# Patient Record
Sex: MALE | Race: ASIAN | NOT HISPANIC OR LATINO | Employment: OTHER | URBAN - METROPOLITAN AREA
[De-identification: names, ages, dates, MRNs, and addresses within clinical notes are randomized per-mention and may not be internally consistent; named-entity substitution may affect disease eponyms.]

---

## 2021-04-28 ENCOUNTER — COMMUNICATION - HEALTHEAST (OUTPATIENT)
Dept: ADMINISTRATIVE | Facility: CLINIC | Age: 76
End: 2021-04-28

## 2021-04-29 ENCOUNTER — COMMUNICATION - HEALTHEAST (OUTPATIENT)
Dept: CARDIOLOGY | Facility: CLINIC | Age: 76
End: 2021-04-29

## 2021-05-31 ENCOUNTER — RECORDS - HEALTHEAST (OUTPATIENT)
Dept: ADMINISTRATIVE | Facility: CLINIC | Age: 76
End: 2021-05-31

## 2021-06-09 ENCOUNTER — RECORDS - HEALTHEAST (OUTPATIENT)
Dept: ADMINISTRATIVE | Facility: CLINIC | Age: 76
End: 2021-06-09

## 2021-06-16 PROBLEM — I25.119 CORONARY ARTERY DISEASE INVOLVING NATIVE CORONARY ARTERY OF NATIVE HEART WITH ANGINA PECTORIS (H): Status: ACTIVE | Noted: 2020-01-09

## 2021-06-16 PROBLEM — E78.2 MIXED HYPERLIPIDEMIA: Status: ACTIVE | Noted: 2020-01-09

## 2021-06-16 PROBLEM — E11.9 TYPE 2 DIABETES MELLITUS WITHOUT COMPLICATION, WITHOUT LONG-TERM CURRENT USE OF INSULIN (H): Status: ACTIVE | Noted: 2020-01-09

## 2021-06-21 NOTE — LETTER
Letter by Baldo Stahl MD at      Author: Baldo Stahl MD Service: -- Author Type: --    Filed:  Encounter Date: 4/29/2021 Status: (Other)         Kamille Gleason  [address] [facility] [address]      April 29, 2021      Dear Kamille,    This letter is to remind you that you are due for your follow up appointment with Dr. Baldo Stahl . To help ensure you are in the best health possible, a regular follow-up with your cardiologist is essential.     Please call our Patient Scheduling Line at [phone] to schedule your appointment at your earliest convenience.  If you have recently scheduled an appointment, please disregard this letter.    We look forward to seeing you again. As always, we are available at the number  above for any questions or concerns you may have.      Sincerely,     The Physicians and Staff of [facility]

## 2021-06-21 NOTE — LETTER
Letter by Baldo Stahl MD at      Author: Baldo Stahl MD Service: -- Author Type: --    Filed:  Encounter Date: 4/28/2021 Status: (Other)         Kamille Gleason  [address] [facility] [address]      April 28, 2021      Dear Kamille,    This letter is to remind you that you are due for your follow up appointment with Dr. Baldo Stahl  . To help ensure you are in the best health possible, a regular follow-up with your cardiologist is essential.     Please call our Patient Scheduling Line at [phone] to schedule your appointment at your earliest convenience.  If you have recently scheduled an appointment, please disregard this letter.    We look forward to seeing you again. As always, we are available at the number  above for any questions or concerns you may have.      Sincerely,     The Physicians and Staff of [facility]

## 2021-07-12 ENCOUNTER — HOSPITAL ENCOUNTER (EMERGENCY)
Dept: RADIOLOGY | Facility: HOSPITAL | Age: 76
End: 2021-07-12
Attending: STUDENT IN AN ORGANIZED HEALTH CARE EDUCATION/TRAINING PROGRAM
Payer: COMMERCIAL

## 2021-07-12 ENCOUNTER — HOSPITAL ENCOUNTER (EMERGENCY)
Facility: HOSPITAL | Age: 76
Discharge: HOME | End: 2021-07-12
Attending: STUDENT IN AN ORGANIZED HEALTH CARE EDUCATION/TRAINING PROGRAM | Admitting: STUDENT IN AN ORGANIZED HEALTH CARE EDUCATION/TRAINING PROGRAM
Payer: COMMERCIAL

## 2021-07-12 ENCOUNTER — HOSPITAL ENCOUNTER (EMERGENCY)
Dept: MRI IMAGING | Facility: HOSPITAL | Age: 76
End: 2021-07-12
Attending: STUDENT IN AN ORGANIZED HEALTH CARE EDUCATION/TRAINING PROGRAM
Payer: COMMERCIAL

## 2021-07-12 VITALS
SYSTOLIC BLOOD PRESSURE: 138 MMHG | WEIGHT: 178 LBS | OXYGEN SATURATION: 99 % | HEART RATE: 59 BPM | HEIGHT: 67 IN | RESPIRATION RATE: 26 BRPM | DIASTOLIC BLOOD PRESSURE: 70 MMHG | BODY MASS INDEX: 27.94 KG/M2 | TEMPERATURE: 98 F

## 2021-07-12 DIAGNOSIS — R20.2 PARESTHESIAS: ICD-10-CM

## 2021-07-12 DIAGNOSIS — R61 DIAPHORESIS: ICD-10-CM

## 2021-07-12 LAB
ALBUMIN SERPL-MCNC: 3.8 G/DL (ref 3.5–5)
ALP SERPL-CCNC: 101 U/L (ref 45–120)
ALT SERPL W P-5'-P-CCNC: 22 U/L (ref 0–45)
ANION GAP SERPL CALCULATED.3IONS-SCNC: 7 MMOL/L (ref 5–18)
AST SERPL W P-5'-P-CCNC: 21 U/L (ref 0–40)
BASOPHILS # BLD AUTO: 0 10E3/UL (ref 0–0.2)
BASOPHILS NFR BLD AUTO: 0 %
BILIRUB SERPL-MCNC: 0.4 MG/DL (ref 0–1)
BUN SERPL-MCNC: 20 MG/DL (ref 8–28)
CALCIUM SERPL-MCNC: 8.9 MG/DL (ref 8.5–10.5)
CHLORIDE BLD-SCNC: 107 MMOL/L (ref 98–107)
CO2 SERPL-SCNC: 25 MMOL/L (ref 22–31)
CREAT SERPL-MCNC: 0.86 MG/DL (ref 0.7–1.3)
EOSINOPHIL # BLD AUTO: 0.2 10E3/UL (ref 0–0.7)
EOSINOPHIL NFR BLD AUTO: 3 %
ERYTHROCYTE [DISTWIDTH] IN BLOOD BY AUTOMATED COUNT: 12.7 % (ref 10–15)
GFR SERPL CREATININE-BSD FRML MDRD: 85 ML/MIN/1.73M2
GLUCOSE BLD-MCNC: 113 MG/DL (ref 70–125)
HCT VFR BLD AUTO: 39.6 % (ref 40–53)
HGB BLD-MCNC: 12.7 G/DL (ref 13.3–17.7)
IMM GRANULOCYTES # BLD: 0 10E3/UL
IMM GRANULOCYTES NFR BLD: 0 %
INR PPP: 1.03 (ref 0.85–1.15)
LYMPHOCYTES # BLD AUTO: 1.7 10E3/UL (ref 0.8–5.3)
LYMPHOCYTES NFR BLD AUTO: 26 %
MCH RBC QN AUTO: 29.8 PG (ref 26.5–33)
MCHC RBC AUTO-ENTMCNC: 32.1 G/DL (ref 31.5–36.5)
MCV RBC AUTO: 93 FL (ref 78–100)
MONOCYTES # BLD AUTO: 0.5 10E3/UL (ref 0–1.3)
MONOCYTES NFR BLD AUTO: 7 %
NEUTROPHILS # BLD AUTO: 4.3 10E3/UL (ref 1.6–8.3)
NEUTROPHILS NFR BLD AUTO: 64 %
NRBC # BLD AUTO: 0 10E3/UL
NRBC BLD AUTO-RTO: 0 /100
PLATELET # BLD AUTO: 179 10E3/UL (ref 150–450)
POTASSIUM BLD-SCNC: 4.7 MMOL/L (ref 3.5–5)
PROT SERPL-MCNC: 6.7 G/DL (ref 6–8)
RBC # BLD AUTO: 4.26 10E6/UL (ref 4.4–5.9)
SODIUM SERPL-SCNC: 139 MMOL/L (ref 136–145)
TROPONIN I SERPL-MCNC: <0.01 NG/ML (ref 0–0.29)
TROPONIN I SERPL-MCNC: <0.01 NG/ML (ref 0–0.29)
WBC # BLD AUTO: 6.7 10E3/UL (ref 4–11)

## 2021-07-12 PROCEDURE — 99285 EMERGENCY DEPT VISIT HI MDM: CPT | Mod: 25

## 2021-07-12 PROCEDURE — 71046 X-RAY EXAM CHEST 2 VIEWS: CPT

## 2021-07-12 PROCEDURE — 36592 COLLECT BLOOD FROM PICC: CPT | Performed by: FAMILY MEDICINE

## 2021-07-12 PROCEDURE — 36592 COLLECT BLOOD FROM PICC: CPT | Performed by: STUDENT IN AN ORGANIZED HEALTH CARE EDUCATION/TRAINING PROGRAM

## 2021-07-12 PROCEDURE — 36415 COLL VENOUS BLD VENIPUNCTURE: CPT | Performed by: STUDENT IN AN ORGANIZED HEALTH CARE EDUCATION/TRAINING PROGRAM

## 2021-07-12 PROCEDURE — 85610 PROTHROMBIN TIME: CPT | Performed by: STUDENT IN AN ORGANIZED HEALTH CARE EDUCATION/TRAINING PROGRAM

## 2021-07-12 PROCEDURE — 70549 MR ANGIOGRAPH NECK W/O&W/DYE: CPT

## 2021-07-12 PROCEDURE — 85025 COMPLETE CBC W/AUTO DIFF WBC: CPT | Performed by: STUDENT IN AN ORGANIZED HEALTH CARE EDUCATION/TRAINING PROGRAM

## 2021-07-12 PROCEDURE — 93005 ELECTROCARDIOGRAM TRACING: CPT

## 2021-07-12 PROCEDURE — 84484 ASSAY OF TROPONIN QUANT: CPT | Performed by: FAMILY MEDICINE

## 2021-07-12 PROCEDURE — 70553 MRI BRAIN STEM W/O & W/DYE: CPT

## 2021-07-12 PROCEDURE — 36415 COLL VENOUS BLD VENIPUNCTURE: CPT | Mod: 91 | Performed by: FAMILY MEDICINE

## 2021-07-12 PROCEDURE — 93005 ELECTROCARDIOGRAM TRACING: CPT | Performed by: FAMILY MEDICINE

## 2021-07-12 PROCEDURE — A9585 GADOBUTROL INJECTION: HCPCS | Performed by: STUDENT IN AN ORGANIZED HEALTH CARE EDUCATION/TRAINING PROGRAM

## 2021-07-12 PROCEDURE — 255N000002 HC RX 255 OP 636: Performed by: STUDENT IN AN ORGANIZED HEALTH CARE EDUCATION/TRAINING PROGRAM

## 2021-07-12 PROCEDURE — 82040 ASSAY OF SERUM ALBUMIN: CPT | Performed by: STUDENT IN AN ORGANIZED HEALTH CARE EDUCATION/TRAINING PROGRAM

## 2021-07-12 PROCEDURE — 84484 ASSAY OF TROPONIN QUANT: CPT | Performed by: STUDENT IN AN ORGANIZED HEALTH CARE EDUCATION/TRAINING PROGRAM

## 2021-07-12 PROCEDURE — 93005 ELECTROCARDIOGRAM TRACING: CPT | Performed by: STUDENT IN AN ORGANIZED HEALTH CARE EDUCATION/TRAINING PROGRAM

## 2021-07-12 RX ORDER — GADOBUTROL 604.72 MG/ML
8 INJECTION INTRAVENOUS ONCE
Status: COMPLETED | OUTPATIENT
Start: 2021-07-12 | End: 2021-07-12

## 2021-07-12 RX ORDER — ASPIRIN 325 MG
325 TABLET ORAL DAILY
Refills: 0
Start: 2021-07-12 | End: 2024-09-04

## 2021-07-12 RX ADMIN — GADOBUTROL 8 ML: 604.72 INJECTION INTRAVENOUS at 17:09

## 2021-07-12 ASSESSMENT — ENCOUNTER SYMPTOMS
SHORTNESS OF BREATH: 0
NUMBNESS: 1
NECK PAIN: 1
SPEECH DIFFICULTY: 0
WEAKNESS: 0
NECK STIFFNESS: 1
DIAPHORESIS: 1

## 2021-07-12 ASSESSMENT — MIFFLIN-ST. JEOR: SCORE: 1506.03

## 2021-07-12 NOTE — ED NOTES
Bed: JNED-01  Expected date: 7/12/21  Expected time: 3:14 PM  Means of arrival: Ambulance  Comments:  Kelsy- left side tingling

## 2021-07-12 NOTE — ED TRIAGE NOTES
Arrives via ems after episode of lt sided tingling and diaphoresis. Was concerned this may be his heart as he had same symptoms prior to heart surgery. Took 650mg of ASA and 1 NTG SL. Most of tingling resolved PTA. Still c/o tingling lt foot. Denies any CP. Wife noticed he had an episode of abnormal speech yesterday that resolved on it's own. Stroke scale negative on arrival.

## 2021-07-12 NOTE — ED PROVIDER NOTES
EMERGENCY DEPARTMENT ENCOUNTER      NAME: Kamille Gleason  AGE: 75 year old male  YOB: 1945  MRN: [id number]  EVALUATION DATE & TIME: 7/12/2021  3:27 PM    PCP: No primary care provider on file.    ED PROVIDER: Azael Edwards M.D.      Chief Complaint   Patient presents with     Numbness     Lt foot         FINAL IMPRESSION:  No diagnosis found.      ED COURSE & MEDICAL DECISION MAKING:    Pertinent Labs & Imaging studies reviewed. (See chart for details)  75 year old male presents to the Emergency Department for evaluation of very vague symptomatology including numbness which she locates in his left ankle shoulder elbow and a spot on his face.  History was a bit difficult and symptoms were vague.  The patient himself is concerned that these could be similar to his previous MI symptoms although that was almost 15 years ago.  Patient did not have any significant headache and no other neuro symptoms associated.  Because of his heart history I felt that at the very least a troponin and probably delta troponin were warranted.  MRI has been pursued to rule out any sort of stroke or intracranial process.  Patient did have some neck pain which based on history and physical sound to be musculoskeletal however certainly dissection or spinal cord or disc issue is certainly in the differential this should be ruled out by MRI.    Patient did have one episode here in the emergency department where his entire lower face became numb.  EKG remains reassuring and I think this is less likely anything intracranial or cardiac in nature however we will continue with work-up.    Patient was signed out to Dr. Velasquez pending MRI and second troponin.    3:29 PM I met with the patient, obtained history, performed an initial exam, and discussed options and plan for diagnostics and treatment here in the ED. Proper PPE was worn during the entire patient encounter.   3:51 PM I rechecked and updated the patient.  He did say  that he had some numbness around his entire face which seem to have just started.  Neuro exam remained completely normal.    National Institutes of Health Stroke Scale  Exam Interval: Baseline   Score    Level of consciousness: (0)   Alert, keenly responsive    LOC questions: (0)   Answers both questions correctly    LOC commands: (0)   Performs both tasks correctly    Best gaze: (0)   Normal    Visual: (0)   No visual loss    Facial palsy: (0)   Normal symmetrical movements    Motor arm (left): (0)   No drift    Motor arm (right): (0)   No drift    Motor leg (left): (0)   No drift    Motor leg (right): (0)   No drift    Limb ataxia: (0)   Absent    Sensory: (0)   Normal- no sensory loss    Best language: (0)   Normal- no aphasia    Dysarthria: (0)   Normal    Extinction and inattention: (0)   No abnormality        Total Score:  0           At the conclusion of the encounter I discussed the results of all of the tests and the disposition. The questions were answered. The patient or family acknowledged understanding and was agreeable with the care plan.        minutes of critical care time     MEDICATIONS GIVEN IN THE EMERGENCY:  Medications - No data to display    NEW PRESCRIPTIONS STARTED AT TODAY'S ER VISIT\  New Prescriptions    No medications on file           =================================================================    HPI    Patient information was obtained from: Patient     Use of : N/A         Kamille Gleason is a 75 year old male with a pertinent history of s/p CABG x3, diabetes mellitus II, hyperlipidemia,  who presents to this ED via EMS for evaluation of numbness.    Today at 1330 (~2 hours PTA), patient began to endorse numbness and tingling to his left ankle, knee, shoulder, and face. He also became diaphoretic so he took 650 mg of aspirin, and 1 nitroglycerin which seemed to resolve most of his symptoms. Patient's symptoms have mostly resolved but he still endorses paresthesia to  his left leg radiating up to his knee. He also reports having several days of neck pain and stiffness that seemed to worsen just prior to this episode of numbness today. His wife also noticed his speech seemed somewhat slurred yesterday, which didn't last long and she feels like he hasn't been acting like himself for the past few days. He also endorsed some chest pain and pressure earlier this week but denies having any chest pain today. Patient states his symptoms felt somewhat similar to his previous heart surgery.     Denies weakness, speech difficulty, chest pain, shortness of breath, or any additional symptoms at this time.       REVIEW OF SYSTEMS   Review of Systems   Constitutional: Positive for diaphoresis.   Respiratory: Negative for shortness of breath.    Cardiovascular: Negative for chest pain.   Musculoskeletal: Positive for neck pain and neck stiffness.   Neurological: Positive for numbness (left side). Negative for speech difficulty and weakness.   All other systems reviewed and are negative.       PAST MEDICAL HISTORY:  Past Medical History:   Diagnosis Date     CAD (coronary artery disease)      Hyperlipidemia        PAST SURGICAL HISTORY:  Past Surgical History:   Procedure Laterality Date     BACK SURGERY       BYPASS GRAFT ARTERY CORONARY  2007    3-vessel     KNEE SURGERY Left            CURRENT MEDICATIONS:    No current facility-administered medications for this encounter.     Current Outpatient Medications   Medication     aspirin 325 MG tablet     cholecalciferol, vitamin D3, 2,000 unit Tab     nitroglycerin (NITROSTAT) 0.4 MG SL tablet     omeprazole (PRILOSEC) 20 MG capsule     psyllium (METAMUCIL) 3.4 gram packet     simvastatin (ZOCOR) 20 MG tablet     traMADol (ULTRAM) 50 mg tablet       ALLERGIES:  Allergies   Allergen Reactions     Atorvastatin Headache       FAMILY HISTORY:  Family History   Problem Relation Age of Onset     Coronary Artery Disease Brother        SOCIAL HISTORY:  "  Social History     Socioeconomic History     Marital status:      Spouse name: Not on file     Number of children: Not on file     Years of education: Not on file     Highest education level: Not on file   Occupational History     Not on file   Tobacco Use     Smoking status: Former Smoker     Tobacco comment: pt quit 5.5 years ago   Substance and Sexual Activity     Alcohol use: Yes     Comment: Alcoholic Drinks/day: 3 times per week     Drug use: No     Sexual activity: Not on file   Other Topics Concern     Not on file   Social History Narrative     Not on file     Social Determinants of Health     Financial Resource Strain:      Difficulty of Paying Living Expenses:    Food Insecurity:      Worried About Running Out of Food in the Last Year:      Ran Out of Food in the Last Year:    Transportation Needs:      Lack of Transportation (Medical):      Lack of Transportation (Non-Medical):    Physical Activity:      Days of Exercise per Week:      Minutes of Exercise per Session:    Stress:      Feeling of Stress :    Social Connections:      Frequency of Communication with Friends and Family:      Frequency of Social Gatherings with Friends and Family:      Attends Christian Services:      Active Member of Clubs or Organizations:      Attends Club or Organization Meetings:      Marital Status:    Intimate Partner Violence:      Fear of Current or Ex-Partner:      Emotionally Abused:      Physically Abused:      Sexually Abused:        VITALS:  BP 95/57   Pulse 62   Temp 98  F (36.7  C) (Oral)   Resp 15   Ht 1.702 m (5' 7\")   Wt 80.7 kg (178 lb)   SpO2 96%   BMI 27.88 kg/m      PHYSICAL EXAM    Constitutional: Well developed, Well nourished, NAD, GCS 15  HENT: Normocephalic, Atraumatic, Bilateral external ears normal, Oropharynx normal, mucous membranes moist, Nose normal. Neck-  Normal range of motion, No tenderness, Supple, No stridor.  Eyes: PERRL, EOMI, Conjunctiva normal, No discharge. "   Respiratory: Normal breath sounds, No respiratory distress, No wheezing, Speaks full sentences easily. No cough.  Cardiovascular: Normal heart rate, Regular rhythm, No murmurs, No rubs, No gallops. Chest wall nontender.  GI:Soft, No tenderness, No masses, No flank tenderness. No rebound or guarding.    Musculoskeletal: 2+ DP pulses. No edema.No cyanosis, No clubbing. Good range of motion in all major joints. No tenderness to palpation or major deformities noted.   Integument: Warm, Dry, No erythema, No rash. No petechiae.   Neurologic: Alert & oriented x 3,  CN 3-12 intact Normal motor function, Normal sensory function, No focal deficits noted. Normal gait. Normal finger to nose bilaterally. No subjective sensation loss to his extremities or face.   Psychiatric: Affect normal, Judgment normal, Mood normal. Cooperative.        LAB:  All pertinent labs reviewed and interpreted.  Labs Ordered and Resulted from Time of ED Arrival Up to the Time of Departure from the ED   CBC WITH PLATELETS AND DIFFERENTIAL - Abnormal; Notable for the following components:       Result Value    RBC Count 4.26 (*)     Hemoglobin 12.7 (*)     Hematocrit 39.6 (*)     All other components within normal limits   COMPREHENSIVE METABOLIC PANEL - Normal   TROPONIN I - Normal   INR - Normal   CBC WITH PLATELETS & DIFFERENTIAL    Narrative:     The following orders were created for panel order CBC with Platelets & Differential.  Procedure                               Abnormality         Status                     ---------                               -----------         ------                     CBC with platelets and d...[[id number]]  Abnormal            Final result                 Please view results for these tests on the individual orders.         RADIOLOGY:  Reviewed all pertinent imaging. Please see official radiology report.  MR Brain COW Carotid wwo Contrast    (Results Pending)   Chest XR,  PA & LAT    (Results Pending)         EKG:     Performed at: 1533    Impression: Sinus rhythm. Nonspecific T wave abnormality anteriorly.     Rate: 65 bpm  Rhythm: Normal sinus rhythm  Axis: 51 ms  ND Interval: 198 ms  QRS Interval: 94 ms  QTc Interval: 424/440 ms  ST Changes: Nonspecific T wave abnormality anteriorly.   Comparison: When compared with EKG os 02-NOV-2020, no significant change was found    I have independently reviewed and interpreted the EKG(s) documented above.    EKG:    Performed at: 1554    Impression: Sinus rhythm. Nonspecific T wave abnormality anteriorly.     Rate: 63 bpm  Rhythm: Normal sinus rhythm   Axis: 53  ND Interval: 186 ms  QRS Interval: 94 ms  QTc Interval: 426/435 ms  ST Changes: Nonspecific T wave abnormality anteriorly.   Comparison: When compared with prior EKG, no significant change was found.     I have independently reviewed and interpreted the EKG(s) documented    PROCEDURES:   None      I, Miladys Rojas, am serving as a scribe to document services personally performed by Dr. Azael Edwards based on my observation and the provider's statements to me. I, Azael Edwards MD attest that Miladys Rojas is acting in a scribe capacity, has observed my performance of the services and has documented them in accordance with my direction.    Azael Edwards M.D.  Emergency Medicine  [facility] EMERGENCY DEPARTMENT  [address] [id number] [phone]  Dept: [phone]     Azael Edwards MD  07/12/21 9171

## 2021-07-12 NOTE — ED PROVIDER NOTES
"ED SIGNOUT  Date/Time:7/12/2021 5:33 PM    Patient signed out to me by my colleague, Azael Edwards MD.  Please see their note for complete history and physical. Plan to follow up on pending MRI and second troponin.     The creation of this record is based on the scribe s observations of the work being performed by Otto Velasquez MD, and the provider s statements to them. It was created on their behalf by Giovany Galdamez a trained medical scribe. This document has been checked and approved by the attending provider.      ED Course/MDM:  5:30 PM Signout accepted from Azael Edwards MD.  Prior records were reviewed.  Diagnostics from this visit are reviewed.  Patient presents with migratory paresthesias, including left arm paresthesia earlier accompanied by diaphoresis.  Concerned because he feels like this is similar to symptoms prior to his bypass.  EKG reassuring, initial troponin negative.  MRI is pending along with repeat troponin at 7 PM and plan to discharge if negative with outpatient follow-up.    ED Course as of Jul 12 1838   Mon Jul 12, 2021   1749 5:50 PM MRI negative for acute intracranial pathology.  Repeat troponin at 7:00 PM.  If negative, patient is stable for outpatient follow-up with PCP and cardiology.        Patient with migratory paresthesias, history of CABG.  No neurologic findings on exam, MRI/MRI reassuring, EKG reassuring and troponin negative x2.  Patient should increase aspirin to 325 mg daily and follow-up with neurology    Vitals:  /65   Pulse 61   Temp 98  F (36.7  C) (Oral)   Resp 22   Ht 1.702 m (5' 7\")   Wt 80.7 kg (178 lb)   SpO2 98%   BMI 27.88 kg/m        EKG   EKG personally reviewed and interpreted by me performed at 7:30 PM demonstrates normal sinus rhythm with first-degree AV block rate 62, no acute ST elevations or depression, normal intervals, normal axis.  Compared to prior earlier today, previously seen PACs are absent,.    Pertinent labs results reviewed "   Results for orders placed or performed during the hospital encounter of 07/12/21   MR Brain COW Carotid wwo Contrast    Impression    IMPRESSION:  HEAD MRI:   1.  No acute intracranial abnormality.    2.  Mild volume loss.    3.  Paranasal sinus mucosal disease.    HEAD MRA:   1.  Limited by motion. No definite high grade stenosis. No occlusion within the proximal branches of the [place].    2.  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    NECK MRA:  1.  No significant carotid stenosis.    2.  Origins of the vertebral arteries are tortuous and poorly evaluated due to motion. There is likely mild narrowing of the left vertebral artery origin, though the vessels are otherwise widely patent throughout their cervical courses.    To consult with one of our [facility] Neurointerventional experts or schedule a formal appointment, please contact our clinic, call center, or on-call physician.    [facility] Neurointerventional Clinic: [phone]  [facility] Call Center: [phone]  On-call MD: [phone] (pager)   Chest XR,  PA & LAT    Impression    IMPRESSION: No pleural fluid or pneumothorax. No airspace disease or edema. Normal size of the heart. Sternotomy suture wires are present.    Comprehensive metabolic panel   Result Value Ref Range    Sodium 139 136 - 145 mmol/L    Potassium 4.7 3.5 - 5.0 mmol/L    Chloride 107 98 - 107 mmol/L    Carbon Dioxide (CO2) 25 22 - 31 mmol/L    Anion Gap 7 5 - 18 mmol/L    Urea Nitrogen 20 8 - 28 mg/dL    Creatinine 0.86 0.70 - 1.30 mg/dL    Calcium 8.9 8.5 - 10.5 mg/dL    Glucose 113 70 - 125 mg/dL    Alkaline Phosphatase 101 45 - 120 U/L    AST 21 0 - 40 U/L    ALT 22 0 - 45 U/L    Protein Total 6.7 6.0 - 8.0 g/dL    Albumin 3.8 3.5 - 5.0 g/dL    Bilirubin Total 0.4 0.0 - 1.0 mg/dL    GFR Estimate 85 >60 mL/min/1.73m2   Troponin I (now)   Result Value Ref Range    Troponin I <0.01 0.00 - 0.29 ng/mL   INR   Result Value Ref Range    INR 1.03 0.85 - 1.15    CBC with platelets and differential   Result Value Ref Range    WBC Count 6.7 4.0 - 11.0 10e3/uL    RBC Count 4.26 (L) 4.40 - 5.90 10e6/uL    Hemoglobin 12.7 (L) 13.3 - 17.7 g/dL    Hematocrit 39.6 (L) 40.0 - 53.0 %    MCV 93 78 - 100 fL    MCH 29.8 26.5 - 33.0 pg    MCHC 32.1 31.5 - 36.5 g/dL    RDW 12.7 10.0 - 15.0 %    Platelet Count 179 150 - 450 10e3/uL    % Neutrophils 64 %    % Lymphocytes 26 %    % Monocytes 7 %    % Eosinophils 3 %    % Basophils 0 %    % Immature Granulocytes 0 %    NRBCs per 100 WBC 0 <1 /100    Absolute Neutrophils 4.3 1.6 - 8.3 10e3/uL    Absolute Lymphocytes 1.7 0.8 - 5.3 10e3/uL    Absolute Monocytes 0.5 0.0 - 1.3 10e3/uL    Absolute Eosinophils 0.2 0.0 - 0.7 10e3/uL    Absolute Basophils 0.0 0.0 - 0.2 10e3/uL    Absolute Immature Granulocytes 0.0 <=0.0 10e3/uL    Absolute NRBCs 0.0 10e3/uL   Troponin I (second draw)   Result Value Ref Range    Troponin I <0.01 0.00 - 0.29 ng/mL       Pertinent imaging reviewed   Please see official radiology report.  MR Brain COW Carotid wwo Contrast   Final Result   IMPRESSION:   HEAD MRI:    1.  No acute intracranial abnormality.      2.  Mild volume loss.      3.  Paranasal sinus mucosal disease.      HEAD MRA:    1.  Limited by motion. No definite high grade stenosis. No occlusion within the proximal branches of the [place].      2.  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      NECK MRA:   1.  No significant carotid stenosis.      2.  Origins of the vertebral arteries are tortuous and poorly evaluated due to motion. There is likely mild narrowing of the left vertebral artery origin, though the vessels are otherwise widely patent throughout their cervical courses.      To consult with one of our [facility] Neurointerventional experts or schedule a formal appointment, please contact our clinic, call center, or on-call physician.      [facility] Neurointerventional Clinic: [phone]   [facility] Call Center: [phone]   On-call MD: [phone] (pager)      Chest XR,  PA & LAT   Final Result   IMPRESSION: No pleural fluid or pneumothorax. No airspace disease or edema. Normal size of the heart. Sternotomy suture wires are present.          Interventions  Medications - No data to display     1. Paresthesias    2. Diaphoresis      Otto Velasquez MD,  [facility] Emergency Department     Otto Velasquez MD  07/12/21 2042       Otto Velasquez MD  07/12/21 2043

## 2021-07-13 LAB
ATRIAL RATE - MUSE: 62 BPM
ATRIAL RATE - MUSE: 65 BPM
DIASTOLIC BLOOD PRESSURE - MUSE: 61 MMHG
DIASTOLIC BLOOD PRESSURE - MUSE: 72 MMHG
INTERPRETATION ECG - MUSE: NORMAL
INTERPRETATION ECG - MUSE: NORMAL
P AXIS - MUSE: 15 DEGREES
P AXIS - MUSE: 9 DEGREES
PR INTERVAL - MUSE: 198 MS
PR INTERVAL - MUSE: 212 MS
QRS DURATION - MUSE: 94 MS
QRS DURATION - MUSE: 98 MS
QT - MUSE: 424 MS
QT - MUSE: 440 MS
QTC - MUSE: 440 MS
QTC - MUSE: 446 MS
R AXIS - MUSE: 51 DEGREES
R AXIS - MUSE: 55 DEGREES
SYSTOLIC BLOOD PRESSURE - MUSE: 115 MMHG
SYSTOLIC BLOOD PRESSURE - MUSE: 121 MMHG
T AXIS - MUSE: 80 DEGREES
T AXIS - MUSE: 86 DEGREES
VENTRICULAR RATE- MUSE: 62 BPM
VENTRICULAR RATE- MUSE: 65 BPM

## 2021-07-13 NOTE — ED NOTES
Rounded on patient. Reviewed CP. Repeat troponin and EKG done. Denies CP or SOB. Reports some tingling in his bottom lip that has improved since he got here.

## 2021-07-20 ENCOUNTER — OFFICE VISIT (OUTPATIENT)
Dept: NEUROLOGY | Facility: CLINIC | Age: 76
End: 2021-07-20
Payer: COMMERCIAL

## 2021-07-20 ENCOUNTER — LAB (OUTPATIENT)
Dept: LAB | Facility: HOSPITAL | Age: 76
End: 2021-07-20
Payer: COMMERCIAL

## 2021-07-20 VITALS
WEIGHT: 177 LBS | HEIGHT: 68 IN | BODY MASS INDEX: 26.83 KG/M2 | SYSTOLIC BLOOD PRESSURE: 124 MMHG | HEART RATE: 65 BPM | DIASTOLIC BLOOD PRESSURE: 91 MMHG

## 2021-07-20 DIAGNOSIS — R20.2 PARESTHESIAS: ICD-10-CM

## 2021-07-20 DIAGNOSIS — M54.2 NECK PAIN: Primary | ICD-10-CM

## 2021-07-20 LAB
TSH SERPL DL<=0.005 MIU/L-ACNC: 1.35 UIU/ML (ref 0.3–5)
VIT B12 SERPL-MCNC: 454 PG/ML (ref 213–816)

## 2021-07-20 PROCEDURE — 82607 VITAMIN B-12: CPT

## 2021-07-20 PROCEDURE — 36415 COLL VENOUS BLD VENIPUNCTURE: CPT

## 2021-07-20 PROCEDURE — 84443 ASSAY THYROID STIM HORMONE: CPT

## 2021-07-20 PROCEDURE — 99204 OFFICE O/P NEW MOD 45 MIN: CPT | Performed by: PSYCHIATRY & NEUROLOGY

## 2021-07-20 ASSESSMENT — MIFFLIN-ST. JEOR: SCORE: 1509.43

## 2021-07-20 NOTE — NURSING NOTE
Chief Complaint   Patient presents with     Numbness     Episode of face numbness to left foot.     Donna Perales RN on 7/20/2021 at 8:50 AM

## 2021-07-20 NOTE — LETTER
7/20/2021         RE: Kamille Gleason  [address] [facility] [address]        Dear Colleague,    Thank you for referring your patient, Kamille Gleason, to the [facility] [place]. Please see a copy of my visit note below.    [facility]  [place]    Kamille Gleason MRN# [id number]   Age: 75 year old YOB: 1945               Assessment and Plan:   Assessment:   Paresthesias, neck pain    He had brief paresthesias in the face, some pain and tingling to the left ankle, etiology not clear    We will check MRI of the C-spine, if this shows facet arthropathy this could be the cause of his neck pain.  If so we could send him for facet injection, if not physical therapy may help.  The MRI will also tell us if there is any cord compression or a spinal cord lesion that might contribute to his symptoms.        Plan:   Orders Placed This Encounter   Procedures     MR Cervical Spine w/o Contrast     Vitamin B12     TSH                  Chief Complaint/HPI:     This patient is a 75-year-old man seen in neurologic consultation at our [facility].  He was seen in the [facility] emergency room last week, he woke up from a nap and felt numbness on both sides of his face.  He thought this might be a stroke, his wife prevailed on him to call the ambulance.  At [facility] MRI of the brain showed no stroke or other acute issues.  His heart was checked out.  MRA showed no significant stenosis.  Basic labs were fine.  He also mentions a tingling pain at the left ankle, this is not worse with weightbearing.  He says that ice and hot packs make it better, I asked about that and this is in relation to some neck pain that he has also been experiencing for a couple months now.  He has to turn his shoulders in order to look to the side (such as with driving).  Sometimes his feet will feel cold at night.            Past Medical History:    has a past medical history of CAD  "(coronary artery disease) and Hyperlipidemia.          Past Surgical History:    has a past surgical history that includes Bypass graft artery coronary (2007); back surgery; and knee surgery (Left).          Social History:     Social History     Tobacco Use     Smoking status: Former Smoker     Smokeless tobacco: Never Used     Tobacco comment: pt quit 10 years ago   Substance Use Topics     Alcohol use: Yes     Comment: 2 times per week most             Family History:     Family History   Problem Relation Age of Onset     Coronary Artery Disease Brother                 Allergies:     Allergies   Allergen Reactions     Atorvastatin Headache             Medications:     Current Outpatient Medications:      aspirin (ASA) 325 MG tablet, Take 1 tablet (325 mg) by mouth daily, Disp: , Rfl: 0     cholecalciferol, vitamin D3, 2,000 unit Tab, [CHOLECALCIFEROL, VITAMIN D3, 2,000 UNIT TAB] Take 2,000 Units by mouth daily., Disp: , Rfl:      nitroglycerin (NITROSTAT) 0.4 MG SL tablet, [NITROGLYCERIN (NITROSTAT) 0.4 MG SL TABLET] Place 0.4 mg under the tongue every 5 (five) minutes as needed., Disp: , Rfl:      psyllium (METAMUCIL) 3.4 gram packet, [PSYLLIUM (METAMUCIL) 3.4 GRAM PACKET] Take 1 packet by mouth daily as needed. , Disp: , Rfl:      simvastatin (ZOCOR) 20 MG tablet, [SIMVASTATIN (ZOCOR) 20 MG TABLET] Take 20 mg by mouth 3 (three) times a week. MWF at bedtime, Disp: , Rfl:      traMADol (ULTRAM) 50 mg tablet, [TRAMADOL (ULTRAM) 50 MG TABLET] Take 50 mg by mouth every 6 (six) hours as needed., Disp: , Rfl:      omeprazole (PRILOSEC) 20 MG capsule, [OMEPRAZOLE (PRILOSEC) 20 MG CAPSULE] Take 20 mg by mouth daily as needed. (Patient not taking: Reported on 7/20/2021), Disp: , Rfl:               Physical Exam:      Vitals: BP (!) 124/91 (BP Location: Right arm)   Pulse 65   Ht 1.715 m (5' 7.5\")   Wt 80.3 kg (177 lb)   BMI 27.31 kg/m    BMI= Body mass index is 27.31 kg/m .     Patient is alert and oriented x 3 in no " acute distress. Neck was supple, no carotid bruits, thyromegaly, lymphadenopathy or JVD noted.   Neurological Exam:   Mental status: Patient is alert and oriented x 3. Speech is clear and fluent      Cranial nerves:    CN II: Visual fields are full to confrontation. Fundoscopic exam is normal. PERRLA.   CN III, IV, VI: EOMI.    CN V: Facial sensation intact to pinprick in all 3 divisions bilaterally.    CN VII: Face is symmetric with normal eye closure and smile.   CN VII: Hearing is normal to rubbing fingers   CN IX, X: Palate elevates symmetrically. Phonation is normal. Gag present.   CN XI: Head turning and shoulder shrug are intact   CN XII: Tongue is midline with normal movements and no atrophy or fasciculations.   Motor: Muscle bulk and tone are normal. No pronator drift. Strength is 5/5 bilaterally. No fasciculations noted.   Reflexes: Reflexes are symmetric, diminished in the arms, difficult to elicit at the ankles. Plantar responses are flexor.   Sensory: Light touch, pinprick, and vibration sense are intact bilaterally.    Coordination: Rapid alternating movements and fine finger movements are intact. There is no dysmetria on finger-to-nose   Gait: Posture is normal. Gait is steady with normal steps, base, arm swing, and turning. Heel and toe walking are okay          Jamal Cantu MD             Again, thank you for allowing me to participate in the care of your patient.        Sincerely,        Jamal Cantu MD

## 2021-07-20 NOTE — LETTER
July 31, 2021      Kamille Gleason  [address] [facility] [address]        Dear ,    We are writing to inform you of your test results.    The MRI of your neck shows some wear and tear and foraminal narrowing, but no problems that should cause neck stiffness.  I recommend physical therapy for your neck pain.  I put a referral into the computer, you can call [phone] for an appointment.    If you have any questions or concerns, please call the clinic at the number listed above.       Sincerely,    Jamal Cantu MD

## 2021-07-20 NOTE — PROGRESS NOTES
[facility]  [place]    Kamille Gleason MRN# [id number]   Age: 75 year old YOB: 1945               Assessment and Plan:   Assessment:   Paresthesias, neck pain    He had brief paresthesias in the face, some pain and tingling to the left ankle, etiology not clear    We will check MRI of the C-spine, if this shows facet arthropathy this could be the cause of his neck pain.  If so we could send him for facet injection, if not physical therapy may help.  The MRI will also tell us if there is any cord compression or a spinal cord lesion that might contribute to his symptoms.        Plan:   Orders Placed This Encounter   Procedures     MR Cervical Spine w/o Contrast     Vitamin B12     TSH                  Chief Complaint/HPI:     This patient is a 75-year-old man seen in neurologic consultation at our [place] clinic.  He was seen in the [facility] emergency room last week, he woke up from a nap and felt numbness on both sides of his face.  He thought this might be a stroke, his wife prevailed on him to call the ambulance.  At [facility] MRI of the brain showed no stroke or other acute issues.  His heart was checked out.  MRA showed no significant stenosis.  Basic labs were fine.  He also mentions a tingling pain at the left ankle, this is not worse with weightbearing.  He says that ice and hot packs make it better, I asked about that and this is in relation to some neck pain that he has also been experiencing for a couple months now.  He has to turn his shoulders in order to look to the side (such as with driving).  Sometimes his feet will feel cold at night.            Past Medical History:    has a past medical history of CAD (coronary artery disease) and Hyperlipidemia.          Past Surgical History:    has a past surgical history that includes Bypass graft artery coronary (2007); back surgery; and knee surgery (Left).          Social History:     Social History     Tobacco Use      "Smoking status: Former Smoker     Smokeless tobacco: Never Used     Tobacco comment: pt quit 10 years ago   Substance Use Topics     Alcohol use: Yes     Comment: 2 times per week most             Family History:     Family History   Problem Relation Age of Onset     Coronary Artery Disease Brother                 Allergies:     Allergies   Allergen Reactions     Atorvastatin Headache             Medications:     Current Outpatient Medications:      aspirin (ASA) 325 MG tablet, Take 1 tablet (325 mg) by mouth daily, Disp: , Rfl: 0     cholecalciferol, vitamin D3, 2,000 unit Tab, [CHOLECALCIFEROL, VITAMIN D3, 2,000 UNIT TAB] Take 2,000 Units by mouth daily., Disp: , Rfl:      nitroglycerin (NITROSTAT) 0.4 MG SL tablet, [NITROGLYCERIN (NITROSTAT) 0.4 MG SL TABLET] Place 0.4 mg under the tongue every 5 (five) minutes as needed., Disp: , Rfl:      psyllium (METAMUCIL) 3.4 gram packet, [PSYLLIUM (METAMUCIL) 3.4 GRAM PACKET] Take 1 packet by mouth daily as needed. , Disp: , Rfl:      simvastatin (ZOCOR) 20 MG tablet, [SIMVASTATIN (ZOCOR) 20 MG TABLET] Take 20 mg by mouth 3 (three) times a week. MWF at bedtime, Disp: , Rfl:      traMADol (ULTRAM) 50 mg tablet, [TRAMADOL (ULTRAM) 50 MG TABLET] Take 50 mg by mouth every 6 (six) hours as needed., Disp: , Rfl:      omeprazole (PRILOSEC) 20 MG capsule, [OMEPRAZOLE (PRILOSEC) 20 MG CAPSULE] Take 20 mg by mouth daily as needed. (Patient not taking: Reported on 7/20/2021), Disp: , Rfl:               Physical Exam:      Vitals: BP (!) 124/91 (BP Location: Right arm)   Pulse 65   Ht 1.715 m (5' 7.5\")   Wt 80.3 kg (177 lb)   BMI 27.31 kg/m    BMI= Body mass index is 27.31 kg/m .     Patient is alert and oriented x 3 in no acute distress. Neck was supple, no carotid bruits, thyromegaly, lymphadenopathy or JVD noted.   Neurological Exam:   Mental status: Patient is alert and oriented x 3. Speech is clear and fluent      Cranial nerves:    CN II: Visual fields are full to " confrontation. Fundoscopic exam is normal. PERRLA.   CN III, IV, VI: EOMI.    CN V: Facial sensation intact to pinprick in all 3 divisions bilaterally.    CN VII: Face is symmetric with normal eye closure and smile.   CN VII: Hearing is normal to rubbing fingers   CN IX, X: Palate elevates symmetrically. Phonation is normal. Gag present.   CN XI: Head turning and shoulder shrug are intact   CN XII: Tongue is midline with normal movements and no atrophy or fasciculations.   Motor: Muscle bulk and tone are normal. No pronator drift. Strength is 5/5 bilaterally. No fasciculations noted.   Reflexes: Reflexes are symmetric, diminished in the arms, difficult to elicit at the ankles. Plantar responses are flexor.   Sensory: Light touch, pinprick, and vibration sense are intact bilaterally.    Coordination: Rapid alternating movements and fine finger movements are intact. There is no dysmetria on finger-to-nose   Gait: Posture is normal. Gait is steady with normal steps, base, arm swing, and turning. Heel and toe walking are okay          Jamal Cantu MD

## 2021-07-23 ENCOUNTER — HOSPITAL ENCOUNTER (OUTPATIENT)
Dept: MRI IMAGING | Facility: HOSPITAL | Age: 76
Discharge: HOME | End: 2021-07-23
Attending: PSYCHIATRY & NEUROLOGY | Admitting: PSYCHIATRY & NEUROLOGY
Payer: COMMERCIAL

## 2021-07-23 DIAGNOSIS — R20.2 PARESTHESIAS: ICD-10-CM

## 2021-07-23 PROCEDURE — 72141 MRI NECK SPINE W/O DYE: CPT

## 2021-08-14 ENCOUNTER — HEALTH MAINTENANCE LETTER (OUTPATIENT)
Age: 76
End: 2021-08-14

## 2021-10-09 ENCOUNTER — HEALTH MAINTENANCE LETTER (OUTPATIENT)
Age: 76
End: 2021-10-09

## 2021-12-04 ENCOUNTER — HEALTH MAINTENANCE LETTER (OUTPATIENT)
Age: 76
End: 2021-12-04

## 2022-03-20 ENCOUNTER — HEALTH MAINTENANCE LETTER (OUTPATIENT)
Age: 77
End: 2022-03-20

## 2022-07-10 ENCOUNTER — HEALTH MAINTENANCE LETTER (OUTPATIENT)
Age: 77
End: 2022-07-10

## 2022-09-11 ENCOUNTER — HEALTH MAINTENANCE LETTER (OUTPATIENT)
Age: 77
End: 2022-09-11

## 2023-01-23 ENCOUNTER — HEALTH MAINTENANCE LETTER (OUTPATIENT)
Age: 78
End: 2023-01-23

## 2023-04-09 ENCOUNTER — HOSPITAL ENCOUNTER (EMERGENCY)
Facility: HOSPITAL | Age: 78
Discharge: HOME | End: 2023-04-09
Attending: EMERGENCY MEDICINE | Admitting: EMERGENCY MEDICINE
Payer: COMMERCIAL

## 2023-04-09 ENCOUNTER — APPOINTMENT (OUTPATIENT)
Dept: MRI IMAGING | Facility: HOSPITAL | Age: 78
End: 2023-04-09
Attending: EMERGENCY MEDICINE
Payer: COMMERCIAL

## 2023-04-09 ENCOUNTER — APPOINTMENT (OUTPATIENT)
Dept: RADIOLOGY | Facility: HOSPITAL | Age: 78
End: 2023-04-09
Attending: EMERGENCY MEDICINE
Payer: COMMERCIAL

## 2023-04-09 VITALS
BODY MASS INDEX: 28.43 KG/M2 | RESPIRATION RATE: 16 BRPM | DIASTOLIC BLOOD PRESSURE: 79 MMHG | OXYGEN SATURATION: 97 % | HEIGHT: 67 IN | TEMPERATURE: 98.1 F | HEART RATE: 74 BPM | WEIGHT: 181.1 LBS | SYSTOLIC BLOOD PRESSURE: 127 MMHG

## 2023-04-09 DIAGNOSIS — R20.2 PARESTHESIAS: ICD-10-CM

## 2023-04-09 LAB
ALBUMIN UR-MCNC: NEGATIVE MG/DL
ANION GAP SERPL CALCULATED.3IONS-SCNC: 10 MMOL/L (ref 7–15)
APPEARANCE UR: CLEAR
BASOPHILS # BLD AUTO: 0 10E3/UL (ref 0–0.2)
BASOPHILS NFR BLD AUTO: 0 %
BILIRUB UR QL STRIP: NEGATIVE
BUN SERPL-MCNC: 21.8 MG/DL (ref 8–23)
CALCIUM SERPL-MCNC: 9.2 MG/DL (ref 8.8–10.2)
CHLORIDE SERPL-SCNC: 101 MMOL/L (ref 98–107)
COLOR UR AUTO: COLORLESS
CREAT SERPL-MCNC: 0.93 MG/DL (ref 0.67–1.17)
DEPRECATED HCO3 PLAS-SCNC: 26 MMOL/L (ref 22–29)
EOSINOPHIL # BLD AUTO: 0.2 10E3/UL (ref 0–0.7)
EOSINOPHIL NFR BLD AUTO: 4 %
ERYTHROCYTE [DISTWIDTH] IN BLOOD BY AUTOMATED COUNT: 12.6 % (ref 10–15)
GFR SERPL CREATININE-BSD FRML MDRD: 85 ML/MIN/1.73M2
GLUCOSE SERPL-MCNC: 120 MG/DL (ref 70–99)
GLUCOSE UR STRIP-MCNC: NEGATIVE MG/DL
HCT VFR BLD AUTO: 44.4 % (ref 40–53)
HGB BLD-MCNC: 14.4 G/DL (ref 13.3–17.7)
HGB UR QL STRIP: NEGATIVE
HOLD SPECIMEN: NORMAL
HOLD SPECIMEN: NORMAL
IMM GRANULOCYTES # BLD: 0 10E3/UL
IMM GRANULOCYTES NFR BLD: 0 %
KETONES UR STRIP-MCNC: NEGATIVE MG/DL
LEUKOCYTE ESTERASE UR QL STRIP: NEGATIVE
LYMPHOCYTES # BLD AUTO: 1.8 10E3/UL (ref 0.8–5.3)
LYMPHOCYTES NFR BLD AUTO: 30 %
MAGNESIUM SERPL-MCNC: 2 MG/DL (ref 1.7–2.3)
MCH RBC QN AUTO: 29.9 PG (ref 26.5–33)
MCHC RBC AUTO-ENTMCNC: 32.4 G/DL (ref 31.5–36.5)
MCV RBC AUTO: 92 FL (ref 78–100)
MONOCYTES # BLD AUTO: 0.5 10E3/UL (ref 0–1.3)
MONOCYTES NFR BLD AUTO: 8 %
MUCOUS THREADS #/AREA URNS LPF: PRESENT /LPF
NEUTROPHILS # BLD AUTO: 3.5 10E3/UL (ref 1.6–8.3)
NEUTROPHILS NFR BLD AUTO: 58 %
NITRATE UR QL: NEGATIVE
NRBC # BLD AUTO: 0 10E3/UL
NRBC BLD AUTO-RTO: 0 /100
PH UR STRIP: 5.5 [PH] (ref 5–7)
PLATELET # BLD AUTO: 203 10E3/UL (ref 150–450)
POTASSIUM SERPL-SCNC: 4.3 MMOL/L (ref 3.4–5.3)
RBC # BLD AUTO: 4.82 10E6/UL (ref 4.4–5.9)
RBC URINE: 1 /HPF
SODIUM SERPL-SCNC: 137 MMOL/L (ref 136–145)
SP GR UR STRIP: 1.02 (ref 1–1.03)
SQUAMOUS EPITHELIAL: <1 /HPF
UROBILINOGEN UR STRIP-MCNC: <2 MG/DL
WBC # BLD AUTO: 6 10E3/UL (ref 4–11)
WBC URINE: <1 /HPF

## 2023-04-09 PROCEDURE — 36415 COLL VENOUS BLD VENIPUNCTURE: CPT | Performed by: EMERGENCY MEDICINE

## 2023-04-09 PROCEDURE — 70544 MR ANGIOGRAPHY HEAD W/O DYE: CPT

## 2023-04-09 PROCEDURE — 80048 BASIC METABOLIC PNL TOTAL CA: CPT | Performed by: EMERGENCY MEDICINE

## 2023-04-09 PROCEDURE — A9585 GADOBUTROL INJECTION: HCPCS | Performed by: EMERGENCY MEDICINE

## 2023-04-09 PROCEDURE — 71046 X-RAY EXAM CHEST 2 VIEWS: CPT

## 2023-04-09 PROCEDURE — 70549 MR ANGIOGRAPH NECK W/O&W/DYE: CPT

## 2023-04-09 PROCEDURE — 81001 URINALYSIS AUTO W/SCOPE: CPT | Performed by: EMERGENCY MEDICINE

## 2023-04-09 PROCEDURE — 85025 COMPLETE CBC W/AUTO DIFF WBC: CPT | Performed by: EMERGENCY MEDICINE

## 2023-04-09 PROCEDURE — 255N000002 HC RX 255 OP 636: Performed by: EMERGENCY MEDICINE

## 2023-04-09 PROCEDURE — 70553 MRI BRAIN STEM W/O & W/DYE: CPT

## 2023-04-09 PROCEDURE — 70551 MRI BRAIN STEM W/O DYE: CPT

## 2023-04-09 PROCEDURE — 83735 ASSAY OF MAGNESIUM: CPT | Performed by: EMERGENCY MEDICINE

## 2023-04-09 PROCEDURE — 99285 EMERGENCY DEPT VISIT HI MDM: CPT | Mod: 25

## 2023-04-09 PROCEDURE — 93005 ELECTROCARDIOGRAM TRACING: CPT | Performed by: EMERGENCY MEDICINE

## 2023-04-09 RX ORDER — GADOBUTROL 604.72 MG/ML
8 INJECTION INTRAVENOUS ONCE
Status: COMPLETED | OUTPATIENT
Start: 2023-04-09 | End: 2023-04-09

## 2023-04-09 RX ADMIN — GADOBUTROL 8 ML: 604.72 INJECTION INTRAVENOUS at 13:02

## 2023-04-09 ASSESSMENT — ENCOUNTER SYMPTOMS
NUMBNESS: 1
CHILLS: 0
DIAPHORESIS: 0
FATIGUE: 1
FEVER: 0
HEADACHES: 0

## 2023-04-09 ASSESSMENT — ACTIVITIES OF DAILY LIVING (ADL): ADLS_ACUITY_SCORE: 35

## 2023-04-09 NOTE — ED PROVIDER NOTES
EMERGENCY DEPARTMENT ENCOUNTER      NAME: Kamille Gleason  AGE: 77 year old male  YOB: 1945  MRN: [id number]  EVALUATION DATE & TIME: No admission date for patient encounter.    PCP: Ford Rodriguez    ED PROVIDER: Laura Silva MD      Chief Complaint   Patient presents with     Numbness     Tingling         FINAL IMPRESSION:  1. Paresthesias          ED COURSE & MEDICAL DECISION MAKING:    Pertinent Labs & Imaging studies reviewed. (See chart for details)  77 year old male presents to the Emergency Department for evaluation of generalized fatigue, paresthesias in the face and bilateral legs.  Patient was initiated on gabapentin for peripheral neuropathy, he stopped taking this on Thursday thinking this was contributing to his symptoms.  However, given persistence of his symptoms he presents to the emergency department.  MRI of the brain, MRA of the head and neck is unremarkable.  Laboratory studies including hemoglobin, electrolytes, urinalysis within normal limits.  No clear etiology to explain the patient's symptoms.  Patient has no focal neurologic deficits on exam.  Recommend follow-up with PCP given no acute findings in the emergency department.    11:41 AM I met with patient for initial interview and encounter. We also discussed plan for treatment and diagnostic interventions.   2:45 PM I discussed plan for discharge with the patient and the patient's wife.  They are agreeable with this plan.    At the conclusion of the encounter I discussed the results of all of the tests and the disposition. The questions were answered. The patient or family acknowledged understanding and was agreeable with the care plan.         Medical Decision Making    History:    Supplemental history from: Documented in chart, if applicable and Family Member/Significant Other    External Record(s) reviewed: Documented in chart, if applicable.    Work Up:    Chart documentation includes differential considered  "and any EKGs or imaging independently interpreted by provider, where specified.    In additional to work up documented, I considered the following work up: Documented in chart, if applicable.    External consultation:    Discussion of management with another provider: Documented in chart, if applicable    Complicating factors:    Care impacted by chronic illness: Diabetes, Heart Disease, Hyperlipidemia and Other: BPH    Care affected by social determinants of health: N/A    Disposition considerations: Discharge. No recommendations on prescription strength medication(s). See documentation for any additional details.    MEDICATIONS GIVEN IN THE EMERGENCY:  Medications   gadobutrol (GADAVIST) injection 8 mL (8 mLs Intravenous $Given 4/9/23 1302)       NEW PRESCRIPTIONS STARTED AT TODAY'S ER VISIT  Discharge Medication List as of 4/9/2023  2:54 PM           =================================================================    HPI    Patient information was obtained from: Patient, Spouse    Use of : N/A    Kamille Gleason is a 77 year old male with a pertinent history of CAD, s/p CABG x3, DM II, BPH, HLD who presents to this ED by private car for evaluation of numbness/tingling.     Patient reports ongoing symptoms of numbness and tingling to his bilateral cheeks as well as bilateral thighs down to his ankles. He states that he was started on gabapentin 1-2 weeks ago for his neuropathy on the soles on his feet; however, he stopped taking it 3 nights ago after symptoms worsened around this time. Patient reports that he has felt more \"drowsy and fatigued\" along with the numbness and tingling. He states that the fatigue has fluctuated in severity the last few days. He is able to walk without issue, and denies and unsteadiness.     He otherwise denies any fever, chills, diaphoresis, headache, shortness of breath, chest pain, or vision changes.     REVIEW OF SYSTEMS   Review of Systems   Constitutional: Positive for " "fatigue (fluctuates in severity, \"drowsy\"). Negative for chills, diaphoresis and fever.   Eyes: Negative for visual disturbance.   Neurological: Positive for numbness (bilateral cheeks, bilateral thighs down to ankles). Negative for headaches.        PAST MEDICAL HISTORY:  Past Medical History:   Diagnosis Date     CAD (coronary artery disease)      Hyperlipidemia        PAST SURGICAL HISTORY:  Past Surgical History:   Procedure Laterality Date     BACK SURGERY       BYPASS GRAFT ARTERY CORONARY  2007    3-vessel     KNEE SURGERY Left        CURRENT MEDICATIONS:    aspirin (ASA) 325 MG tablet  cholecalciferol, vitamin D3, 2,000 unit Tab  nitroglycerin (NITROSTAT) 0.4 MG SL tablet  omeprazole (PRILOSEC) 20 MG capsule  psyllium (METAMUCIL) 3.4 gram packet  simvastatin (ZOCOR) 20 MG tablet  traMADol (ULTRAM) 50 mg tablet      ALLERGIES:  Allergies   Allergen Reactions     Atorvastatin Headache       FAMILY HISTORY:  Family History   Problem Relation Age of Onset     Coronary Artery Disease Brother        SOCIAL HISTORY:   Social History     Socioeconomic History     Marital status:    Tobacco Use     Smoking status: Former     Smokeless tobacco: Never     Tobacco comments:     pt quit 10 years ago   Substance and Sexual Activity     Alcohol use: Yes     Comment: 2 times per week most     Drug use: No       VITALS:  /79   Pulse 74   Temp 98.1  F (36.7  C) (Oral)   Resp 16   Ht 1.702 m (5' 7\")   Wt 82.1 kg (181 lb 1.6 oz)   SpO2 97%   BMI 28.36 kg/m      PHYSICAL EXAM    Gen:  Alert, awake, NAD  HENT:  Head atraumatic, PERRL, EOMI, no meningismus  Respiratory:  CTA, normal respiratory rate  Cardiovascular:  Regular rate and rhythm, no murmur  Abdomen:  Soft, nontender, normoactive bowel sounds  Musculoskeletal:  FROM in all extremities with no tenderness  Integument:  No rash, warm, dry  Neuro:  GCS 15, A & O x 3, CN II - XII intact, no dysdiadochokinesia, negative Romberg, no pronator drift, no " nystagmus, visual fields full to confrontation, normal gait, +5/5 strength in all extremities     LAB:  All pertinent labs reviewed and interpreted.  Results for orders placed or performed during the hospital encounter of 04/09/23   Chest XR,  PA & LAT    Impression    IMPRESSION: Sternotomy. Heart normal in size. Lungs are clear.   MRA Neck (Carotids) wo & w Contrast    Impression    IMPRESSION:  HEAD MRI:   1.  No significant change since 07/12/2021. No acute intracranial pathology.  2.  Stable minimal chronic small vessel ischemic disease and mild to moderate generalized brain parenchymal volume loss.    HEAD MRA:   1.  No significant change since 07/12/2021. Normal MRA [place].    NECK MRA:  1.  No significant change since 07/12/2021. Patent major cervical arteries. No large vessel occlusion or evidence of dissection.  2.  Mild atherosclerotic plaque at the left carotid bifurcation. No significant carotid stenosis.  3.  Widely patent vertebral arteries and right carotid artery.       MR Brain w/o & w Contrast    Impression    IMPRESSION:  HEAD MRI:   1.  No significant change since 07/12/2021. No acute intracranial pathology.  2.  Stable minimal chronic small vessel ischemic disease and mild to moderate generalized brain parenchymal volume loss.    HEAD MRA:   1.  No significant change since 07/12/2021. Normal MRA [place].    NECK MRA:  1.  No significant change since 07/12/2021. Patent major cervical arteries. No large vessel occlusion or evidence of dissection.  2.  Mild atherosclerotic plaque at the left carotid bifurcation. No significant carotid stenosis.  3.  Widely patent vertebral arteries and right carotid artery.       MRA Brain ([place]) wo Contrast    Impression    IMPRESSION:  HEAD MRI:   1.  No significant change since 07/12/2021. No acute intracranial pathology.  2.  Stable minimal chronic small vessel ischemic disease and mild to moderate generalized brain parenchymal  volume loss.    HEAD MRA:   1.  No significant change since 07/12/2021. Normal MRA [place].    NECK MRA:  1.  No significant change since 07/12/2021. Patent major cervical arteries. No large vessel occlusion or evidence of dissection.  2.  Mild atherosclerotic plaque at the left carotid bifurcation. No significant carotid stenosis.  3.  Widely patent vertebral arteries and right carotid artery.       Basic metabolic panel   Result Value Ref Range    Sodium 137 136 - 145 mmol/L    Potassium 4.3 3.4 - 5.3 mmol/L    Chloride 101 98 - 107 mmol/L    Carbon Dioxide (CO2) 26 22 - 29 mmol/L    Anion Gap 10 7 - 15 mmol/L    Urea Nitrogen 21.8 8.0 - 23.0 mg/dL    Creatinine 0.93 0.67 - 1.17 mg/dL    Calcium 9.2 8.8 - 10.2 mg/dL    Glucose 120 (H) 70 - 99 mg/dL    GFR Estimate 85 >60 mL/min/1.73m2   Result Value Ref Range    Magnesium 2.0 1.7 - 2.3 mg/dL   UA with Microscopic reflex to Culture    Specimen: Urine, Clean Catch   Result Value Ref Range    Color Urine Colorless Colorless, Straw, Light Yellow, Yellow    Appearance Urine Clear Clear    Glucose Urine Negative Negative mg/dL    Bilirubin Urine Negative Negative    Ketones Urine Negative Negative mg/dL    Specific Gravity Urine 1.016 1.001 - 1.030    Blood Urine Negative Negative    pH Urine 5.5 5.0 - 7.0    Protein Albumin Urine Negative Negative mg/dL    Urobilinogen Urine <2.0 <2.0 mg/dL    Nitrite Urine Negative Negative    Leukocyte Esterase Urine Negative Negative    Mucus Urine Present (A) None Seen /LPF    RBC Urine 1 <=2 /HPF    WBC Urine <1 <=5 /HPF    Squamous Epithelials Urine <1 <=1 /HPF   CBC with platelets and differential   Result Value Ref Range    WBC Count 6.0 4.0 - 11.0 10e3/uL    RBC Count 4.82 4.40 - 5.90 10e6/uL    Hemoglobin 14.4 13.3 - 17.7 g/dL    Hematocrit 44.4 40.0 - 53.0 %    MCV 92 78 - 100 fL    MCH 29.9 26.5 - 33.0 pg    MCHC 32.4 31.5 - 36.5 g/dL    RDW 12.6 10.0 - 15.0 %    Platelet Count 203 150 - 450 10e3/uL    %  Neutrophils 58 %    % Lymphocytes 30 %    % Monocytes 8 %    % Eosinophils 4 %    % Basophils 0 %    % Immature Granulocytes 0 %    NRBCs per 100 WBC 0 <1 /100    Absolute Neutrophils 3.5 1.6 - 8.3 10e3/uL    Absolute Lymphocytes 1.8 0.8 - 5.3 10e3/uL    Absolute Monocytes 0.5 0.0 - 1.3 10e3/uL    Absolute Eosinophils 0.2 0.0 - 0.7 10e3/uL    Absolute Basophils 0.0 0.0 - 0.2 10e3/uL    Absolute Immature Granulocytes 0.0 <=0.4 10e3/uL    Absolute NRBCs 0.0 10e3/uL   Extra Blue Top Tube   Result Value Ref Range    Hold Specimen JIC    Extra Red Top Tube   Result Value Ref Range    Hold Specimen JIC        RADIOLOGY:  Reviewed all pertinent imaging. Please see official radiology report.  Chest XR,  PA & LAT   Final Result   IMPRESSION: Sternotomy. Heart normal in size. Lungs are clear.      MRA Neck (Carotids) wo & w Contrast   Final Result   IMPRESSION:   HEAD MRI:    1.  No significant change since 07/12/2021. No acute intracranial pathology.   2.  Stable minimal chronic small vessel ischemic disease and mild to moderate generalized brain parenchymal volume loss.      HEAD MRA:    1.  No significant change since 07/12/2021. Normal MRA [place].      NECK MRA:   1.  No significant change since 07/12/2021. Patent major cervical arteries. No large vessel occlusion or evidence of dissection.   2.  Mild atherosclerotic plaque at the left carotid bifurcation. No significant carotid stenosis.   3.  Widely patent vertebral arteries and right carotid artery.            MRA Brain ([place]) wo Contrast   Final Result   IMPRESSION:   HEAD MRI:    1.  No significant change since 07/12/2021. No acute intracranial pathology.   2.  Stable minimal chronic small vessel ischemic disease and mild to moderate generalized brain parenchymal volume loss.      HEAD MRA:    1.  No significant change since 07/12/2021. Normal MRA [place].      NECK MRA:   1.  No significant change since 07/12/2021. Patent major  cervical arteries. No large vessel occlusion or evidence of dissection.   2.  Mild atherosclerotic plaque at the left carotid bifurcation. No significant carotid stenosis.   3.  Widely patent vertebral arteries and right carotid artery.            MR Brain w/o & w Contrast   Final Result   IMPRESSION:   HEAD MRI:    1.  No significant change since 07/12/2021. No acute intracranial pathology.   2.  Stable minimal chronic small vessel ischemic disease and mild to moderate generalized brain parenchymal volume loss.      HEAD MRA:    1.  No significant change since 07/12/2021. Normal MRA [place].      NECK MRA:   1.  No significant change since 07/12/2021. Patent major cervical arteries. No large vessel occlusion or evidence of dissection.   2.  Mild atherosclerotic plaque at the left carotid bifurcation. No significant carotid stenosis.   3.  Widely patent vertebral arteries and right carotid artery.                EKG:    Performed at: 12:03    Impression: Sinus rhythm with premature atrial complexes. Nonspecific T wave abnormality. Abnormal ECG.    Rate: 68 bpm  Rhythm: Sinus rhythm with PACs    OR Interval: 198 ms  QRS Interval: 104 ms  QTc Interval: 448 ms  ST Changes: None  Comparison: When compared to ECG from 7/12/21, there were no significant changes found.     I have independently reviewed and interpreted the EKG(s) documented above.      I, Akshat Fuentes, am serving as a scribe to document services personally performed by Laura Silva, based on my observation and the provider's statements to me. I, Laura Silva MD, attest that Akshat Fuentse is acting in a scribe capacity, has observed my performance of the services and has documented them in accordance with my direction.    Laura Silva MD  Emergency Medicine  [facility] EMERGENCY DEPARTMENT  [address] [place] [id number]  [phone]     Laura Silva MD  04/09/23 8176

## 2023-04-09 NOTE — ED TRIAGE NOTES
"Patient arrives to triage from home with chief complaint of numbness and tingling in bilateral face and knees down to his feet.  Patient reports symptoms started about a week ago, but worsening about three days ago.  Patient also reports he started Gabapentin on 03/28/23.  Since then he started to feel \"off\" like \"not his usual self.\"  Alert and oriented x4.       "

## 2023-04-11 LAB
ATRIAL RATE - MUSE: 68 BPM
DIASTOLIC BLOOD PRESSURE - MUSE: NORMAL MMHG
INTERPRETATION ECG - MUSE: NORMAL
P AXIS - MUSE: -9 DEGREES
PR INTERVAL - MUSE: 198 MS
QRS DURATION - MUSE: 104 MS
QT - MUSE: 422 MS
QTC - MUSE: 448 MS
R AXIS - MUSE: 64 DEGREES
SYSTOLIC BLOOD PRESSURE - MUSE: NORMAL MMHG
T AXIS - MUSE: 79 DEGREES
VENTRICULAR RATE- MUSE: 68 BPM

## 2023-05-06 ENCOUNTER — HEALTH MAINTENANCE LETTER (OUTPATIENT)
Age: 78
End: 2023-05-06

## 2023-06-01 ENCOUNTER — HOSPITAL ENCOUNTER (EMERGENCY)
Facility: HOSPITAL | Age: 78
Discharge: HOME | End: 2023-06-01
Attending: EMERGENCY MEDICINE | Admitting: EMERGENCY MEDICINE
Payer: COMMERCIAL

## 2023-06-01 ENCOUNTER — APPOINTMENT (OUTPATIENT)
Dept: RADIOLOGY | Facility: HOSPITAL | Age: 78
End: 2023-06-01
Attending: EMERGENCY MEDICINE
Payer: COMMERCIAL

## 2023-06-01 VITALS
OXYGEN SATURATION: 98 % | HEART RATE: 69 BPM | TEMPERATURE: 98.2 F | SYSTOLIC BLOOD PRESSURE: 128 MMHG | DIASTOLIC BLOOD PRESSURE: 66 MMHG | WEIGHT: 173 LBS | RESPIRATION RATE: 18 BRPM | BODY MASS INDEX: 27.1 KG/M2

## 2023-06-01 DIAGNOSIS — R07.89 CHEST WALL PAIN: ICD-10-CM
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
## 2023-06-01 PROCEDURE — 71046 X-RAY EXAM CHEST 2 VIEWS: CPT

## 2023-06-01 PROCEDURE — 99283 EMERGENCY DEPT VISIT LOW MDM: CPT | Mod: 25

## 2023-06-01 ASSESSMENT — ACTIVITIES OF DAILY LIVING (ADL): ADLS_ACUITY_SCORE: 35
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
## 2023-06-01 NOTE — ED NOTES
Patient  reviewed discharge.  Discussed printed discharge information.  Follow-up appts reviewed.   What s/s warrant return to the ER.    Importance of social distancing, good hand hygiene, and proper primary care follow-up to maintain health.

## 2023-10-07 ENCOUNTER — HEALTH MAINTENANCE LETTER (OUTPATIENT)
Age: 78
End: 2023-10-07

## 2024-02-24 ENCOUNTER — HEALTH MAINTENANCE LETTER (OUTPATIENT)
Age: 79
End: 2024-02-24

## 2024-07-13 ENCOUNTER — HEALTH MAINTENANCE LETTER (OUTPATIENT)
Age: 79
End: 2024-07-13

## 2024-09-04 ENCOUNTER — APPOINTMENT (OUTPATIENT)
Dept: RADIOLOGY | Facility: HOSPITAL | Age: 79
End: 2024-09-04
Attending: EMERGENCY MEDICINE
Payer: COMMERCIAL

## 2024-09-04 ENCOUNTER — HOSPITAL ENCOUNTER (OUTPATIENT)
Facility: HOSPITAL | Age: 79
Setting detail: OBSERVATION
Discharge: HOME | End: 2024-09-05
Attending: EMERGENCY MEDICINE | Admitting: INTERNAL MEDICINE
Payer: COMMERCIAL

## 2024-09-04 DIAGNOSIS — R61 DIAPHORESIS: ICD-10-CM

## 2024-09-04 DIAGNOSIS — R07.9 CHEST PAIN, UNSPECIFIED TYPE: ICD-10-CM

## 2024-09-04 DIAGNOSIS — I25.119 CORONARY ARTERY DISEASE INVOLVING NATIVE CORONARY ARTERY OF NATIVE HEART WITH ANGINA PECTORIS (H): Primary | ICD-10-CM

## 2024-09-04 DIAGNOSIS — R94.31 ACUTE ELECTROCARDIOGRAM CHANGES: ICD-10-CM

## 2024-09-04 LAB
ANION GAP SERPL CALCULATED.3IONS-SCNC: 8 MMOL/L (ref 7–15)
ATRIAL RATE - MUSE: 69 BPM
BASOPHILS # BLD AUTO: 0 10E3/UL (ref 0–0.2)
BASOPHILS NFR BLD AUTO: 1 %
BUN SERPL-MCNC: 20.4 MG/DL (ref 8–23)
CALCIUM SERPL-MCNC: 8.5 MG/DL (ref 8.8–10.4)
CHLORIDE SERPL-SCNC: 106 MMOL/L (ref 98–107)
CREAT SERPL-MCNC: 0.94 MG/DL (ref 0.67–1.17)
DIASTOLIC BLOOD PRESSURE - MUSE: 59 MMHG
EGFRCR SERPLBLD CKD-EPI 2021: 83 ML/MIN/1.73M2
EOSINOPHIL # BLD AUTO: 0.3 10E3/UL (ref 0–0.7)
EOSINOPHIL NFR BLD AUTO: 4 %
ERYTHROCYTE [DISTWIDTH] IN BLOOD BY AUTOMATED COUNT: 13 % (ref 10–15)
GLUCOSE SERPL-MCNC: 137 MG/DL (ref 70–99)
HCO3 SERPL-SCNC: 24 MMOL/L (ref 22–29)
HCT VFR BLD AUTO: 37.8 % (ref 40–53)
HGB BLD-MCNC: 12.3 G/DL (ref 13.3–17.7)
HOLD SPECIMEN: NORMAL
IMM GRANULOCYTES # BLD: 0 10E3/UL
IMM GRANULOCYTES NFR BLD: 0 %
INR PPP: 1.11 (ref 0.85–1.15)
INTERPRETATION ECG - MUSE: NORMAL
LYMPHOCYTES # BLD AUTO: 1.6 10E3/UL (ref 0.8–5.3)
LYMPHOCYTES NFR BLD AUTO: 26 %
MCH RBC QN AUTO: 29.6 PG (ref 26.5–33)
MCHC RBC AUTO-ENTMCNC: 32.5 G/DL (ref 31.5–36.5)
MCV RBC AUTO: 91 FL (ref 78–100)
MONOCYTES # BLD AUTO: 0.4 10E3/UL (ref 0–1.3)
MONOCYTES NFR BLD AUTO: 7 %
NEUTROPHILS # BLD AUTO: 3.8 10E3/UL (ref 1.6–8.3)
NEUTROPHILS NFR BLD AUTO: 62 %
NRBC # BLD AUTO: 0 10E3/UL
NRBC BLD AUTO-RTO: 1 /100
P AXIS - MUSE: 7 DEGREES
PLATELET # BLD AUTO: 173 10E3/UL (ref 150–450)
POTASSIUM SERPL-SCNC: 4.5 MMOL/L (ref 3.4–5.3)
PR INTERVAL - MUSE: 206 MS
QRS DURATION - MUSE: 96 MS
QT - MUSE: 422 MS
QTC - MUSE: 452 MS
R AXIS - MUSE: 60 DEGREES
RBC # BLD AUTO: 4.16 10E6/UL (ref 4.4–5.9)
SODIUM SERPL-SCNC: 138 MMOL/L (ref 135–145)
SYSTOLIC BLOOD PRESSURE - MUSE: 109 MMHG
T AXIS - MUSE: 75 DEGREES
TROPONIN T SERPL HS-MCNC: 10 NG/L
TROPONIN T SERPL HS-MCNC: 9 NG/L
VENTRICULAR RATE- MUSE: 69 BPM
WBC # BLD AUTO: 6.1 10E3/UL (ref 4–11)

## 2024-09-04 PROCEDURE — 84484 ASSAY OF TROPONIN QUANT: CPT | Performed by: EMERGENCY MEDICINE

## 2024-09-04 PROCEDURE — 99207 PR APP CREDIT; MD BILLING SHARED VISIT: CPT

## 2024-09-04 PROCEDURE — 71045 X-RAY EXAM CHEST 1 VIEW: CPT

## 2024-09-04 PROCEDURE — 80048 BASIC METABOLIC PNL TOTAL CA: CPT | Performed by: EMERGENCY MEDICINE

## 2024-09-04 PROCEDURE — 85025 COMPLETE CBC W/AUTO DIFF WBC: CPT | Performed by: EMERGENCY MEDICINE

## 2024-09-04 PROCEDURE — 250N000011 HC RX IP 250 OP 636: Performed by: EMERGENCY MEDICINE

## 2024-09-04 PROCEDURE — 99222 1ST HOSP IP/OBS MODERATE 55: CPT | Mod: FS | Performed by: INTERNAL MEDICINE

## 2024-09-04 PROCEDURE — 93005 ELECTROCARDIOGRAM TRACING: CPT | Performed by: STUDENT IN AN ORGANIZED HEALTH CARE EDUCATION/TRAINING PROGRAM

## 2024-09-04 PROCEDURE — 250N000013 HC RX MED GY IP 250 OP 250 PS 637

## 2024-09-04 PROCEDURE — 85610 PROTHROMBIN TIME: CPT | Performed by: EMERGENCY MEDICINE

## 2024-09-04 PROCEDURE — G0378 HOSPITAL OBSERVATION PER HR: HCPCS

## 2024-09-04 PROCEDURE — 250N000013 HC RX MED GY IP 250 OP 250 PS 637: Performed by: EMERGENCY MEDICINE

## 2024-09-04 PROCEDURE — 96374 THER/PROPH/DIAG INJ IV PUSH: CPT

## 2024-09-04 PROCEDURE — 99285 EMERGENCY DEPT VISIT HI MDM: CPT | Mod: 25

## 2024-09-04 PROCEDURE — 36415 COLL VENOUS BLD VENIPUNCTURE: CPT | Performed by: EMERGENCY MEDICINE

## 2024-09-04 PROCEDURE — 93005 ELECTROCARDIOGRAM TRACING: CPT | Performed by: EMERGENCY MEDICINE

## 2024-09-04 RX ORDER — ASPIRIN 81 MG/1
81 TABLET ORAL DAILY
Status: DISCONTINUED | OUTPATIENT
Start: 2024-09-05 | End: 2024-09-04

## 2024-09-04 RX ORDER — MORPHINE SULFATE 2 MG/ML
2 INJECTION, SOLUTION INTRAMUSCULAR; INTRAVENOUS ONCE
Status: COMPLETED | OUTPATIENT
Start: 2024-09-04 | End: 2024-09-04

## 2024-09-04 RX ORDER — ASPIRIN 325 MG
325 TABLET, DELAYED RELEASE (ENTERIC COATED) ORAL EVERY OTHER DAY
Status: DISCONTINUED | OUTPATIENT
Start: 2024-09-04 | End: 2024-09-04

## 2024-09-04 RX ORDER — ASPIRIN 325 MG
325 TABLET, DELAYED RELEASE (ENTERIC COATED) ORAL EVERY OTHER DAY
Status: ON HOLD | COMMUNITY
End: 2024-09-05

## 2024-09-04 RX ORDER — NITROGLYCERIN 0.4 MG/1
0.4 TABLET SUBLINGUAL EVERY 5 MIN PRN
Status: DISCONTINUED | OUTPATIENT
Start: 2024-09-04 | End: 2024-09-04

## 2024-09-04 RX ORDER — ACETAMINOPHEN 325 MG/1
650 TABLET ORAL EVERY 4 HOURS PRN
Status: DISCONTINUED | OUTPATIENT
Start: 2024-09-04 | End: 2024-09-05 | Stop reason: HOSPADM

## 2024-09-04 RX ORDER — ASPIRIN 81 MG/1
162 TABLET, CHEWABLE ORAL ONCE
Status: COMPLETED | OUTPATIENT
Start: 2024-09-04 | End: 2024-09-04

## 2024-09-04 RX ORDER — ASPIRIN 325 MG
325 TABLET, DELAYED RELEASE (ENTERIC COATED) ORAL EVERY OTHER DAY
Status: DISCONTINUED | OUTPATIENT
Start: 2024-09-05 | End: 2024-09-04

## 2024-09-04 RX ORDER — MAGNESIUM HYDROXIDE/ALUMINUM HYDROXICE/SIMETHICONE 120; 1200; 1200 MG/30ML; MG/30ML; MG/30ML
30 SUSPENSION ORAL EVERY 4 HOURS PRN
Status: DISCONTINUED | OUTPATIENT
Start: 2024-09-04 | End: 2024-09-05 | Stop reason: HOSPADM

## 2024-09-04 RX ORDER — PANTOPRAZOLE SODIUM 40 MG/1
40 TABLET, DELAYED RELEASE ORAL DAILY PRN
Status: DISCONTINUED | OUTPATIENT
Start: 2024-09-04 | End: 2024-09-05 | Stop reason: HOSPADM

## 2024-09-04 RX ORDER — SIMVASTATIN 10 MG
20 TABLET ORAL AT BEDTIME
Status: DISCONTINUED | OUTPATIENT
Start: 2024-09-04 | End: 2024-09-05 | Stop reason: HOSPADM

## 2024-09-04 RX ORDER — TRAMADOL HYDROCHLORIDE 50 MG/1
50 TABLET ORAL 2 TIMES DAILY PRN
Status: DISCONTINUED | OUTPATIENT
Start: 2024-09-04 | End: 2024-09-05 | Stop reason: HOSPADM

## 2024-09-04 RX ORDER — ASPIRIN 81 MG/1
81 TABLET ORAL DAILY
Status: DISCONTINUED | OUTPATIENT
Start: 2024-09-05 | End: 2024-09-05 | Stop reason: HOSPADM

## 2024-09-04 RX ORDER — ASPIRIN 81 MG/1
162 TABLET, CHEWABLE ORAL ONCE
Status: DISCONTINUED | OUTPATIENT
Start: 2024-09-04 | End: 2024-09-04

## 2024-09-04 RX ORDER — ACETAMINOPHEN 650 MG/1
650 SUPPOSITORY RECTAL EVERY 4 HOURS PRN
Status: DISCONTINUED | OUTPATIENT
Start: 2024-09-04 | End: 2024-09-05 | Stop reason: HOSPADM

## 2024-09-04 RX ORDER — NITROGLYCERIN 0.4 MG/1
0.4 TABLET SUBLINGUAL EVERY 5 MIN PRN
Status: DISCONTINUED | OUTPATIENT
Start: 2024-09-04 | End: 2024-09-05 | Stop reason: HOSPADM

## 2024-09-04 RX ADMIN — SIMVASTATIN 20 MG: 10 TABLET, FILM COATED ORAL at 21:38

## 2024-09-04 RX ADMIN — MORPHINE SULFATE 2 MG: 2 INJECTION, SOLUTION INTRAMUSCULAR; INTRAVENOUS at 15:39

## 2024-09-04 RX ADMIN — ASPIRIN 81 MG CHEWABLE TABLET 162 MG: 81 TABLET CHEWABLE at 16:02

## 2024-09-04 ASSESSMENT — ACTIVITIES OF DAILY LIVING (ADL)
ADLS_ACUITY_SCORE: 26
ADLS_ACUITY_SCORE: 24
ADLS_ACUITY_SCORE: 35
ADLS_ACUITY_SCORE: 26
ADLS_ACUITY_SCORE: 26
ADLS_ACUITY_SCORE: 35

## 2024-09-04 ASSESSMENT — COLUMBIA-SUICIDE SEVERITY RATING SCALE - C-SSRS
2. HAVE YOU ACTUALLY HAD ANY THOUGHTS OF KILLING YOURSELF IN THE PAST MONTH?: NO
1. IN THE PAST MONTH, HAVE YOU WISHED YOU WERE DEAD OR WISHED YOU COULD GO TO SLEEP AND NOT WAKE UP?: NO
6. HAVE YOU EVER DONE ANYTHING, STARTED TO DO ANYTHING, OR PREPARED TO DO ANYTHING TO END YOUR LIFE?: NO

## 2024-09-04 NOTE — ED PROVIDER NOTES
"  Emergency Department Encounter     Evaluation Date & Time:   9/4/2024  3:10 PM    CHIEF COMPLAINT:  Chest Pain      Triage Note:Pt to room 2 via [facility] from home d/t left sided CP and some diaphoresis. Pain does not radiate. Took \"400mg aspirin\" PTA EMS. Given 2 SL nitroglycerin upon EMS arrival with relief. VSS. NSR.      Triage Assessment (Adult)       Row Name 09/04/24 1515          Triage Assessment    Airway WDL WDL        Respiratory WDL    Respiratory WDL WDL        Skin Circulation/Temperature WDL    Skin Circulation/Temperature WDL WDL        Cardiac WDL    Cardiac WDL WDL     Cardiac Rhythm NSR        Peripheral/Neurovascular WDL    Peripheral Neurovascular WDL WDL        Cognitive/Neuro/Behavioral WDL    Cognitive/Neuro/Behavioral WDL WDL                         FINAL IMPRESSION:    ICD-10-CM    1. Diaphoresis  R61       2. Chest pain, unspecified type  R07.9       3. Acute electrocardiogram changes  R94.31           Impression and Plan     ED COURSE & MEDICAL DECISION MAKING:    3:26 PM I introduced myself to the patient, obtained patient history, performed a physical exam, and discussed plan for ED workup including potential diagnostic laboratory/imaging studies and interventions.   6:23 PM Spoke to hospitalist, Dr. Wharton.     ED Course as of 09/04/24 1829   Wed Sep 04, 2024   0576 Patient has a history of coronary artery disease status post bypass almost 20 years ago now and presents for new onset of diaphoresis followed by left-sided chest discomfort and aching/numbness in his bilateral jaw/face area.  He did not believe that he was hyperventilating at the time to cause the face numbness and there was no cramping of his hands to associate with it.  On EMS arrival they did note him to be diaphoretic.  He notes that this was abnormal as he was just standing in his kitchen doing some cooking.  He he did not have any other discomfort that would cause the diaphoresis like stomach upset or new " onset pains in his back for example.  So, this is concerning for cardiac.  No real radiating pain to his back though he recently has had some lower back issues with heavy lifting over the last few days.  Cannot reproduce his pain so does not seem to be muscle spasm either and he does not describe any acid reflux.  He had slightly hypoxic after nitroglycerin with EMS.  His initial EKG reviewed by me does show some T wave inversions in the anterior precordial leads that have progressed to involve more of the anterior heart when compared to previous EKG from April 2023.  So, with new EKG changes and concerning history I am discussing with the patient in observation admission to the hospital for chest pain and he is agreeable to that.    With his recent back discomfort, low threshold for doing CTA of the chest and to rule out dissection.   1806 Patient's 2 hour trop is negative.  If the EKG changes were from a cardiac event today I would suspect he would have a leak.  However, the history is quite concerning here with an ACS type of presentation.  So, as discussed with the patient will admit for more urgent cardiac workup and some sort of stress testing or angiogram that I suspect he will need, but that will be up to admit md.   1828 No further cp or diaphoresis episodes while here so no further ntg or heparin for now with no troponin leak.  Patient admitted to  cardiac tele.       At the conclusion of the encounter I discussed the results of all the tests and the disposition. The questions were answered. The patient or family acknowledged understanding and was agreeable with the care plan.          0 minutes of critical care time        MEDICATIONS GIVEN IN THE EMERGENCY DEPARTMENT:  Medications   aspirin (ASA) chewable tablet 162 mg (162 mg Oral $Given 9/4/24 1602)   morphine (PF) injection 2 mg (2 mg Intravenous $Given 9/4/24 1539)       NEW PRESCRIPTIONS STARTED AT TODAY'S ED VISIT:  New Prescriptions    No  medications on file       HPI     Kamille Gleason is a 78 year old male with a pertinent history of triple bypass and chronic back pain who presents to this ED via EMS for evaluation of chest pain.    Per EMS, patient was given two nitroglycerin en route, as well as 400 mg of aspirin. This resolved his symptoms to a 1/10 chest pain. His chest pain was left sided with no radiating factors. EMS noted diaphoresis, and patient reported diarrhea. 12 lead EKG was normal, VSS, oxygen levels slightly dipped after nitroglycerin but recovered.     Per patient, he was cooking today (did not feel he was exerting himself much at all) when he had an onset of quite abnormal diaphoresis all over that was followed by 5/10 left sided chest pain. He also endorsed facial numbness and aching along his jawline with this onset, which concerned him enough to call EMS. The pain has resolved, but the facial numbness is still there. He does endorse a history of  a triple bypass in 2007.  Since then he really does not get frequent chest pain.  He states he has maybe been to the ER 4 times since then for an evaluation.  Does not take nitroglycerin on a regular basis.  He does not feel that he was hyperventilating at the time to cause numbness on his face and does not have any muscle contractions in his arms or hands when this was occurring.  More described as an aching numbness first along his left side of his jaw and then along the right.    EMS arrived to find him diaphoretic, they gave him 2 nitroglycerin doses which improved his symptoms.  Now about a 1 out of 10.  He took what he believes is aspirin at home but he says it was a white tablet that was 200 mg and does have ibuprofen that are this similar color.    He had three episodes of soft stool within two hours today, which is more frequent than normal for him. His bowel movements were normal colored. He also endorses intermittent back pain, but it is unclear if this is new onset as he  "reports this as a chronic condition. He had surgery for a herniated disc in 2006. He does endorse recent exertion from doing yard work two days ago. He is a former smoker, and quit 13 years ago. Denies shortness of breath, cough, or cold symptoms. Denies hand cramping.    He follows with [facility] for primary care, and [facility] for cardiology.    REVIEW OF SYSTEMS:  See HPI,  remainder of systems are all otherwise negative.      I, Toma Dill, am serving as a scribe to document services personally performed by Jesica Canchola MD, based on my observations and the provider's statements to me.  I, Jesica Canchola MD,  attest that Toma Dill is acting in a scribe capacity, has observed my performance of the services and has documented them in accordance with my direction.     Medical History     Past Medical History:   Diagnosis Date    CAD (coronary artery disease)     Hyperlipidemia        Past Surgical History:   Procedure Laterality Date    BACK SURGERY      BYPASS GRAFT ARTERY CORONARY  2007    3-vessel    KNEE SURGERY Left        Family History   Problem Relation Age of Onset    Coronary Artery Disease Brother        Social History     Tobacco Use    Smoking status: Former    Smokeless tobacco: Never    Tobacco comments:     pt quit 10 years ago   Substance Use Topics    Alcohol use: Yes     Comment: 2 times per week most    Drug use: No       aspirin (ASA) 325 MG EC tablet  cholecalciferol, vitamin D3, 2,000 unit Tab  nitroglycerin (NITROSTAT) 0.4 MG SL tablet  omeprazole (PRILOSEC) 20 MG capsule  psyllium (METAMUCIL) 3.4 gram packet  simvastatin (ZOCOR) 20 MG tablet  traMADol (ULTRAM) 50 mg tablet        Physical Exam     First Vitals:  Patient Vitals for the past 24 hrs:   BP Temp Temp src Pulse Resp SpO2 Height Weight   09/04/24 1530 116/60 -- -- 64 28 96 % -- --   09/04/24 1514 109/59 98.5  F (36.9  C) Oral 65 18 95 % 1.702 m (5' 7\") 78.9 kg (174 lb) "       PHYSICAL EXAM:   Constitutional:   Conversive, sitting semi upright in bed.   HENT:  Normocephalic, posterior pharynx wnl, mucous membranes moist and dark pink.   Eyes:  PERRL, EOMI, Conjunctiva normal, No discharge, no scleral icterus.  Respiratory:  Breathing easily, lungs clear to auscultation  Cardiovascular:  Normal rate and rhythm, nl s1s2 0 murmurs, rubs, or gallops.  Peripheral pulses dp, pt, and radial are wnl.  No peripheral edema   GI:  Bowel sounds normal, Soft, No tenderness, No flank tenderness, nondistended.  :No CVA tenderness.   Musculoskeletal:  Moves all extremities.  No erythematous or swollen major joints,   Integument:  Normal color, not diaphoretic.  Neurologic:  Alert & oriented x 3, Normal motor function, Normal sensory function, No focal deficits noted. Normal speech.  Psychiatric:  Affect normal, Judgment normal, Mood normal.     Results     LAB AND RADIOLOGY:  All pertinent labs reviewed and interpreted  Results for orders placed or performed during the hospital encounter of 09/04/24   XR Chest Port 1 View     Status: None    Narrative    EXAM: XR CHEST PORT 1 VIEW  LOCATION: [facility]  DATE: 9/4/2024    INDICATION: Chest pain.   COMPARISON: Chest radiograph 6/1/2023.       Impression    IMPRESSION:     Lung volumes are low, with mild bibasilar atelectasis. Upper lungs are clear. No pleural effusions or pneumothorax. Normal pulmonary vascularity.    Cardiac size is within normal limits, accounting for the shallow inspiration. Median sternotomy wires and scattered mediastinal clips are unchanged.   [place] Draw     Status: None    Narrative    The following orders were created for panel order [place] Draw.  Procedure                               Abnormality         Status                     ---------                               -----------         ------                     Extra Blue Top Tube[[id number]]                              Final result                Extra Red Top Tube[[id number]]                               Final result               Extra Green Top (Lithium...[[id number]]                      Final result               Extra Purple Top Tube[[id number]]                            Final result                 Please view results for these tests on the individual orders.   Extra Blue Top Tube     Status: None   Result Value Ref Range    Hold Specimen JIC    Extra Red Top Tube     Status: None   Result Value Ref Range    Hold Specimen JIC    Extra Green Top (Lithium Heparin) Tube     Status: None   Result Value Ref Range    Hold Specimen JIC    Extra Purple Top Tube     Status: None   Result Value Ref Range    Hold Specimen JIC    INR     Status: Normal   Result Value Ref Range    INR 1.11 0.85 - 1.15   Basic metabolic panel     Status: Abnormal   Result Value Ref Range    Sodium 138 135 - 145 mmol/L    Potassium 4.5 3.4 - 5.3 mmol/L    Chloride 106 98 - 107 mmol/L    Carbon Dioxide (CO2) 24 22 - 29 mmol/L    Anion Gap 8 7 - 15 mmol/L    Urea Nitrogen 20.4 8.0 - 23.0 mg/dL    Creatinine 0.94 0.67 - 1.17 mg/dL    GFR Estimate 83 >60 mL/min/1.73m2    Calcium 8.5 (L) 8.8 - 10.4 mg/dL    Glucose 137 (H) 70 - 99 mg/dL   Troponin T, High Sensitivity     Status: Normal   Result Value Ref Range    Troponin T, High Sensitivity 10 <=22 ng/L   Troponin T, High Sensitivity     Status: Normal   Result Value Ref Range    Troponin T, High Sensitivity 9 <=22 ng/L   CBC with platelets and differential     Status: Abnormal   Result Value Ref Range    WBC Count 6.1 4.0 - 11.0 10e3/uL    RBC Count 4.16 (L) 4.40 - 5.90 10e6/uL    Hemoglobin 12.3 (L) 13.3 - 17.7 g/dL    Hematocrit 37.8 (L) 40.0 - 53.0 %    MCV 91 78 - 100 fL    MCH 29.6 26.5 - 33.0 pg    MCHC 32.5 31.5 - 36.5 g/dL    RDW 13.0 10.0 - 15.0 %    Platelet Count 173 150 - 450 10e3/uL    % Neutrophils 62 %    % Lymphocytes 26 %    % Monocytes 7 %    % Eosinophils 4 %    % Basophils 1 %    % Immature Granulocytes 0 %     NRBCs per 100 WBC 1 (H) <1 /100    Absolute Neutrophils 3.8 1.6 - 8.3 10e3/uL    Absolute Lymphocytes 1.6 0.8 - 5.3 10e3/uL    Absolute Monocytes 0.4 0.0 - 1.3 10e3/uL    Absolute Eosinophils 0.3 0.0 - 0.7 10e3/uL    Absolute Basophils 0.0 0.0 - 0.2 10e3/uL    Absolute Immature Granulocytes 0.0 <=0.4 10e3/uL    Absolute NRBCs 0.0 10e3/uL   CBC with platelets differential     Status: Abnormal    Narrative    The following orders were created for panel order CBC with platelets differential.  Procedure                               Abnormality         Status                     ---------                               -----------         ------                     CBC with platelets and d...[[id number]]  Abnormal            Final result                 Please view results for these tests on the individual orders.         ECG:    Performed at: 15:12 9/4/24    Impression: Sinus rhythm with premature atrial complexes. T wave abnormality, consider anterior ischemia.    Rate: 69 bpm  Rhythm: Sinus rhythm with premature atrial complexes.  Axis: 7 60 75  NV Interval: 206 ms  QRS Interval: 96 ms  QTc Interval: 452 ms  ST Changes: No  Comparison: When compared with ECG of 4/9/23 12:03, T wave inversion more evident in anterior leads.  Was present in v1-2 now also present in v3 and biphasic in v4.    I have independently reviewed and interpreted the EKS(s) documented above    PROCEDURES:  Procedures:      [facility] Documentation     Medical Decision Making  Obtained supplemental history:Supplemental history obtained?: EMS  Reviewed external records: External records reviewed?: Documented in chart  Care impacted by chronic illness:Chronic Pain and Heart Disease  Care significantly affected by social determinants of health:N/A  Did you consider but not order tests?: Work up considered but not performed and documented in chart, if applicable  Did you interpret images independently?: Independent interpretation of ECG and  images noted in documentation, when applicable.  Consultation discussion with other provider:Did you involve another provider (consultant, , pharmacy, etc.)?: I discussed the care with another health care provider, see documentation for details.  Admit.             The creation of this record is based on the scribe s observations of the work being performed by Jesica Canchola MD, and the provider s statements to them. This document has been checked and approved by MD Jesica Barr MD  Emergency Medicine  [facility] EMERGENCY DEPARTMENT         Jesica Canchola MD  09/04/24 4162       Jesica Canchola MD  09/04/24 8868

## 2024-09-04 NOTE — ED TRIAGE NOTES
"Pt to room 2 via [facility] from home d/t left sided CP and some diaphoresis after moving heavy items. Pain does not radiate. Took \"400mg aspirin\" PTA EMS. Given 2 SL nitroglycerin upon EMS arrival with relief. VSS. NSR.      Triage Assessment (Adult)       Row Name 09/04/24 1515          Triage Assessment    Airway WDL WDL        Respiratory WDL    Respiratory WDL WDL        Skin Circulation/Temperature WDL    Skin Circulation/Temperature WDL WDL        Cardiac WDL    Cardiac WDL WDL     Cardiac Rhythm NSR        Peripheral/Neurovascular WDL    Peripheral Neurovascular WDL WDL        Cognitive/Neuro/Behavioral WDL    Cognitive/Neuro/Behavioral WDL WDL                     "

## 2024-09-04 NOTE — ED NOTES
Bed: JN-02  Expected date:   Expected time:   Means of arrival: Ambulance  Comments:  [facility]: Chest pain, 78,

## 2024-09-04 NOTE — MEDICATION SCRIBE - ADMISSION MEDICATION HISTORY
Medication Scribe Admission Medication History    Admission medication history is complete. The information provided in this note is only as accurate as the sources available at the time of the update.    Information Source(s): Patient and CareEverywhere/SureScripts via in-person    Pertinent Information:     Changes made to PTA medication list:  Added: None  Deleted: None  Changed: None    Allergies reviewed with patient and updates made in EHR: yes    Medication History Completed By: CAL BOSTON 9/4/2024 5:13 PM    PTA Med List   Medication Sig Last Dose    aspirin (ASA) 325 MG EC tablet Take 325 mg by mouth every other day. 9/3/2024 at am    cholecalciferol, vitamin D3, 2,000 unit Tab [CHOLECALCIFEROL, VITAMIN D3, 2,000 UNIT TAB] Take 2,000 Units by mouth daily. 9/4/2024 at am    nitroglycerin (NITROSTAT) 0.4 MG SL tablet [NITROGLYCERIN (NITROSTAT) 0.4 MG SL TABLET] Place 0.4 mg under the tongue every 5 (five) minutes as needed. Unknown at prn    omeprazole (PRILOSEC) 20 MG capsule Take 20 mg by mouth daily as needed. Unknown at prn    psyllium (METAMUCIL) 3.4 gram packet [PSYLLIUM (METAMUCIL) 3.4 GRAM PACKET] Take 1 packet by mouth daily as needed.  Unknown at prn    simvastatin (ZOCOR) 20 MG tablet Take 20 mg by mouth at bedtime. 9/3/2024 at pm    traMADol (ULTRAM) 50 mg tablet Take 50 mg by mouth 2 times daily as needed. 9/4/2024 at am

## 2024-09-05 ENCOUNTER — APPOINTMENT (OUTPATIENT)
Dept: CARDIOLOGY | Facility: HOSPITAL | Age: 79
End: 2024-09-05
Payer: COMMERCIAL

## 2024-09-05 ENCOUNTER — APPOINTMENT (OUTPATIENT)
Dept: NUCLEAR MEDICINE | Facility: HOSPITAL | Age: 79
End: 2024-09-05
Payer: COMMERCIAL

## 2024-09-05 VITALS
TEMPERATURE: 98 F | HEIGHT: 67 IN | BODY MASS INDEX: 27.61 KG/M2 | HEART RATE: 53 BPM | DIASTOLIC BLOOD PRESSURE: 58 MMHG | OXYGEN SATURATION: 98 % | WEIGHT: 175.93 LBS | RESPIRATION RATE: 17 BRPM | SYSTOLIC BLOOD PRESSURE: 119 MMHG

## 2024-09-05 LAB
CV STRESS CURRENT BP HE: NORMAL
CV STRESS CURRENT HR HE: 61
CV STRESS CURRENT HR HE: 63
CV STRESS CURRENT HR HE: 65
CV STRESS CURRENT HR HE: 73
CV STRESS CURRENT HR HE: 74
CV STRESS CURRENT HR HE: 78
CV STRESS CURRENT HR HE: 81
CV STRESS CURRENT HR HE: 82
CV STRESS CURRENT HR HE: 83
CV STRESS CURRENT HR HE: 85
CV STRESS DEVIATION TIME HE: NORMAL
CV STRESS ECHO PERCENT HR HE: NORMAL
CV STRESS EXERCISE STAGE HE: NORMAL
CV STRESS FINAL RESTING BP HE: NORMAL
CV STRESS FINAL RESTING HR HE: 73
CV STRESS MAX HR HE: 92
CV STRESS MAX TREADMILL GRADE HE: 0
CV STRESS MAX TREADMILL SPEED HE: 0
CV STRESS PEAK DIA BP HE: NORMAL
CV STRESS PEAK SYS BP HE: NORMAL
CV STRESS PHASE HE: NORMAL
CV STRESS PROTOCOL HE: NORMAL
CV STRESS RESTING PT POSITION HE: NORMAL
CV STRESS ST DEVIATION AMOUNT HE: NORMAL
CV STRESS ST DEVIATION ELEVATION HE: NORMAL
CV STRESS ST EVELATION AMOUNT HE: NORMAL
CV STRESS TEST TYPE HE: NORMAL
CV STRESS TOTAL STAGE TIME MIN 1 HE: NORMAL
GLUCOSE BLDC GLUCOMTR-MCNC: 123 MG/DL (ref 70–99)
RATE PRESSURE PRODUCT: NORMAL
STRESS ECHO BASELINE DIASTOLIC HE: 68
STRESS ECHO BASELINE HR: 60
STRESS ECHO BASELINE SYSTOLIC BP: 139
STRESS ECHO CALCULATED PERCENT HR: 64 %
STRESS ECHO LAST STRESS DIASTOLIC BP: 72
STRESS ECHO LAST STRESS HR: 83
STRESS ECHO LAST STRESS SYSTOLIC BP: 129
STRESS ECHO TARGET HR: 143

## 2024-09-05 PROCEDURE — 93017 CV STRESS TEST TRACING ONLY: CPT

## 2024-09-05 PROCEDURE — A9500 TC99M SESTAMIBI: HCPCS

## 2024-09-05 PROCEDURE — 343N000001 HC RX 343

## 2024-09-05 PROCEDURE — 93018 CV STRESS TEST I&R ONLY: CPT | Performed by: INTERNAL MEDICINE

## 2024-09-05 PROCEDURE — 99239 HOSP IP/OBS DSCHRG MGMT >30: CPT | Mod: FS | Performed by: STUDENT IN AN ORGANIZED HEALTH CARE EDUCATION/TRAINING PROGRAM

## 2024-09-05 PROCEDURE — 82962 GLUCOSE BLOOD TEST: CPT

## 2024-09-05 PROCEDURE — 93016 CV STRESS TEST SUPVJ ONLY: CPT | Performed by: INTERNAL MEDICINE

## 2024-09-05 PROCEDURE — 250N000013 HC RX MED GY IP 250 OP 250 PS 637

## 2024-09-05 PROCEDURE — G0378 HOSPITAL OBSERVATION PER HR: HCPCS

## 2024-09-05 PROCEDURE — 78452 HT MUSCLE IMAGE SPECT MULT: CPT | Mod: 26 | Performed by: INTERNAL MEDICINE

## 2024-09-05 PROCEDURE — 250N000013 HC RX MED GY IP 250 OP 250 PS 637: Performed by: INTERNAL MEDICINE

## 2024-09-05 PROCEDURE — 78452 HT MUSCLE IMAGE SPECT MULT: CPT

## 2024-09-05 PROCEDURE — 250N000011 HC RX IP 250 OP 636

## 2024-09-05 PROCEDURE — 99207 PR APP CREDIT; MD BILLING SHARED VISIT: CPT

## 2024-09-05 RX ORDER — NALOXONE HYDROCHLORIDE 0.4 MG/ML
0.4 INJECTION, SOLUTION INTRAMUSCULAR; INTRAVENOUS; SUBCUTANEOUS
Status: DISCONTINUED | OUTPATIENT
Start: 2024-09-05 | End: 2024-09-05 | Stop reason: HOSPADM

## 2024-09-05 RX ORDER — AMINOPHYLLINE 25 MG/ML
50-100 INJECTION, SOLUTION INTRAVENOUS
Status: DISCONTINUED | OUTPATIENT
Start: 2024-09-05 | End: 2024-09-05 | Stop reason: HOSPADM

## 2024-09-05 RX ORDER — NALOXONE HYDROCHLORIDE 0.4 MG/ML
0.2 INJECTION, SOLUTION INTRAMUSCULAR; INTRAVENOUS; SUBCUTANEOUS
Status: DISCONTINUED | OUTPATIENT
Start: 2024-09-05 | End: 2024-09-05 | Stop reason: HOSPADM

## 2024-09-05 RX ORDER — REGADENOSON 0.08 MG/ML
0.4 INJECTION, SOLUTION INTRAVENOUS ONCE
Status: COMPLETED | OUTPATIENT
Start: 2024-09-05 | End: 2024-09-05

## 2024-09-05 RX ORDER — ASPIRIN 81 MG/1
81 TABLET ORAL DAILY
Qty: 30 TABLET | Refills: 0 | Status: SHIPPED | OUTPATIENT
Start: 2024-09-06

## 2024-09-05 RX ADMIN — Medication 8.8 MILLICURIE: at 12:41

## 2024-09-05 RX ADMIN — REGADENOSON 0.4 MG: 0.08 INJECTION, SOLUTION INTRAVENOUS at 13:14

## 2024-09-05 RX ADMIN — ASPIRIN 81 MG: 81 TABLET, COATED ORAL at 08:06

## 2024-09-05 RX ADMIN — TRAMADOL HYDROCHLORIDE 50 MG: 50 TABLET, COATED ORAL at 06:13

## 2024-09-05 RX ADMIN — Medication 30 MILLICURIE: at 13:00

## 2024-09-05 ASSESSMENT — ACTIVITIES OF DAILY LIVING (ADL)
ADLS_ACUITY_SCORE: 26
ADLS_ACUITY_SCORE: 28
ADLS_ACUITY_SCORE: 28
ADLS_ACUITY_SCORE: 26
ADLS_ACUITY_SCORE: 28
ADLS_ACUITY_SCORE: 26
ADLS_ACUITY_SCORE: 28

## 2024-09-05 NOTE — PLAN OF CARE
Goal Outcome Evaluation:      Plan of Care Reviewed With: patient    Overall Patient Progress: improvingOverall Patient Progress: improving    Pt A&O. VSS in RA. Denies pain. Tele- NSR. Pt ambulates ind to bathroom.    Ela Deshpande RN

## 2024-09-05 NOTE — PLAN OF CARE
Goal Outcome Evaluation:      Plan of Care Reviewed With: patient    Overall Patient Progress: improvingOverall Patient Progress: improving      Pt A&O. Vitals stable in except Bradycardic. Ind to bathroom. NPO midnight, for stress test this AM. Denies chest pain;/SOB. C/o headache pain decrease with PRN tramadol.     Ela Deshpande RN

## 2024-09-05 NOTE — PROGRESS NOTES
Patient discharged to home at 1501 via Private Car.  Accompanied by spouse and son and staff.  Discharge instructions were reviewed with patient, spouse, and son, opportunity offered to ask questions.    Prescriptions N/A.  Access discontinued: Yes  Care plan and education discontinued: Yes  Home meds retrieved from pharmacy: No  Belongings were sent home with patient/family:  Cell phone/electronics: cell phone and Clothing: Shirt(s):   and Pants:   , sandals

## 2024-09-05 NOTE — H&P
[facility]    History and Physical - Hospitalist Service       Date of Admission:  9/4/2024    Assessment & Plan      Kamille Gleason is a 78 year old male with a pertinent history of triple bypass and chronic back pain who presents for evaluation of chest pain. Patient states he was cooking today when he had an onset of quite abnormal diaphoresis all over that was followed by left sided chest pain. He also endorsed facial numbness.  Chest pain, diaphoresis, and facial numbness now resolved .Patient states he no longer has symptoms after nitroglycerin was given to him by EMS.  Patient admits that he has had similar episodes of chest pain over the last few years which she has had stress test done and they have been negative.  Patient and family agree with plan to admit to observation and have stress test in a.m.     # Chest pain  # Diaphoresis  EKG reviewed normal sinus rhythm, no ectopy no arrhythmias  Cardiac telemetry monitor normal sinus rhythm  Trop negative x 2  Exercise stress test ordered for a.m.  Patient n.p.o. after midnight  CXR result:Lung volumes are low, with mild bibasilar atelectasis. Upper lungs are clear. No pleural effusions or pneumothorax. Normal pulmonary vascularity.Cardiac size is within normal limits, accounting for the shallow inspiration. Median sternotomy wires and scattered mediastinal clips are unchanged.  Cardiology consult tomorrow if stress test positive  Resume home aspirin  Resume home nitro as needed for chest pain    # Chronic back pain  Resume tramadol as needed    #GERD  Prilosec    # Anemia, chronic  Hemoglobin 12.3 today, patient's approximate baseline 12.7-13.9  Trend CBC in a.m.    # Hyperlipidemia  Resume home simvastatin          Observation Goals: List all goals to be met before discharge home: , - Serial troponins and stress test complete., - Seen and cleared by consultant if applicable, - Adequate pain control on oral analgesia, - Vital signs  "normal or at patient baseline, - Safe disposition plan has been identified, - Nurse to notify provider when observation goals have been met and patient is ready for discharge.  Diet: Combination Diet Low Saturated Fat Na <2400mg Diet, No Caffeine Diet  NPO per Anesthesia Guidelines for Procedure/Surgery Except for: Meds    DVT Prophylaxis: Pneumatic Compression Devices  Salazar Catheter: Not present  Lines: None     Cardiac Monitoring: ACTIVE order. Indication: Tachyarrhythmias, acute (48 hours)  Code Status: Full Code      Clinically Significant Risk Factors Present on Admission                # Drug Induced Platelet Defect: home medication list includes an antiplatelet medication           # Overweight: Estimated body mass index is 27.25 kg/m  as calculated from the following:    Height as of this encounter: 1.702 m (5' 7\").    Weight as of this encounter: 78.9 kg (174 lb).        # History of CABG: noted on surgical history             Disposition Plan     Medically Ready for Discharge: Anticipated Tomorrow         The patient's care was discussed with the Attending Physician, Dr. Wharton .    Leticia Olivera NP  Hospitalist Service  [facility]  Securely message with [employer] (more info)  Text page via [facility] Paging/Directory     ______________________________________________________________________    Chief Complaint   Chest Pain  Diaphoresis    History is obtained from the patient, family, chart    History of Present Illness     Kamille Gleason is a 78 year old male with a pertinent history of triple bypass and chronic back pain who presents for evaluation of chest pain. Patient states he was cooking today when he had an onset of quite abnormal diaphoresis all over that was followed by left sided chest pain. He also endorsed facial numbness.  Chest pain, diaphoresis, and facial numbness now resolved .Patient states he no longer has symptoms after nitroglycerin was given to him by EMS.  Patient " admits that he has had similar episodes of chest pain over the last few years which she has had stress test done and they have been negative.  Patient states he was cooking today  when he had an onset of quite abnormal diaphoresis all over that was followed by 5/10 left sided chest pain. He also endorsed facial numbness and aching along his jawline with this onset, which concerned him enough to call EMS. The pain has resolved, but the facial numbness is still there. He does endorse a history of  a triple bypass in 2007.    EMS arrived to find him diaphoretic, they gave him 2 nitroglycerin doses which improved his symptoms.  Now about a 1 out of 10.  He took what he believes is aspirin at home but he says it was a white tablet that was 200 mg and does have ibuprofen that are this similar color.He had three episodes of soft stool within two hours today, which is more frequent than normal for him. His bowel movements were normal colored. He also endorses intermittent back pain, but it is unclear if this is new onset as he reports this as a chronic condition. He had surgery for a herniated disc in 2006. He does endorse recent exertion from doing yard work two days ago. He is a former smoker, and quit 13 years ago. Denies shortness of breath, cough, or cold symptoms.  Patient denies nausea vomiting diarrhea.  Patient denies dizziness or palpitations.   Patient states he has no symptoms at this time.      Past Medical History    Past Medical History:   Diagnosis Date    CAD (coronary artery disease)     Hyperlipidemia        Past Surgical History   Past Surgical History:   Procedure Laterality Date    BACK SURGERY      BYPASS GRAFT ARTERY CORONARY  2007    3-vessel    KNEE SURGERY Left        Prior to Admission Medications   Prior to Admission Medications   Prescriptions Last Dose Informant Patient Reported? Taking?   aspirin (ASA) 325 MG EC tablet 9/3/2024 at am  Yes Yes   Sig: Take 325 mg by mouth every other day.    cholecalciferol, vitamin D3, 2,000 unit Tab 9/4/2024 at am  Yes Yes   Sig: [CHOLECALCIFEROL, VITAMIN D3, 2,000 UNIT TAB] Take 2,000 Units by mouth daily.   nitroglycerin (NITROSTAT) 0.4 MG SL tablet Unknown at prn  Yes Yes   Sig: [NITROGLYCERIN (NITROSTAT) 0.4 MG SL TABLET] Place 0.4 mg under the tongue every 5 (five) minutes as needed.   omeprazole (PRILOSEC) 20 MG capsule Unknown at prn  Yes Yes   Sig: Take 20 mg by mouth daily as needed.   psyllium (METAMUCIL) 3.4 gram packet Unknown at prn  Yes Yes   Sig: [PSYLLIUM (METAMUCIL) 3.4 GRAM PACKET] Take 1 packet by mouth daily as needed.    simvastatin (ZOCOR) 20 MG tablet 9/3/2024 at pm  Yes Yes   Sig: Take 20 mg by mouth at bedtime.   traMADol (ULTRAM) 50 mg tablet 9/4/2024 at am  Yes Yes   Sig: Take 50 mg by mouth 2 times daily as needed.      Facility-Administered Medications: None        Review of Systems    The 10 point Review of Systems is negative other than noted in the HPI or here.     Social History   I have reviewed this patient's social history and updated it with pertinent information if needed.  Social History     Tobacco Use    Smoking status: Former    Smokeless tobacco: Never    Tobacco comments:     pt quit 10 years ago   Substance Use Topics    Alcohol use: Yes     Comment: 2 times per week most    Drug use: No         Family History   I have reviewed this patient's family history and updated it with pertinent information if needed.  Family History   Problem Relation Age of Onset    Coronary Artery Disease Brother          Allergies   Allergies   Allergen Reactions    Gabapentin Other (See Comments)     Numbness, paresthesias, fatigue    Atorvastatin Headache        Physical Exam   Vital Signs: Temp: 98.5  F (36.9  C) Temp src: Oral BP: 116/60 Pulse: 64   Resp: 28 SpO2: 96 % O2 Device: None (Room air)    Weight: 174 lbs 0 oz    Constitutional: awake, alert, cooperative, no apparent distress, and appears stated age  Hematologic / Lymphatic: no  cervical lymphadenopathy and no supraclavicular lymphadenopathy  Respiratory: No increased work of breathing, good air exchange, clear to auscultation bilaterally, no crackles or wheezing  Cardiovascular: Normal apical impulse, regular rate and rhythm, normal S1 and S2, no S3 or S4, and no murmur noted  GI: No scars, normal bowel sounds, soft, non-distended, non-tender, no masses palpated, no hepatosplenomegally  Skin: no bruising or bleeding, normal skin color, texture, turgor, no redness, warmth, or swelling, no rashes, and no lesions  Musculoskeletal: There is no redness, warmth, or swelling of the joints.   Neurologic: Awake, alert, oriented to name, place and time.    Neuropsychiatric: General: normal, calm, and normal eye contact    Medical Decision Making       75 MINUTES SPENT BY ME on the date of service doing chart review, history, exam, documentation & further activities per the note.      Data     I have personally reviewed the following data over the past 24 hrs:    6.1  \   12.3 (L)   / 173     138 106 20.4 /  137 (H)   4.5 24 0.94 \     Trop: 9 BNP: N/A     INR:  1.11 PTT:  N/A   D-dimer:  N/A Fibrinogen:  N/A       Imaging results reviewed over the past 24 hrs:   Recent Results (from the past 24 hour(s))   XR Chest Port 1 View    Narrative    EXAM: XR CHEST PORT 1 VIEW  LOCATION: [facility]  DATE: 9/4/2024    INDICATION: Chest pain.   COMPARISON: Chest radiograph 6/1/2023.       Impression    IMPRESSION:     Lung volumes are low, with mild bibasilar atelectasis. Upper lungs are clear. No pleural effusions or pneumothorax. Normal pulmonary vascularity.    Cardiac size is within normal limits, accounting for the shallow inspiration. Median sternotomy wires and scattered mediastinal clips are unchanged.

## 2024-09-05 NOTE — PROGRESS NOTES
Patient admitted to room 13 at approximately 1930 via wheel chair from emergency room.  Reason for Admission: Chest pain & diaphoresis  Report received from: Written report Javier Reed RN   Patient was accompanied by Spouse and Daughter.  Discharge transportation provided by:  Patient ambulated/transferred:  independently. self.  Patient is alert and orientated x 4.  Outpatient Observation education provided to: (patient, family, friend)  MDRO Education done if applicable (MRSA, VRE, etc)  Safety risks were identified during admission:  none.   Yellow risk/fall band applied:  No  Home meds sent home: No  Home meds sent to pharmacy:No   Detailed Belongings: Cell phone, CP .     Pt came from ED without PIV cath. As per pt he felt pain on the site that's why it was removed before he came in Obs unit.     Ela Deshpande RN

## 2024-09-05 NOTE — ED NOTES
"[facility] ED Handoff Report    ED Chief Complaint: CP, SOB    ED Diagnosis:  (R61) Diaphoresis    (R07.9) Chest pain, unspecified type    (R94.31) Acute electrocardiogram changes    PMH:    Past Medical History:   Diagnosis Date    CAD (coronary artery disease)     Hyperlipidemia         Code Status:  No Order     Falls Risk: No Band: Not applicable    Current Living Situation/Residence: lives with a significant other     Elimination Status: Continent: Yes     Activity Level: Independent    Patients Preferred Language:  English     Needed: No    Vital Signs:  /60   Pulse 64   Temp 98.5  F (36.9  C) (Oral)   Resp 28   Ht 1.702 m (5' 7\")   Wt 78.9 kg (174 lb)   SpO2 96%   BMI 27.25 kg/m       Pain Score: 0/10    Is the Patient Confused:  No    Last Food or Drink: 09/04/24    Focused Assessment:  Pt not currently in pain. Denies SOB. No complaints    Tests Performed: Done: Labs and Imaging    Treatments Provided:  ASA, morphine    Family Dynamics/Concerns: No    RN: Javier Reed RN 9/4/2024 7:03 PM        "

## 2024-09-05 NOTE — PROGRESS NOTES
PRIMARY DIAGNOSIS: CHEST PAIN  OUTPATIENT/OBSERVATION GOALS TO BE MET BEFORE DISCHARGE:  1. Ruled out acute coronary syndrome (negative or stable Troponin):  Yes  2. Pain Status: Pain free.  3. Appropriate provocative testing performed: N/A  - Stress Test Procedure: Exercise stress test  - Interpretation of cardiac rhythm per telemetry tech: NSR    4. Cleared by Consultants (if applicable):No  5. Return to near baseline physical activity: Yes  Discharge Planner Nurse   Safe discharge environment identified: No  Barriers to discharge: Yes       Entered by: Ela Deshpande RN 09/04/2024 11:40 PM       Pt A&O. Vitals stable except Boone. Denies pain. NPO Midnight for exercise stress test.      Please review provider order for any additional goals.   Nurse to notify provider when observation goals have been met and patient is ready for discharge.

## 2024-09-05 NOTE — PLAN OF CARE
"PRIMARY DIAGNOSIS: \"GENERIC\" NURSING  OUTPATIENT/OBSERVATION GOALS TO BE MET BEFORE DISCHARGE:  ADLs back to baseline: Yes    Activity and level of assistance: Ambulating independently.    Pain status: Pain free.    Return to near baseline physical activity: Yes     Discharge Planner Nurse   Safe discharge environment identified: Yes  Barriers to discharge: Yes       Entered by: Zeina Ochoa RN 09/05/2024 10:21 AM  Pt A&O x 4, denies pain after Tramadol for headache, Denies SOB, N/V, Tele: Sinus Boone, NPO for nuclear stress test today.    Please review provider order for any additional goals.   Nurse to notify provider when observation goals have been met and patient is ready for discharge.Goal Outcome Evaluation:                        "

## 2024-09-05 NOTE — PROGRESS NOTES
"PRIMARY DIAGNOSIS: \"GENERIC\" NURSING  OUTPATIENT/OBSERVATION GOALS TO BE MET BEFORE DISCHARGE:  ADLs back to baseline: Yes    Activity and level of assistance: Ambulating independently.    Pain status: Pain free.    Return to near baseline physical activity: Yes     Discharge Planner Nurse   Safe discharge environment identified: Yes  Barriers to discharge: No       Entered by: Zeina Ochoa RN 09/05/2024 2:15 PM   Pt had normal nuclear stress test today, plan to discharge today. Tele: SR, Denies pain, tolerating regular diet.   Please review provider order for any additional goals.   Nurse to notify provider when observation goals have been met and patient is ready for discharge.  "

## 2024-09-05 NOTE — DISCHARGE SUMMARY
"[facility]  Hospitalist Discharge Summary      Date of Admission:  9/4/2024  Date of Discharge:  9/5/2024  Discharging Provider: Faith Hensley NP  Discharge Service: Hospitalist Service    Discharge Diagnoses   Chest pain/diaphoresis    Clinically Significant Risk Factors     # Overweight: Estimated body mass index is 27.55 kg/m  as calculated from the following:    Height as of this encounter: 1.702 m (5' 7\").    Weight as of this encounter: 79.8 kg (175 lb 14.8 oz).       Follow-ups Needed After Discharge       Unresulted Labs Ordered in the Past 30 Days of this Admission       No orders found for last 31 day(s).            Discharge Disposition   Discharged to home  Condition at discharge: Stable    Hospital Course      Kamille Gleason is a 78 year old male with a pertinent history of triple bypass and chronic back pain who presents for evaluation of chest pain. Patient states he was cooking when he had an onset of quite abnormal diaphoresis all over that was followed by left sided chest pain. He also endorsed facial numbness.  Chest pain, diaphoresis, and facial numbness resolved after nitroglycerin was given.by EMS.   Patient admits that he has had similar episodes of chest pain over the last few years which she has had stress test done and they have been negative.     Chest pain  Diaphoresis  -EKG-Normal sinus rhythm.  No signs of ischemia  -Continue with cardiac telemetry  -Troponin-10-->9  -Lexiscan stress test-normal  -CXR result:Lung volumes are low, with mild bibasilar atelectasis. Upper lungs are clear. No pleural effusions or pneumothorax. Normal pulmonary vascularity.Cardiac size is within normal limits, accounting for the shallow inspiration. Median sternotomy wires and scattered mediastinal clips are unchanged.  -Cardiology consult tomorrow if stress test positive  -Aspirin 81 mg daily  -Resume home nitro as needed for chest pain    Chronic back pain  -Resume tramadol as " needed    GERD  -Prilosec    Anemia, chronic  -Hemoglobin 12.3 on admit, patient's approximate baseline 12.7-13.9    Hyperlipidemia  Resume home simvastatin         Consultations This Hospital Stay   None    Code Status   Full Code    Time Spent on this Encounter   I, Faith Hensley NP, personally saw the patient today and spent greater than 30 minutes discharging this patient.       Faith Hensley NP  [facility] EXTENDED RECOVERY AND SHORT STAY  [address] [place] [id number]  Phone: [phone]  Fax: [phone]  ______________________________________________________________________    Physical Exam   Vital Signs: Temp: 98  F (36.7  C) Temp src: Oral BP: 119/58 Pulse: 53   Resp: 17 SpO2: 98 % O2 Device: None (Room air)    Weight: 175 lbs 14.83 oz  Constitutional: awake, alert, cooperative, no apparent distress, and appears stated age  Respiratory: No increased work of breathing, good air exchange, clear to auscultation bilaterally, no crackles or wheezing  Cardiovascular: Normal apical impulse, regular rate and rhythm, normal S1 and S2, no S3 or S4, and no murmur noted  GI: No scars, normal bowel sounds, soft, non-distended, non-tender, no masses palpated, no hepatosplenomegally       Primary Care Physician   Ford Rodriguez    Discharge Orders      Reason for your hospital stay    Chest pain  Diaphoresis     Follow-up and recommended labs and tests     Follow up with primary care provider, Ford Rodriguez, within 7 days for hospital follow- up.  No follow up labs or test are needed.     Activity    Your activity upon discharge: activity as tolerated     Diet    Follow this diet upon discharge: Regular       Significant Results and Procedures   Results for orders placed or performed during the hospital encounter of 09/04/24   XR Chest Port 1 View    Narrative    EXAM: XR CHEST PORT 1 VIEW  LOCATION: [facility]  DATE:  9/4/2024    INDICATION: Chest pain.   COMPARISON: Chest radiograph 6/1/2023.       Impression    IMPRESSION:     Lung volumes are low, with mild bibasilar atelectasis. Upper lungs are clear. No pleural effusions or pneumothorax. Normal pulmonary vascularity.    Cardiac size is within normal limits, accounting for the shallow inspiration. Median sternotomy wires and scattered mediastinal clips are unchanged.   NM Lexiscan stress test     Value    Pharmacologic Protocol Lexiscan    Test Type Pharmacological    Baseline HR 60    Baseline Systolic     Baseline Diastolic BP 68    Last Stress HR 83    Last Stress Systolic     Last Stress Diastolic BP 72    Target     PERCENT HR 85%    ST Deviation Elevation aVL 0.3mm    Deviation Time III -0.6mm    ST Elevation Amount V2 0.6mm    ST Deviation Amount he III -0.8mm    Final Resting /63    Final Resting HR 73    Max Treadmill Speed 0.0    Max Treadmill Grade 0.0    Peak Systolic /80    Peak Diastolic /80    Max HR  92    Stress Phase Resting    Stress Resting Pt Position MANUAL EVENT    Current HR 61    Current /68    Stress Phase Stress    Stage Minute EXE 00:00    Exercise Stage STAGE 2    Current HR 65    Current /68    Stress Phase Stress    Stage Minute EXE 01:00    Exercise Stage STAGE 3    Current HR 63    Current /68    Stress Phase Stress    Stage Minute EXE 01:48    Exercise Stage STAGE 3    Current HR 83    Current /57    Stress Phase Stress    Stage Minute EXE 02:00    Exercise Stage STAGE 4    Current HR 85    Current /57    Stress Phase Stress    Stage Minute EXE 03:00    Exercise Stage STAGE 5    Current HR 85    Current /57    Stress Phase Stress    Stage Minute EXE 03:02    Exercise Stage STAGE 5    Current HR 85    Current /72    Stress Phase Stress    Stage Minute EXE 04:00    Exercise Stage STAGE 6    Current HR 83    Current /72    Stress Phase Stress    Stage Minute EXE  04:00    Exercise Stage STAGE 6    Current HR 83    Current /72    Stress Phase Recovery    Stage Minute REC 00:54    Exercise Stage Recovery    Current HR 81    Current /80    Stress Phase Recovery    Stage Minute REC 00:59    Exercise Stage Recovery    Current HR 82    Current /80    Stress Phase Recovery    Stage Minute REC 01:59    Exercise Stage Recovery    Current HR 78    Current /80    Stress Phase Recovery    Stage Minute REC 02:59    Exercise Stage Recovery    Current HR 74    Current /80    Stress Phase Recovery    Stage Minute REC 03:06    Exercise Stage Recovery    Current HR 74    Current /63    Stress Phase Recovery    Stage Minute REC 03:59    Exercise Stage Recovery    Current HR 74    Current /63    Stress Phase Recovery    Stage Minute REC 04:01    Exercise Stage Recovery    Current HR 73    Current /63    Max Predicted HR  64    Rate Pressure Product 11,868.0    Narrative      Lexiscan stress ECG negative for ischemia.    The nuclear stress test is negative for inducible myocardial ischemia   or infarction.    The left ventricular ejection fraction at stress is greater than 70%.    The patient is at a low risk of future cardiac ischemic events.    A prior study was conducted on 1/7/2020.  No interval change noted.    The findings of this examination were communicated to the nursing staff   at [facility].         Discharge Medications   Current Discharge Medication List        CONTINUE these medications which have CHANGED    Details   aspirin 81 MG EC tablet Take 1 tablet (81 mg) by mouth daily.  Qty: 30 tablet, Refills: 0    Associated Diagnoses: Coronary artery disease involving native coronary artery of native heart with angina pectoris (H24)           CONTINUE these medications which have NOT CHANGED    Details   cholecalciferol, vitamin D3, 2,000 unit Tab [CHOLECALCIFEROL, VITAMIN D3, 2,000 UNIT TAB] Take 2,000 Units by mouth daily.       nitroglycerin (NITROSTAT) 0.4 MG SL tablet [NITROGLYCERIN (NITROSTAT) 0.4 MG SL TABLET] Place 0.4 mg under the tongue every 5 (five) minutes as needed.      omeprazole (PRILOSEC) 20 MG capsule Take 20 mg by mouth daily as needed.      psyllium (METAMUCIL) 3.4 gram packet [PSYLLIUM (METAMUCIL) 3.4 GRAM PACKET] Take 1 packet by mouth daily as needed.       simvastatin (ZOCOR) 20 MG tablet Take 20 mg by mouth at bedtime.      traMADol (ULTRAM) 50 mg tablet Take 50 mg by mouth 2 times daily as needed.           Allergies   Allergies   Allergen Reactions    Gabapentin Other (See Comments)     Numbness, paresthesias, fatigue    Atorvastatin Headache

## 2024-09-05 NOTE — PROGRESS NOTES
PRIMARY DIAGNOSIS: CHEST PAIN  OUTPATIENT/OBSERVATION GOALS TO BE MET BEFORE DISCHARGE:  1. Ruled out acute coronary syndrome (negative or stable Troponin):  Yes  2. Pain Status: Pain free.  3. Appropriate provocative testing performed: N/A  - Stress Test Procedure: Exercise stress test   - Interpretation of cardiac rhythm per telemetry tech: Sinus bradycardia HR 50's    4. Cleared by Consultants (if applicable):No  5. Return to near baseline physical activity: Yes  Discharge Planner Nurse   Safe discharge environment identified: No  Barriers to discharge: Yes       Entered by: Ela Deshpande RN 09/05/2024 3:23 AM    VSS in RA except Bradycardic. Denies chest pain or SOB.  at 0200. NPO midnight for stress test in AM.      Please review provider order for any additional goals.   Nurse to notify provider when observation goals have been met and patient is ready for discharge.

## 2024-09-05 NOTE — PROGRESS NOTES
PRIMARY DIAGNOSIS: CHEST PAIN  OUTPATIENT/OBSERVATION GOALS TO BE MET BEFORE DISCHARGE:  1. Ruled out acute coronary syndrome (negative or stable Troponin):  Yes  2. Pain Status: Improved-controlled with oral pain medications.  3. Appropriate provocative testing performed: N/A  - Stress Test Procedure: Exercise stress test   - Interpretation of cardiac rhythm per telemetry tech: Sinus [place] HR 50's    4. Cleared by Consultants (if applicable):No  5. Return to near baseline physical activity: Yes  Discharge Planner Nurse   Safe discharge environment identified: No  Barriers to discharge: Yes       Entered by: Ela Deshpande RN 09/05/2024 6:40 AM     Please review provider order for any additional goals.   Nurse to notify provider when observation goals have been met and patient is ready for discharge.

## 2024-09-05 NOTE — PROGRESS NOTES
Nuclear Pharmacological Stress Test    Sitting Protocol. Lexiscan 0.4mg IV administered over 15sec.   Baseline VS: HR= 61 (SR with freq PAC's)  BP= 139/68  Peak VS: HR= 85   BP= 110/57  No symptoms of CP produce and no typical vasodilator side effects from Lexiscan noted. No further dietary restrictions according to stress test. Results pending cardiology interpretation.     Results pending cardiology interpretation.      Feli Smiley RN

## 2024-11-30 ENCOUNTER — HEALTH MAINTENANCE LETTER (OUTPATIENT)
Age: 79
End: 2024-11-30

## 2024-12-27 ENCOUNTER — HOSPITAL ENCOUNTER (EMERGENCY)
Facility: HOSPITAL | Age: 79
Discharge: HOME | End: 2024-12-27
Attending: EMERGENCY MEDICINE | Admitting: EMERGENCY MEDICINE

## 2024-12-27 VITALS
BODY MASS INDEX: 26.78 KG/M2 | OXYGEN SATURATION: 97 % | TEMPERATURE: 98.3 F | HEART RATE: 90 BPM | HEIGHT: 68 IN | RESPIRATION RATE: 18 BRPM | DIASTOLIC BLOOD PRESSURE: 75 MMHG | SYSTOLIC BLOOD PRESSURE: 138 MMHG | WEIGHT: 176.7 LBS

## 2024-12-27 DIAGNOSIS — G89.29 ACUTE EXACERBATION OF CHRONIC LOW BACK PAIN: ICD-10-CM

## 2024-12-27 DIAGNOSIS — M54.50 ACUTE EXACERBATION OF CHRONIC LOW BACK PAIN: ICD-10-CM

## 2024-12-27 PROCEDURE — 99283 EMERGENCY DEPT VISIT LOW MDM: CPT

## 2024-12-27 PROCEDURE — 250N000013 HC RX MED GY IP 250 OP 250 PS 637: Performed by: EMERGENCY MEDICINE

## 2024-12-27 RX ORDER — PREDNISONE 20 MG/1
TABLET ORAL
Qty: 10 TABLET | Refills: 0 | Status: SHIPPED | OUTPATIENT
Start: 2024-12-27 | End: 2025-01-16

## 2024-12-27 RX ORDER — PREGABALIN 50 MG/1
50 CAPSULE ORAL ONCE
Status: COMPLETED | OUTPATIENT
Start: 2024-12-27 | End: 2024-12-27

## 2024-12-27 RX ADMIN — PREGABALIN 50 MG: 50 CAPSULE ORAL at 20:13

## 2024-12-27 ASSESSMENT — COLUMBIA-SUICIDE SEVERITY RATING SCALE - C-SSRS
1. IN THE PAST MONTH, HAVE YOU WISHED YOU WERE DEAD OR WISHED YOU COULD GO TO SLEEP AND NOT WAKE UP?: NO
2. HAVE YOU ACTUALLY HAD ANY THOUGHTS OF KILLING YOURSELF IN THE PAST MONTH?: NO
6. HAVE YOU EVER DONE ANYTHING, STARTED TO DO ANYTHING, OR PREPARED TO DO ANYTHING TO END YOUR LIFE?: NO

## 2024-12-27 ASSESSMENT — ACTIVITIES OF DAILY LIVING (ADL): ADLS_ACUITY_SCORE: 50

## 2024-12-27 NOTE — ED TRIAGE NOTES
Patient has been having back pain for the last 10 days. Patient has been waiting for his lyrica to be filled but there seems to be issues with insurance. Patient states lower back pain is now constant and does not let up. Patient states that now he has urinary frequency as well. Patient states he has had kidney stones before but this feels different. Patient denies injuring his back.     Triage Assessment (Adult)       Row Name 12/27/24 8063          Triage Assessment    Airway WDL WDL        Respiratory WDL    Respiratory WDL WDL        Skin Circulation/Temperature WDL    Skin Circulation/Temperature WDL WDL        Cardiac WDL    Cardiac WDL WDL        Peripheral/Neurovascular WDL    Peripheral Neurovascular WDL WDL        Cognitive/Neuro/Behavioral WDL    Cognitive/Neuro/Behavioral WDL WDL

## 2024-12-28 NOTE — ED PROVIDER NOTES
Emergency Department Encounter     Evaluation Date & Time:   12/27/2024  7:42 PM    CHIEF COMPLAINT:  Back Pain      Triage Note:Patient has been having back pain for the last 10 days. Patient has been waiting for his lyrica to be filled but there seems to be issues with insurance. Patient states lower back pain is now constant and does not let up. Patient states that now he has urinary frequency as well. Patient states he has had kidney stones before but this feels different. Patient denies injuring his back.     Triage Assessment (Adult)       Row Name 12/27/24 1753          Triage Assessment    Airway WDL WDL        Respiratory WDL    Respiratory WDL WDL        Skin Circulation/Temperature WDL    Skin Circulation/Temperature WDL WDL        Cardiac WDL    Cardiac WDL WDL        Peripheral/Neurovascular WDL    Peripheral Neurovascular WDL WDL        Cognitive/Neuro/Behavioral WDL    Cognitive/Neuro/Behavioral WDL WDL                         FINAL IMPRESSION:    ICD-10-CM    1. Acute exacerbation of chronic low back pain  M54.50     G89.29           Impression and Plan     ED COURSE & MEDICAL DECISION MAKING:    I reviewed his most recent chart from his office visit on December 18 of this year.  They note that he is as he mentions chronically on tramadol twice a day and uses ibuprofen on average 800 mg daily.  He has no symptoms of peptic ulcer disease currently.  His pain is in his same area as has been described on previous clinic notes due to a previously herniated disc.  However, they did try prednisone presumably to see if there is a newly pinched nerve that this might help.  He did improve while he was on it but now that he has been off of it for a few days his symptoms are returning and becoming more intense again.  We discussed that this is meant to be diagnostic and give him some relief but that he does need to continue with the plan discussed with his primary care physician to set up a spine clinic  appointment so that they can look and see if there is a newly pinched nerve that could be injected with steroids right at the nerve to give him longer acting relief and that he should not be continuously on prednisone.  However, there was a delay in his pinky of his Lyrica due to what sounds like need for prior authorization from his primary care clinic and so I think we could do 1 more course of steroid for him since he tolerated the previous one well without symptoms of peptic ulcer disease.  Then, I will give him a first dose of Lyrica tonight just so he knows how it affects him in case he has any adverse reaction he will know if this is something to continue to pursue or not but have encouraged him that it is actually quite a good medication for nerve pain and chronic pain.  He is excited to try it.  His wife will be driving him home.    He has not had any new incontinence or symptoms of cauda equina.  No new imaging is needed today.  No other symptoms to suggest an alternate cause of his pain like GI or .    At the conclusion of the encounter I discussed the results of all the tests and the disposition. The questions were answered. The patient or family acknowledged understanding and was agreeable with the care plan.          0 minutes of critical care time        MEDICATIONS GIVEN IN THE EMERGENCY DEPARTMENT:  Medications   pregabalin (LYRICA) capsule 50 mg (has no administration in time range)       NEW PRESCRIPTIONS STARTED AT TODAY'S ED VISIT:  New Prescriptions    PREDNISONE (DELTASONE) 20 MG TABLET    Take two tablets (= 40mg) each day for 5 (five) days       HPI     HPI     Kamille Gleason is a 79 year old male with a pertinent history of chronic low back pain related to lumbar degenerative disc disease and previous surgery for herniated disc in 2006 with a fusion.  Who presents to this ED with his wife for evaluation of worsened low back pain.  He has had a slowly progressive worsening of his chronic  low back pain.  He states he takes tramadol 50 mg twice a day for this but the although he has done that for a long time it has stopped working as well.  So, he did see his primary care physician this month and they recommended he see spine specialist and in the interim they did try a course of prednisone for him.  He had a 5-day supply and did notice some improvement while he was on it.  The second plan was to start with Lyrica for the nerve pain, but because of some insurance issues he needed specific paperwork from his clinic.  He did call them today and they said they would get right back to him and finish that paperwork right away today but they did not call him back.  He is under the impression that should not be a long time for this to get done.  Otherwise no new incontinence, weakness, abdominal discomfort, changes in urination or stooling, nausea vomiting, or fevers.  No new trauma.  The pain is in the same location as it typically is in his mid lower back without radiation    REVIEW OF SYSTEMS:  Review of Systems  remainder of systems are all otherwise negative.        Medical History     Past Medical History:   Diagnosis Date    CAD (coronary artery disease)     Hyperlipidemia    Type 2 diabetes with neuropathy, chronic pain syndrome, coronary artery disease    Past Surgical History:   Procedure Laterality Date    BACK SURGERY      BYPASS GRAFT ARTERY CORONARY  2007    3-vessel    KNEE SURGERY Left        Family History   Problem Relation Age of Onset    Coronary Artery Disease Brother        Social History     Tobacco Use    Smoking status: Former    Smokeless tobacco: Never    Tobacco comments:     pt quit 10 years ago   Substance Use Topics    Alcohol use: Yes     Comment: 2 times per week most    Drug use: No       predniSONE (DELTASONE) 20 MG tablet  aspirin 81 MG EC tablet  cholecalciferol, vitamin D3, 2,000 unit Tab  nitroglycerin (NITROSTAT) 0.4 MG SL tablet  omeprazole (PRILOSEC) 20 MG  "capsule  psyllium (METAMUCIL) 3.4 gram packet  simvastatin (ZOCOR) 20 MG tablet  traMADol (ULTRAM) 50 mg tablet        Physical Exam     First Vitals:  Patient Vitals for the past 24 hrs:   BP Temp Temp src Pulse Resp SpO2 Height Weight   12/27/24 1751 (!) 143/82 98.3  F (36.8  C) Oral 85 18 96 % 1.715 m (5' 7.5\") 80.2 kg (176 lb 11.2 oz)       PHYSICAL EXAM:   Constitutional:   in nad sitting up in bed and was seen ambulatory through department with normal gait, was also able to get to sitting on the side of thebed without difficulty. Very pleasant male in nad  HENT:  Normocephalic, posterior pharynx wnl, mucous membranes moist and dark pink   Eyes:  PERRL, EOMI, Conjunctiva normal, No discharge, no scleral icterus.  Respiratory:  Breathing easily, cta  Cardiovascular: rrr   Peripheral pulses dp, pt, and radial are wnl.  no peripheral edema   Musculoskeletal:  Moves all extremities.  No erythematous or swollen major joints, he indicates his mid lower back is the area of pain.  This is a postsurgical area that is old and well-healed.  No new redness or swelling in the area.  See the constitutional for mobility evaluation.  Integument: Normal color including in the area indicated on his lower back without a rash.  Neurologic:  Alert & oriented x 3, Normal motor function,  No focal deficits noted. Normal speech.  DTRs are 1+ bilateral lower extremities, and he is as mentioned up and ambulatory without difficulty no weakness appreciated at his hip flexors either.  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  Psychiatric:  Affect normal, Judgment normal, Mood normal.     Results     LAB AND RADIOLOGY:  All pertinent labs reviewed and interpreted  No results found for any visits on 12/27/24.        [facility] Documentation     Medical Decision Making  Obtained supplemental history:Supplemental history obtained?: No  Reviewed external " records: External records reviewed?: Documented in chart and Outpatient Record: noted inc [facility]  Care impacted by chronic illness:Chronic Pain  Did you consider but not order tests?: Work up considered but not performed and documented in chart, if applicable    Consultation discussion with other provider:Did you involve another provider (consultant, MH, pharmacy, etc.)?: No  Discharge. I prescribed additional prescription strength medication(s) as charted. See documentation for any additional details.    MIPS: Not Applicable      Jesica Canchola MD  Emergency Medicine  [facility] EMERGENCY DEPARTMENT         Jesica Canchola MD  12/27/24 2007

## 2025-01-16 ENCOUNTER — APPOINTMENT (OUTPATIENT)
Dept: MRI IMAGING | Facility: HOSPITAL | Age: 80
DRG: 542 | End: 2025-01-16
Attending: EMERGENCY MEDICINE
Payer: COMMERCIAL

## 2025-01-16 ENCOUNTER — HOSPITAL ENCOUNTER (INPATIENT)
Facility: HOSPITAL | Age: 80
DRG: 542 | End: 2025-01-16
Attending: EMERGENCY MEDICINE | Admitting: HOSPITALIST
Payer: COMMERCIAL

## 2025-01-16 ENCOUNTER — APPOINTMENT (OUTPATIENT)
Dept: CT IMAGING | Facility: HOSPITAL | Age: 80
DRG: 542 | End: 2025-01-16
Attending: EMERGENCY MEDICINE
Payer: COMMERCIAL

## 2025-01-16 VITALS
OXYGEN SATURATION: 93 % | BODY MASS INDEX: 26.08 KG/M2 | DIASTOLIC BLOOD PRESSURE: 73 MMHG | RESPIRATION RATE: 18 BRPM | TEMPERATURE: 98.2 F | HEART RATE: 88 BPM | SYSTOLIC BLOOD PRESSURE: 149 MMHG | WEIGHT: 169 LBS

## 2025-01-16 DIAGNOSIS — M84.48XA PATHOLOGICAL FRACTURE OF CERVICAL VERTEBRA, INITIAL ENCOUNTER: Primary | ICD-10-CM

## 2025-01-16 DIAGNOSIS — C61 PROSTATE CANCER (H): ICD-10-CM

## 2025-01-16 LAB
ALBUMIN SERPL BCG-MCNC: 3.9 G/DL (ref 3.5–5.2)
ALBUMIN UR-MCNC: 20 MG/DL
ALP SERPL-CCNC: 1000 U/L (ref 40–150)
ALT SERPL W P-5'-P-CCNC: 21 U/L (ref 0–70)
ANION GAP SERPL CALCULATED.3IONS-SCNC: 12 MMOL/L (ref 7–15)
APPEARANCE UR: CLEAR
AST SERPL W P-5'-P-CCNC: 25 U/L (ref 0–45)
BASOPHILS # BLD AUTO: 0 10E3/UL (ref 0–0.2)
BASOPHILS NFR BLD AUTO: 0 %
BILIRUB SERPL-MCNC: 0.4 MG/DL
BILIRUB UR QL STRIP: NEGATIVE
BUN SERPL-MCNC: 17.3 MG/DL (ref 8–23)
CALCIUM SERPL-MCNC: 9.6 MG/DL (ref 8.8–10.4)
CHLORIDE SERPL-SCNC: 102 MMOL/L (ref 98–107)
COLOR UR AUTO: YELLOW
CREAT SERPL-MCNC: 0.79 MG/DL (ref 0.67–1.17)
EGFRCR SERPLBLD CKD-EPI 2021: >90 ML/MIN/1.73M2
EOSINOPHIL # BLD AUTO: 0.2 10E3/UL (ref 0–0.7)
EOSINOPHIL NFR BLD AUTO: 3 %
ERYTHROCYTE [DISTWIDTH] IN BLOOD BY AUTOMATED COUNT: 12.5 % (ref 10–15)
GLUCOSE SERPL-MCNC: 168 MG/DL (ref 70–99)
GLUCOSE UR STRIP-MCNC: NEGATIVE MG/DL
HCO3 SERPL-SCNC: 23 MMOL/L (ref 22–29)
HCT VFR BLD AUTO: 41 % (ref 40–53)
HGB BLD-MCNC: 13.6 G/DL (ref 13.3–17.7)
HGB UR QL STRIP: NEGATIVE
HYALINE CASTS: 3 /LPF
IMM GRANULOCYTES # BLD: 0 10E3/UL
IMM GRANULOCYTES NFR BLD: 0 %
KETONES UR STRIP-MCNC: NEGATIVE MG/DL
LEUKOCYTE ESTERASE UR QL STRIP: NEGATIVE
LIPASE SERPL-CCNC: 23 U/L (ref 13–60)
LYMPHOCYTES # BLD AUTO: 1.9 10E3/UL (ref 0.8–5.3)
LYMPHOCYTES NFR BLD AUTO: 27 %
MAGNESIUM SERPL-MCNC: 1.9 MG/DL (ref 1.7–2.3)
MCH RBC QN AUTO: 29.2 PG (ref 26.5–33)
MCHC RBC AUTO-ENTMCNC: 33.2 G/DL (ref 31.5–36.5)
MCV RBC AUTO: 88 FL (ref 78–100)
MONOCYTES # BLD AUTO: 0.6 10E3/UL (ref 0–1.3)
MONOCYTES NFR BLD AUTO: 9 %
MUCOUS THREADS #/AREA URNS LPF: PRESENT /LPF
NEUTROPHILS # BLD AUTO: 4.2 10E3/UL (ref 1.6–8.3)
NEUTROPHILS NFR BLD AUTO: 60 %
NITRATE UR QL: NEGATIVE
NRBC # BLD AUTO: 0 10E3/UL
NRBC BLD AUTO-RTO: 0 /100
PH UR STRIP: 6 [PH] (ref 5–7)
PLATELET # BLD AUTO: 250 10E3/UL (ref 150–450)
POTASSIUM SERPL-SCNC: 3.9 MMOL/L (ref 3.4–5.3)
PROT SERPL-MCNC: 7 G/DL (ref 6.4–8.3)
RBC # BLD AUTO: 4.65 10E6/UL (ref 4.4–5.9)
RBC URINE: 1 /HPF
SODIUM SERPL-SCNC: 137 MMOL/L (ref 135–145)
SP GR UR STRIP: 1.03 (ref 1–1.03)
TSH SERPL DL<=0.005 MIU/L-ACNC: 1.68 UIU/ML (ref 0.3–4.2)
UROBILINOGEN UR STRIP-MCNC: <2 MG/DL
WBC # BLD AUTO: 7 10E3/UL (ref 4–11)
WBC URINE: 2 /HPF

## 2025-01-16 PROCEDURE — 84460 ALANINE AMINO (ALT) (SGPT): CPT | Performed by: EMERGENCY MEDICINE

## 2025-01-16 PROCEDURE — 99222 1ST HOSP IP/OBS MODERATE 55: CPT | Performed by: HOSPITALIST

## 2025-01-16 PROCEDURE — 250N000011 HC RX IP 250 OP 636: Performed by: EMERGENCY MEDICINE

## 2025-01-16 PROCEDURE — 255N000002 HC RX 255 OP 636: Performed by: EMERGENCY MEDICINE

## 2025-01-16 PROCEDURE — 71260 CT THORAX DX C+: CPT

## 2025-01-16 PROCEDURE — 999N000104 CT LUMBAR SPINE RECONSTRUCTED

## 2025-01-16 PROCEDURE — 84153 ASSAY OF PSA TOTAL: CPT | Performed by: HOSPITALIST

## 2025-01-16 PROCEDURE — A9585 GADOBUTROL INJECTION: HCPCS | Performed by: EMERGENCY MEDICINE

## 2025-01-16 PROCEDURE — 250N000013 HC RX MED GY IP 250 OP 250 PS 637: Performed by: HOSPITALIST

## 2025-01-16 PROCEDURE — 999N000104 CT THORACIC SPINE RECONSTRUCTED

## 2025-01-16 PROCEDURE — 70553 MRI BRAIN STEM W/O & W/DYE: CPT

## 2025-01-16 PROCEDURE — 84520 ASSAY OF UREA NITROGEN: CPT | Performed by: EMERGENCY MEDICINE

## 2025-01-16 PROCEDURE — 36415 COLL VENOUS BLD VENIPUNCTURE: CPT | Performed by: EMERGENCY MEDICINE

## 2025-01-16 PROCEDURE — 72125 CT NECK SPINE W/O DYE: CPT

## 2025-01-16 PROCEDURE — 120N000001 HC R&B MED SURG/OB

## 2025-01-16 PROCEDURE — 80048 BASIC METABOLIC PNL TOTAL CA: CPT | Performed by: EMERGENCY MEDICINE

## 2025-01-16 PROCEDURE — 99223 1ST HOSP IP/OBS HIGH 75: CPT | Performed by: INTERNAL MEDICINE

## 2025-01-16 PROCEDURE — 72157 MRI CHEST SPINE W/O & W/DYE: CPT

## 2025-01-16 PROCEDURE — 72158 MRI LUMBAR SPINE W/O & W/DYE: CPT

## 2025-01-16 PROCEDURE — 84443 ASSAY THYROID STIM HORMONE: CPT | Performed by: EMERGENCY MEDICINE

## 2025-01-16 PROCEDURE — 83735 ASSAY OF MAGNESIUM: CPT | Performed by: EMERGENCY MEDICINE

## 2025-01-16 PROCEDURE — 85004 AUTOMATED DIFF WBC COUNT: CPT | Performed by: EMERGENCY MEDICINE

## 2025-01-16 PROCEDURE — 81001 URINALYSIS AUTO W/SCOPE: CPT | Performed by: EMERGENCY MEDICINE

## 2025-01-16 PROCEDURE — 85041 AUTOMATED RBC COUNT: CPT | Performed by: EMERGENCY MEDICINE

## 2025-01-16 PROCEDURE — 83690 ASSAY OF LIPASE: CPT | Performed by: EMERGENCY MEDICINE

## 2025-01-16 PROCEDURE — 99221 1ST HOSP IP/OBS SF/LOW 40: CPT | Performed by: PHYSICIAN ASSISTANT

## 2025-01-16 PROCEDURE — 72156 MRI NECK SPINE W/O & W/DYE: CPT

## 2025-01-16 RX ORDER — VITAMIN B COMPLEX
2000 TABLET ORAL DAILY
Status: DISCONTINUED | OUTPATIENT
Start: 2025-01-17 | End: 2025-01-17 | Stop reason: HOSPADM

## 2025-01-16 RX ORDER — HYDROMORPHONE HYDROCHLORIDE 1 MG/ML
0.5 INJECTION, SOLUTION INTRAMUSCULAR; INTRAVENOUS; SUBCUTANEOUS
Status: DISCONTINUED | OUTPATIENT
Start: 2025-01-16 | End: 2025-01-17 | Stop reason: HOSPADM

## 2025-01-16 RX ORDER — ASPIRIN 81 MG/1
81 TABLET ORAL DAILY
Status: DISCONTINUED | OUTPATIENT
Start: 2025-01-17 | End: 2025-01-17 | Stop reason: HOSPADM

## 2025-01-16 RX ORDER — DEXAMETHASONE SODIUM PHOSPHATE 10 MG/ML
10 INJECTION, SOLUTION INTRAMUSCULAR; INTRAVENOUS ONCE
Status: COMPLETED | OUTPATIENT
Start: 2025-01-16 | End: 2025-01-16

## 2025-01-16 RX ORDER — AMOXICILLIN 250 MG
2 CAPSULE ORAL 2 TIMES DAILY
Status: DISCONTINUED | OUTPATIENT
Start: 2025-01-16 | End: 2025-01-17 | Stop reason: HOSPADM

## 2025-01-16 RX ORDER — NALOXONE HYDROCHLORIDE 0.4 MG/ML
0.2 INJECTION, SOLUTION INTRAMUSCULAR; INTRAVENOUS; SUBCUTANEOUS
Status: DISCONTINUED | OUTPATIENT
Start: 2025-01-16 | End: 2025-01-17 | Stop reason: HOSPADM

## 2025-01-16 RX ORDER — OXYCODONE HYDROCHLORIDE 5 MG/1
5 TABLET ORAL EVERY 4 HOURS PRN
Status: DISCONTINUED | OUTPATIENT
Start: 2025-01-16 | End: 2025-01-17 | Stop reason: HOSPADM

## 2025-01-16 RX ORDER — AMOXICILLIN 250 MG
1 CAPSULE ORAL 2 TIMES DAILY
Status: DISCONTINUED | OUTPATIENT
Start: 2025-01-16 | End: 2025-01-17 | Stop reason: HOSPADM

## 2025-01-16 RX ORDER — NALOXONE HYDROCHLORIDE 0.4 MG/ML
0.4 INJECTION, SOLUTION INTRAMUSCULAR; INTRAVENOUS; SUBCUTANEOUS
Status: DISCONTINUED | OUTPATIENT
Start: 2025-01-16 | End: 2025-01-17 | Stop reason: HOSPADM

## 2025-01-16 RX ORDER — LIDOCAINE 40 MG/G
CREAM TOPICAL
Status: DISCONTINUED | OUTPATIENT
Start: 2025-01-16 | End: 2025-01-17 | Stop reason: HOSPADM

## 2025-01-16 RX ORDER — GADOBUTROL 604.72 MG/ML
8 INJECTION INTRAVENOUS ONCE
Status: DISCONTINUED | OUTPATIENT
Start: 2025-01-16 | End: 2025-01-16

## 2025-01-16 RX ORDER — CALCIUM CARBONATE 500 MG/1
1000 TABLET, CHEWABLE ORAL 4 TIMES DAILY PRN
Status: DISCONTINUED | OUTPATIENT
Start: 2025-01-16 | End: 2025-01-17 | Stop reason: HOSPADM

## 2025-01-16 RX ORDER — GADOBUTROL 604.72 MG/ML
8 INJECTION INTRAVENOUS ONCE
Status: COMPLETED | OUTPATIENT
Start: 2025-01-16 | End: 2025-01-16

## 2025-01-16 RX ORDER — TRAMADOL HYDROCHLORIDE 50 MG/1
50 TABLET ORAL EVERY 6 HOURS PRN
Status: DISCONTINUED | OUTPATIENT
Start: 2025-01-16 | End: 2025-01-17 | Stop reason: HOSPADM

## 2025-01-16 RX ORDER — KETOROLAC TROMETHAMINE 15 MG/ML
15 INJECTION, SOLUTION INTRAMUSCULAR; INTRAVENOUS ONCE
Status: COMPLETED | OUTPATIENT
Start: 2025-01-16 | End: 2025-01-16

## 2025-01-16 RX ORDER — IOPAMIDOL 755 MG/ML
83 INJECTION, SOLUTION INTRAVASCULAR ONCE
Status: COMPLETED | OUTPATIENT
Start: 2025-01-16 | End: 2025-01-16

## 2025-01-16 RX ORDER — ACETAMINOPHEN 325 MG/1
650 TABLET ORAL EVERY 4 HOURS PRN
Status: DISCONTINUED | OUTPATIENT
Start: 2025-01-16 | End: 2025-01-17 | Stop reason: HOSPADM

## 2025-01-16 RX ORDER — PROCHLORPERAZINE MALEATE 5 MG/1
5 TABLET ORAL EVERY 6 HOURS PRN
Status: DISCONTINUED | OUTPATIENT
Start: 2025-01-16 | End: 2025-01-17 | Stop reason: HOSPADM

## 2025-01-16 RX ORDER — NITROGLYCERIN 0.4 MG/1
0.4 TABLET SUBLINGUAL EVERY 5 MIN PRN
Status: DISCONTINUED | OUTPATIENT
Start: 2025-01-16 | End: 2025-01-17 | Stop reason: HOSPADM

## 2025-01-16 RX ORDER — SIMVASTATIN 10 MG
20 TABLET ORAL AT BEDTIME
Status: DISCONTINUED | OUTPATIENT
Start: 2025-01-16 | End: 2025-01-17 | Stop reason: HOSPADM

## 2025-01-16 RX ORDER — POLYETHYLENE GLYCOL 3350 17 G/17G
17 POWDER, FOR SOLUTION ORAL 2 TIMES DAILY PRN
Status: DISCONTINUED | OUTPATIENT
Start: 2025-01-16 | End: 2025-01-17 | Stop reason: HOSPADM

## 2025-01-16 RX ADMIN — KETOROLAC TROMETHAMINE 15 MG: 15 INJECTION, SOLUTION INTRAMUSCULAR; INTRAVENOUS at 08:22

## 2025-01-16 RX ADMIN — OXYCODONE HYDROCHLORIDE 5 MG: 5 TABLET ORAL at 13:35

## 2025-01-16 RX ADMIN — IOPAMIDOL 83 ML: 755 INJECTION, SOLUTION INTRAVENOUS at 09:51

## 2025-01-16 RX ADMIN — GADOBUTROL 8 ML: 604.72 INJECTION INTRAVENOUS at 15:44

## 2025-01-16 RX ADMIN — HYDROMORPHONE HYDROCHLORIDE 1 MG: 1 INJECTION, SOLUTION INTRAMUSCULAR; INTRAVENOUS; SUBCUTANEOUS at 08:23

## 2025-01-16 RX ADMIN — SENNOSIDES, DOCUSATE SODIUM 1 TABLET: 50; 8.6 TABLET, FILM COATED ORAL at 21:23

## 2025-01-16 RX ADMIN — SIMVASTATIN 20 MG: 10 TABLET, FILM COATED ORAL at 21:23

## 2025-01-16 RX ADMIN — DEXAMETHASONE SODIUM PHOSPHATE 10 MG: 10 INJECTION, SOLUTION INTRAMUSCULAR; INTRAVENOUS at 08:22

## 2025-01-16 RX ADMIN — HYDROMORPHONE HYDROCHLORIDE 1 MG: 1 INJECTION, SOLUTION INTRAMUSCULAR; INTRAVENOUS; SUBCUTANEOUS at 14:10

## 2025-01-16 RX ADMIN — OXYCODONE HYDROCHLORIDE 5 MG: 5 TABLET ORAL at 21:23

## 2025-01-16 ASSESSMENT — ENCOUNTER SYMPTOMS
FEVER: 0
SHORTNESS OF BREATH: 0
NUMBNESS: 1
FATIGUE: 1
BACK PAIN: 1

## 2025-01-16 ASSESSMENT — ACTIVITIES OF DAILY LIVING (ADL)
ADLS_ACUITY_SCORE: 47
ADLS_ACUITY_SCORE: 50
ADLS_ACUITY_SCORE: 50
ADLS_ACUITY_SCORE: 48
ADLS_ACUITY_SCORE: 50
ADLS_ACUITY_SCORE: 43
ADLS_ACUITY_SCORE: 50
ADLS_ACUITY_SCORE: 47
ADLS_ACUITY_SCORE: 50
ADLS_ACUITY_SCORE: 48
ADLS_ACUITY_SCORE: 50
ADLS_ACUITY_SCORE: 48
ADLS_ACUITY_SCORE: 50
ADLS_ACUITY_SCORE: 50

## 2025-01-16 ASSESSMENT — COLUMBIA-SUICIDE SEVERITY RATING SCALE - C-SSRS
1. IN THE PAST MONTH, HAVE YOU WISHED YOU WERE DEAD OR WISHED YOU COULD GO TO SLEEP AND NOT WAKE UP?: NO
6. HAVE YOU EVER DONE ANYTHING, STARTED TO DO ANYTHING, OR PREPARED TO DO ANYTHING TO END YOUR LIFE?: NO
2. HAVE YOU ACTUALLY HAD ANY THOUGHTS OF KILLING YOURSELF IN THE PAST MONTH?: NO

## 2025-01-16 NOTE — ED NOTES
Patient repositioned in chair, warm blankets per request.  Oxycodone given for pain, MRI called, they are ready for testing.

## 2025-01-16 NOTE — ED PROVIDER NOTES
EMERGENCY DEPARTMENT ENCOUNTER      NAME: Kamille Gleason  AGE: 79 year old male  YOB: 1945  MRN: [id number]  EVALUATION DATE & TIME: 1/16/2025  7:44 AM    PCP: Ford Rodriguez    ED PROVIDER: Robert Wiggins MD      Chief Complaint   Patient presents with    Back Pain         FINAL IMPRESSION:  No diagnosis found.      ED COURSE & MEDICAL DECISION MAKING:    Pertinent Labs & Imaging studies reviewed. (See chart for details)  79 year old male presents to the Emergency Department for evaluation of back pain body aches and unintended weight loss.  Patient has a history of chronic back pain.  Followed by spine surgery followed by physical therapy been evaluated by pain clinic.  Most recently in this emergency department a few weeks ago with institution of Lyrica which patient has reportedly normal impact.  Saw his spine team on an outpatient basis and they trialed the patient on buprenorphine patch however the patient reports that 15 hours after he put the patch on he was lethargic and sedated and felt that he was on way too strong of a dosage and so discontinued it.  His primary concern and what brought him to the emergency department is that he is concerned that there is too much focus on his back as potentially the issue that he is dealing with and that something else is being missed.  More specifically the patient reports that for the past couple of months he has been plummeting in terms of his weight and this is unintended.  He has lost roughly 10 pounds over the last several weeks.  No appetite.  Denies fever denies chills denies chest pain or shortness of breath he is never had this before.  He is not dieting or adjusting his diet in any way.  On examination patient was well-appearing.  Blood pressure noted to be 130/93 vital signs otherwise unremarkable.  He had focal point tenderness to the mid thoracic spine otherwise his spine was nontender.  I could not appreciate an acute neurological  deficit to examination the patient was ambulatory in the room there was no evidence of cerebellar dysfunction I felt that his strength and sensation appear to be normal.  He describes an intermittent numbness to the palms of his hands bilaterally he also has intermittent pain that radiates down both his arms and legs.  No reported bowel or bladder department given the constellation of patient's symptoms with plan for CT scan imaging laboratory testing medication management reassessment.    11:04 AM Consulted Neurosurgery. Spoke with Azar Drake PA-C.     11:32 AM  Fortunately patient CT scan imaging was concerning and consistent with diffuse metastatic disease with multiple sclerotic lesions noted cervical thoracic lumbar spine as well as the pelvis.  Fracture likely present.  Discussed case with neurosurgery with plan for MRI with and without contrast of cervical spine no clear source identified for patient's malignancy on CT chest abdomen pelvis but concerning pattern for possible metastatic cancer prostate.  Overall he is relatively well-appearing with significant improvement to symptoms here in the emergency department and considerations for admission or discharge depending on neurosurgery input they have presented the emergency department and are currently assessing the patient.    11:56 AM  Discussed with neurosurgery posterior evaluation with recommendations for admission to the hospital MRI imaging with and without brain cervical thoracic lumbar and expedited workup in the hospital.  Admitting services paged.  Updated patient and family on concerning imaging results overall plan of care also discussed with daughter via phone.  Admitting paged.       Medical Decision Making  Obtained supplemental history:Supplemental history obtained?: Documented in chart and Family Member/Significant Other  Reviewed external records: External records reviewed?: Documented in chart and Outpatient Record: Ortho note  1/14/25  Care impacted by chronic illness:Diabetes, Heart Disease, and Hyperlipidemia  Did you consider but not order tests?: Work up considered but not performed and documented in chart, if applicable  Did you interpret images independently?: Independent interpretation of ECG and images noted in documentation, when applicable.  Consultation discussion with other provider:Did you involve another provider (consultant, , pharmacy, etc.)?: No  Admit.    MIPS: Not Applicable       At the conclusion of the encounter I discussed the results of all of the tests and the disposition. The questions were answered. The patient or family acknowledged understanding and was agreeable with the care plan.     MEDICATIONS GIVEN IN THE EMERGENCY:  Medications   HYDROmorphone (DILAUDID) injection 1 mg (1 mg Intravenous $Given 1/16/25 0823)   ketorolac (TORADOL) injection 15 mg (15 mg Intravenous $Given 1/16/25 0822)   dexAMETHasone PF (DECADRON) injection 10 mg (10 mg Intravenous $Given 1/16/25 0822)   iopamidol (ISOVUE-370) solution 83 mL (83 mLs Intravenous $Given 1/16/25 0951)       NEW PRESCRIPTIONS STARTED AT TODAY'S ER VISIT  New Prescriptions    No medications on file          =================================================================    HPI    Patient information was obtained from: Patient    Use of : N/A         Kamille Gleason is a 79 year old male with a pertinent history of lumbar disc disease, type 2 diabetes mellitus, CAD, hyperlipidemia, who presents to this ED by walk in for evaluation of back pain. Patient experiencing worsening back pain for 1 month. States since symptoms started he has had about 10 pounds of unintentional weight loss. Endorses chronic low back pain. Over the last few days, back pain has radiated all over back, bilateral arms and legs which is not normal for him. Also notes numbness and tingling to bilateral arms and legs. Denies fever or shortness of breath. Tried lidocaine patch  and physical therapy without improvement. Sometimes prednisone helps relieve symptoms. Reports recently being put on Lyrica and since he has had increased joint pain which is new at this time.     Per wife, patient has had no appetite and increased lethargy for 1 month.     Per chart review, patient seen at [facility] on 1/14/2025 for evaluation of chronic low back pain.       REVIEW OF SYSTEMS   Review of Systems   Constitutional:  Positive for fatigue. Negative for fever.   Respiratory:  Negative for shortness of breath.    Musculoskeletal:  Positive for back pain.   Neurological:  Positive for numbness (and tingling to bilateral extremities).       PAST MEDICAL HISTORY:  Past Medical History:   Diagnosis Date    CAD (coronary artery disease)     Hyperlipidemia        PAST SURGICAL HISTORY:  Past Surgical History:   Procedure Laterality Date    BACK SURGERY      BYPASS GRAFT ARTERY CORONARY  2007    3-vessel    KNEE SURGERY Left            CURRENT MEDICATIONS:    aspirin 81 MG EC tablet  cholecalciferol, vitamin D3, 2,000 unit Tab  nitroglycerin (NITROSTAT) 0.4 MG SL tablet  omeprazole (PRILOSEC) 20 MG capsule  predniSONE (DELTASONE) 20 MG tablet  psyllium (METAMUCIL) 3.4 gram packet  simvastatin (ZOCOR) 20 MG tablet  traMADol (ULTRAM) 50 mg tablet        ALLERGIES:  Allergies   Allergen Reactions    Gabapentin Other (See Comments)     Numbness, paresthesias, fatigue    Atorvastatin Headache       FAMILY HISTORY:  Family History   Problem Relation Age of Onset    Coronary Artery Disease Brother        SOCIAL HISTORY:   Social History     Socioeconomic History    Marital status:    Tobacco Use    Smoking status: Former    Smokeless tobacco: Never    Tobacco comments:     pt quit 10 years ago   Substance and Sexual Activity    Alcohol use: Yes     Comment: 2 times per week most    Drug use: No     Social Drivers of Health     Financial Resource Strain: Low Risk  (9/4/2024)    Financial  Resource Strain     Within the past 12 months, have you or your family members you live with been unable to get utilities (heat, electricity) when it was really needed?: No   Food Insecurity: Low Risk  (9/4/2024)    Food Insecurity     Within the past 12 months, did you worry that your food would run out before you got money to buy more?: No     Within the past 12 months, did the food you bought just not last and you didn t have money to get more?: No   Transportation Needs: Low Risk  (9/4/2024)    Transportation Needs     Within the past 12 months, has lack of transportation kept you from medical appointments, getting your medicines, non-medical meetings or appointments, work, or from getting things that you need?: No    [facility] [facility] [employer] [facility]    Social Connections   Housing Stability: Low Risk  (9/4/2024)    Housing Stability     Do you have housing? : Yes     Are you worried about losing your housing?: No       VITALS:  /73   Pulse 71   Temp 98.2  F (36.8  C)   Resp 18   Wt 76.7 kg (169 lb)   SpO2 96%   BMI 26.08 kg/m      PHYSICAL EXAM    PHYSICAL EXAM    Constitutional: Well developed, Well nourished, NAD  HENT: Normocephalic, Atraumatic, Bilateral external ears normal, Oropharynx normal, mucous membranes moist, Nose normal. Neck-  Normal range of motion, No tenderness, Supple, No stridor.   Eyes: PERRL, EOMI, Conjunctiva normal, No discharge.   Respiratory: Normal breath sounds, No respiratory distress, No wheezing, Speaks full sentences easily. No cough.   Cardiovascular: Normal heart rate, Regular rhythm, No murmurs Chest wall nontender.    GI:  Soft, No tenderness, No masses, No flank tenderness. No rebound or guarding.  : No cva tenderness    Musculoskeletal: 2+ DP pulses. No edema. No cyanosis. Good range of motion in all major joints.  There is tenderness to palpation midline thoracic spine otherwise no  cervical or lumbar tenderness  Integument: Warm, Dry, No erythema, No rash. No petechiae.   Neurologic: Alert & oriented x 3, Normal motor function, Normal sensory function, No focal deficits noted.   Psychiatric: Affect normal, Judgment normal, Mood normal. Cooperative.     LAB:  All pertinent labs reviewed and interpreted.  Results for orders placed or performed during the hospital encounter of 01/16/25   CT Chest/Abdomen/Pelvis w Contrast    Impression    IMPRESSION:  1.  Lymphadenopathy in the chest, abdomen and pelvis which is most likely metastatic.  2.  Sclerotic osseous metastases. While nonspecific, these are frequently seen with prostate cancer. Recommend correlation with PSA.  3.  A few indeterminate pulmonary nodules measuring up to 0.9 cm.    CT Cervical Spine w/o Contrast    Impression    IMPRESSION:  CERVICAL SPINE CT:  1.  Bilateral nondisplaced C7 facet and lamina fractures and irregularly sclerotic C7 vertebral body. Finding could represent a recent pathologic fracture in the setting of sclerotic metastasis. No traumatic subluxation.    THORACIC SPINE CT:  1.  Negative for acute fracture or traumatic subluxation.  2.  Subtle sclerotic foci involving multiple vertebrae. Findings could represent sclerotic metastases.    LUMBAR SPINE CT:  1.  Negative for acute fracture or traumatic subluxation.  2.  Subtle sclerotic lesions involving multiple vertebral bodies, sacrum and iliac bones. Findings could represent sclerotic metastases.      Findings communicated to Dr. Wiggins by me at 1018 hours on 1/16/2025.     CT Thoracic Spine Reconstructed    Impression    IMPRESSION:  CERVICAL SPINE CT:  1.  Bilateral nondisplaced C7 facet and lamina fractures and irregularly sclerotic C7 vertebral body. Finding could represent a recent pathologic fracture in the setting of sclerotic metastasis. No traumatic subluxation.    THORACIC SPINE CT:  1.  Negative for acute fracture or traumatic subluxation.  2.  Subtle  sclerotic foci involving multiple vertebrae. Findings could represent sclerotic metastases.    LUMBAR SPINE CT:  1.  Negative for acute fracture or traumatic subluxation.  2.  Subtle sclerotic lesions involving multiple vertebral bodies, sacrum and iliac bones. Findings could represent sclerotic metastases.      Findings communicated to Dr. Wiggins by me at 1018 hours on 1/16/2025.     CT Lumbar Spine Reconstructed    Impression    IMPRESSION:  CERVICAL SPINE CT:  1.  Bilateral nondisplaced C7 facet and lamina fractures and irregularly sclerotic C7 vertebral body. Finding could represent a recent pathologic fracture in the setting of sclerotic metastasis. No traumatic subluxation.    THORACIC SPINE CT:  1.  Negative for acute fracture or traumatic subluxation.  2.  Subtle sclerotic foci involving multiple vertebrae. Findings could represent sclerotic metastases.    LUMBAR SPINE CT:  1.  Negative for acute fracture or traumatic subluxation.  2.  Subtle sclerotic lesions involving multiple vertebral bodies, sacrum and iliac bones. Findings could represent sclerotic metastases.      Findings communicated to Dr. Wiggins by me at 1018 hours on 1/16/2025.     Comprehensive metabolic panel   Result Value Ref Range    Sodium 137 135 - 145 mmol/L    Potassium 3.9 3.4 - 5.3 mmol/L    Carbon Dioxide (CO2) 23 22 - 29 mmol/L    Anion Gap 12 7 - 15 mmol/L    Urea Nitrogen 17.3 8.0 - 23.0 mg/dL    Creatinine 0.79 0.67 - 1.17 mg/dL    GFR Estimate >90 >60 mL/min/1.73m2    Calcium 9.6 8.8 - 10.4 mg/dL    Chloride 102 98 - 107 mmol/L    Glucose 168 (H) 70 - 99 mg/dL    Alkaline Phosphatase 1,000 (H) 40 - 150 U/L    AST 25 0 - 45 U/L    ALT 21 0 - 70 U/L    Protein Total 7.0 6.4 - 8.3 g/dL    Albumin 3.9 3.5 - 5.2 g/dL    Bilirubin Total 0.4 <=1.2 mg/dL   Result Value Ref Range    Lipase 23 13 - 60 U/L   Result Value Ref Range    Magnesium 1.9 1.7 - 2.3 mg/dL   UA with Microscopic reflex to Culture    Specimen: Urine, Midstream    Result Value Ref Range    Color Urine Yellow Colorless, Straw, Light Yellow, Yellow    Appearance Urine Clear Clear    Glucose Urine Negative Negative mg/dL    Bilirubin Urine Negative Negative    Ketones Urine Negative Negative mg/dL    Specific Gravity Urine 1.027 1.001 - 1.030    Blood Urine Negative Negative    pH Urine 6.0 5.0 - 7.0    Protein Albumin Urine 20 (A) Negative mg/dL    Urobilinogen Urine <2.0 <2.0 mg/dL    Nitrite Urine Negative Negative    Leukocyte Esterase Urine Negative Negative    Mucus Urine Present (A) None Seen /LPF    RBC Urine 1 <=2 /HPF    WBC Urine 2 <=5 /HPF    Hyaline Casts Urine 3 (H) <=2 /LPF   TSH with free T4 reflex   Result Value Ref Range    TSH 1.68 0.30 - 4.20 uIU/mL   CBC with platelets and differential   Result Value Ref Range    WBC Count 7.0 4.0 - 11.0 10e3/uL    RBC Count 4.65 4.40 - 5.90 10e6/uL    Hemoglobin 13.6 13.3 - 17.7 g/dL    Hematocrit 41.0 40.0 - 53.0 %    MCV 88 78 - 100 fL    MCH 29.2 26.5 - 33.0 pg    MCHC 33.2 31.5 - 36.5 g/dL    RDW 12.5 10.0 - 15.0 %    Platelet Count 250 150 - 450 10e3/uL    % Neutrophils 60 %    % Lymphocytes 27 %    % Monocytes 9 %    % Eosinophils 3 %    % Basophils 0 %    % Immature Granulocytes 0 %    NRBCs per 100 WBC 0 <1 /100    Absolute Neutrophils 4.2 1.6 - 8.3 10e3/uL    Absolute Lymphocytes 1.9 0.8 - 5.3 10e3/uL    Absolute Monocytes 0.6 0.0 - 1.3 10e3/uL    Absolute Eosinophils 0.2 0.0 - 0.7 10e3/uL    Absolute Basophils 0.0 0.0 - 0.2 10e3/uL    Absolute Immature Granulocytes 0.0 <=0.4 10e3/uL    Absolute NRBCs 0.0 10e3/uL       RADIOLOGY:  Reviewed all pertinent imaging. Please see official radiology report.  CT Lumbar Spine Reconstructed   Final Result   IMPRESSION:   CERVICAL SPINE CT:   1.  Bilateral nondisplaced C7 facet and lamina fractures and irregularly sclerotic C7 vertebral body. Finding could represent a recent pathologic fracture in the setting of sclerotic metastasis. No traumatic subluxation.      THORACIC  SPINE CT:   1.  Negative for acute fracture or traumatic subluxation.   2.  Subtle sclerotic foci involving multiple vertebrae. Findings could represent sclerotic metastases.      LUMBAR SPINE CT:   1.  Negative for acute fracture or traumatic subluxation.   2.  Subtle sclerotic lesions involving multiple vertebral bodies, sacrum and iliac bones. Findings could represent sclerotic metastases.         Findings communicated to Dr. Wiggins by me at 1018 hours on 1/16/2025.         CT Thoracic Spine Reconstructed   Final Result   IMPRESSION:   CERVICAL SPINE CT:   1.  Bilateral nondisplaced C7 facet and lamina fractures and irregularly sclerotic C7 vertebral body. Finding could represent a recent pathologic fracture in the setting of sclerotic metastasis. No traumatic subluxation.      THORACIC SPINE CT:   1.  Negative for acute fracture or traumatic subluxation.   2.  Subtle sclerotic foci involving multiple vertebrae. Findings could represent sclerotic metastases.      LUMBAR SPINE CT:   1.  Negative for acute fracture or traumatic subluxation.   2.  Subtle sclerotic lesions involving multiple vertebral bodies, sacrum and iliac bones. Findings could represent sclerotic metastases.         Findings communicated to Dr. Wiggins by me at 1018 hours on 1/16/2025.         CT Chest/Abdomen/Pelvis w Contrast   Final Result   IMPRESSION:   1.  Lymphadenopathy in the chest, abdomen and pelvis which is most likely metastatic.   2.  Sclerotic osseous metastases. While nonspecific, these are frequently seen with prostate cancer. Recommend correlation with PSA.   3.  A few indeterminate pulmonary nodules measuring up to 0.9 cm.       CT Cervical Spine w/o Contrast   Final Result   IMPRESSION:   CERVICAL SPINE CT:   1.  Bilateral nondisplaced C7 facet and lamina fractures and irregularly sclerotic C7 vertebral body. Finding could represent a recent pathologic fracture in the setting of sclerotic metastasis. No traumatic  subluxation.      THORACIC SPINE CT:   1.  Negative for acute fracture or traumatic subluxation.   2.  Subtle sclerotic foci involving multiple vertebrae. Findings could represent sclerotic metastases.      LUMBAR SPINE CT:   1.  Negative for acute fracture or traumatic subluxation.   2.  Subtle sclerotic lesions involving multiple vertebral bodies, sacrum and iliac bones. Findings could represent sclerotic metastases.         Findings communicated to Dr. Wiggins by me at 1018 hours on 1/16/2025.         MR Cervical Spine w/o & w Contrast    (Results Pending)         I, Sussy Sweet, am serving as a scribe to document services personally performed by Robert Wiggins MD based on my observation and the provider's statements to me. I, Robert Wiggins MD, attest that Sussy Sweet is acting in a scribe capacity, has observed my performance of the services and has documented them in accordance with my direction.    Robert Wiggins MD  [facility] EMERGENCY DEPARTMENT  [address] [place] [id number]  [phone]       Robert Wiggins MD  01/16/25 3281

## 2025-01-16 NOTE — ED TRIAGE NOTES
Patient arrives by private car for evaluation of back pain since 2006.  States he has had surgery, been put on many different medications and has gone to 1 session of PT.  States pain is now radiating down his arms and legs.

## 2025-01-16 NOTE — ED NOTES
"[facility] ED Handoff Report    ED Chief Complaint: Back pain    ED Diagnosis:  (M84.48XA) Pathological fracture of cervical vertebra, initial encounter  Comment:   Plan:        PMH:    Past Medical History:   Diagnosis Date    CAD (coronary artery disease)     Hyperlipidemia         Code Status:  Full Code     Falls Risk: No Band: Not applicable    Current Living Situation/Residence: lives with a significant other     Elimination Status: Continent: Yes     Activity Level: Independent    Patients Preferred Language:  English     Needed: No    Vital Signs:  /73   Pulse 85   Temp 98.2  F (36.8  C)   Resp 18   Wt 76.7 kg (169 lb)   SpO2 92%   BMI 26.08 kg/m       Cardiac Rhythm: NA    Pain Score: 3/10    Is the Patient Confused:  No    Last Food or Drink: 1/15/25 at     Focused Assessment:  Patient with chronic lumbar pain, increasing pain over the last few weeks, has seen PMD, went to PT and had medications changed. Pain continued. \"It feels different, I think something else is going on\" with in the last few days does not have strength in bilateral upper extremities, came to ER.    CT done which showed a C-7 pathological fracture secondary to metastasis. Multiple sites on spine and abdomin. Concern primary may be prostrate. Milan collar placed by MD. Analgesic given with good results.     Patient was given oxycodone at 1330 and went to MRI. Call from MRI patient can not lie flat on table, pain numbness in upper back. Swat went to MRI with Dilaudid and monitoring. MRI will take 2 hours.    Patient has been up  to bathroom independently.     Tests Performed: Done: Labs and Imaging    Treatments Provided:      Family Dynamics/Concerns: No    Family Updated On Visitor Policy: Yes    Plan of Care Communicated to Family: Yes    Who Was Updated about Plan of Care: wife at bedside    Belongings Checklist Done and Signed by Patient: Yes    Belongings Sent with Patient: with wife    Medications " sent with patient:     Covid: asymptomatic , not tested    Additional Information:     RN: Alexandrea Holder RN   1/16/2025 2:25 PM

## 2025-01-16 NOTE — CONSULTS
Neurosurgery Consult    HPI    Mr. Gleason is a 79-year-old male who presented to the emergency department for neck pain, generalized bodyaches, unintended weight loss, over the past month.    He reports intermittent paresthesias in his upper extremities, reported numbness in his palms yesterday.  Currently not having paresthesias.    He was placed in a cervical collar by the emergency department after cervical CT scan demonstrated lamina and facet fractures at C7, with osseous changes in the vertebral body concerning for metastasis.    Patient was also found to have diffuse sclerotic metastasis throughout the cervical thoracic and lumbar spine and into the pelvis.      Patient denies history of cancer.    Medical history  lumbar disc disease, type 2 diabetes mellitus, CAD, hyperlipidemia    Social history  Retired, used to work for [employer]      B/P: 124/73, T: 98.2, P: 71, R: 18       Exam    Alert and oriented no acute distress  Bilateral upper extremities with 5/5 strength, with the exception of 5 -/5 strength in the triceps bilaterally  Richelle sign negative  Reflexes absent triceps, biceps, brachioradialis    Bilateral lower extremities with 5/5 strength  Reflexes absent patella/ankle  Negative straight leg raise bilaterally  Negative ankle clonus negative Babinski bilaterally  Sensation intact in bilateral upper and lower extremities to soft touch    Imaging    IMPRESSION:  CERVICAL SPINE CT:  1.  Bilateral nondisplaced C7 facet and lamina fractures and irregularly sclerotic C7 vertebral body. Finding could represent a recent pathologic fracture in the setting of sclerotic metastasis. No traumatic subluxation.     THORACIC SPINE CT:  1.  Negative for acute fracture or traumatic subluxation.  2.  Subtle sclerotic foci involving multiple vertebrae. Findings could represent sclerotic metastases.     LUMBAR SPINE CT:  1.  Negative for acute fracture or traumatic subluxation.  2.   Subtle sclerotic lesions involving multiple vertebral bodies, sacrum and iliac bones. Findings could represent sclerotic metastases.    Assessment    Neck pain  Generalized weakness  C7 vertebral body pathologic fracture, with C7 facet and lamina fractures bilaterally nondisplaced    Concern for neoplasm    Plan:      Recommend admission for oncological workup, evaluation for multiple myeloma, metastasis, possible biopsy pending further imaging.    No urgent neurosurgical intervention recommended at this time, we will make further recommendations following imaging.    Recommend imaging the patient's brain, cervical, thoracic, lumbar spine with and without contrast on MRI    Reviewed with Dr. Hobbs    Patient should remain n.p.o. until cervical imaging completed

## 2025-01-16 NOTE — PHARMACY-ADMISSION MEDICATION HISTORY
Pharmacist Admission Medication History    Admission medication history is complete. The information provided in this note is only as accurate as the sources available at the time of the update.    Information Source(s): Patient, Clinic records, Hospital records, and CareEverywhere/SureScripts via in-person    Pertinent Information: he tried Lyrica and Butrans patch but discontinued due to side effects.     Changes made to PTA medication list:  Added: None  Deleted: prednisone   Changed: None    Allergies reviewed with patient and updates made in EHR: yes    Medication History Completed By: Robinson Kennedy [facility] 1/16/2025 12:36 PM    PTA Med List   Medication Sig Last Dose/Taking    aspirin 81 MG EC tablet Take 1 tablet (81 mg) by mouth daily. 1/15/2025 Morning    cholecalciferol, vitamin D3, 2,000 unit Tab [CHOLECALCIFEROL, VITAMIN D3, 2,000 UNIT TAB] Take 2,000 Units by mouth daily. 1/15/2025 Morning    nitroglycerin (NITROSTAT) 0.4 MG SL tablet [NITROGLYCERIN (NITROSTAT) 0.4 MG SL TABLET] Place 0.4 mg under the tongue every 5 (five) minutes as needed. Taking As Needed    omeprazole (PRILOSEC) 20 MG capsule Take 20 mg by mouth daily as needed. Taking As Needed    psyllium (METAMUCIL) 3.4 gram packet [PSYLLIUM (METAMUCIL) 3.4 GRAM PACKET] Take 1 packet by mouth daily as needed.  Taking As Needed    simvastatin (ZOCOR) 20 MG tablet Take 20 mg by mouth at bedtime. 1/15/2025 Bedtime    traMADol (ULTRAM) 50 mg tablet Take 50 mg by mouth 2 times daily as needed. 1/16/2025 Morning

## 2025-01-16 NOTE — ED NOTES
Call from MRI, patient unable to lie flat, develops pain numbness in upper back. Order for dilaudid, swat nurse to monitor in MRI after med given

## 2025-01-16 NOTE — H&P
"[facility]    History and Physical - Hospitalist Service       Date of Admission:  1/16/2025    Assessment & Plan    Patient is a 79-year-old male who presents with new upper back pain with radiculopathy, weight loss and found to have C7 pathologic fracture.    #C7 pathologic fracture  #Osseous metastases  #Pulmonary nodule  CT c/a/p w/ contrast: Lymphadenopathy in the chest, abdomen and pelvis.  Sclerotic osseous metastases.  Few indeterminate pulmonary nodules measuring up to 0.9 cm.  Neurosurgery consulted- no urgent surgery, placed Aspen collar, obtaining MRIs w&w/o contrast brain, c/t/l spine  s/p 10mg IV dexamethasone, 1mg IV dilaudid, 15mg IV toradol  Pain control with tylenol, tramadol and oxycodone prn  Bowel regimen  Check SPEP/IPEP, kappa/lambda light chains, immunoglobulins  Follow MRI results  Consult Oncology     #Enlarged prostate  Difficulty starting urine x1 month  Check PSA      DVT ppx: PCDs        Diet: Combination Diet Regular Diet Adult    DVT Prophylaxis: Pneumatic Compression Devices  Salazar Catheter: Not present  Lines: None     Cardiac Monitoring: ACTIVE order. Indication: Tachyarrhythmias, acute (48 hours)  Code Status: Full Code      Clinically Significant Risk Factors Present on Admission                 # Drug Induced Platelet Defect: home medication list includes an antiplatelet medication             # Overweight: Estimated body mass index is 26.08 kg/m  as calculated from the following:    Height as of 12/27/24: 1.715 m (5' 7.5\").    Weight as of this encounter: 76.7 kg (169 lb).        # History of CABG: noted on surgical history       Disposition Plan     Medically Ready for Discharge: Anticipated in 2-4 Days           Charles Contreras DO  Hospitalist Service  [facility]  Securely message with [employer] (more info)  Text page via [facility] Paging/Directory     ______________________________________________________________________    Chief " Complaint   New upper back pain with radicular symptoms     History is obtained from the patient, electronic health record, emergency department physician, and patient's spouse    History of Present Illness   Patient is a very pleasant 79-year-old male with history of chronic low back pain who presents with new upper back pain with radiculopathy and weight loss.  Patient reports pain in the upper back with numbness and tingling down both arms along with joint pain in the elbows and wrists.  Also notes numbness and tingling in the hands but says this has been present for several years.  Has been following with neurosurgery at [facility] and had low back surgeries in the past.  They have put him on a couple rounds of prednisone which did seem to help marginally.  He tried gabapentin but did not tolerate due to altered mental status.  He tried Lyrica and a pain patch but felt that the joint pain became worse with these medications.  He tried ibuprofen for a few days but developed a nosebleed. Currently takes low-dose tramadol with marginal relief.  Patient has noticed a decrease in appetite and a loss of 10 pounds since December 18.  He has had difficulty starting a stream of urine for the past month.  Patient stopped smoking in 2010. Drinks alcohol rarely.  No family history of cancer in his mother, father or 11 brothers and sisters.    Past Medical History    Past Medical History:   Diagnosis Date    CAD (coronary artery disease)     Hyperlipidemia        Past Surgical History   Past Surgical History:   Procedure Laterality Date    BACK SURGERY      BYPASS GRAFT ARTERY CORONARY  2007    3-vessel    KNEE SURGERY Left        Prior to Admission Medications   Prior to Admission Medications   Prescriptions Last Dose Informant Patient Reported? Taking?   aspirin 81 MG EC tablet 1/15/2025 Morning  No Yes   Sig: Take 1 tablet (81 mg) by mouth daily.   cholecalciferol, vitamin D3, 2,000 unit Tab 1/15/2025 Morning  Yes Yes    Sig: [CHOLECALCIFEROL, VITAMIN D3, 2,000 UNIT TAB] Take 2,000 Units by mouth daily.   nitroglycerin (NITROSTAT) 0.4 MG SL tablet   Yes Yes   Sig: [NITROGLYCERIN (NITROSTAT) 0.4 MG SL TABLET] Place 0.4 mg under the tongue every 5 (five) minutes as needed.   omeprazole (PRILOSEC) 20 MG capsule   Yes Yes   Sig: Take 20 mg by mouth daily as needed.   psyllium (METAMUCIL) 3.4 gram packet   Yes Yes   Sig: [PSYLLIUM (METAMUCIL) 3.4 GRAM PACKET] Take 1 packet by mouth daily as needed.    simvastatin (ZOCOR) 20 MG tablet 1/15/2025 Bedtime  Yes Yes   Sig: Take 20 mg by mouth at bedtime.   traMADol (ULTRAM) 50 mg tablet 1/16/2025 Morning  Yes Yes   Sig: Take 50 mg by mouth 2 times daily as needed.      Facility-Administered Medications: None           Physical Exam   Vital Signs: Temp: 98.2  F (36.8  C)   BP: 124/73 Pulse: 71   Resp: 18 SpO2: 96 % O2 Device: None (Room air)    Weight: 169 lbs 0 oz    General Appearance:  No acute distress  Aspen c-collar in place  Respiratory: Clear to auscultation bilaterally  Cardiovascular: Irregular rhythm, normal rate, no murmur appreciated  GI: Normal bowel sounds, abdomen is soft with no tenderness or rebound  Extremities: No peripheral edema or cyanosis  Neuro: Alert and oriented x 3, normal speech      Medical Decision Making             Data

## 2025-01-16 NOTE — ED NOTES
"MRI called, additional films were added to original MRI, they will not be able to do scan until evening \"it is going to take about 2 hours to complete scan\" patient ans MD updated. Patient states pain at 1/10  "

## 2025-01-17 ENCOUNTER — APPOINTMENT (OUTPATIENT)
Dept: RADIOLOGY | Facility: HOSPITAL | Age: 80
DRG: 542 | End: 2025-01-17
Payer: COMMERCIAL

## 2025-01-17 VITALS
HEART RATE: 69 BPM | HEIGHT: 68 IN | BODY MASS INDEX: 25.07 KG/M2 | TEMPERATURE: 97.8 F | WEIGHT: 165.4 LBS | SYSTOLIC BLOOD PRESSURE: 126 MMHG | RESPIRATION RATE: 18 BRPM | OXYGEN SATURATION: 97 % | DIASTOLIC BLOOD PRESSURE: 64 MMHG

## 2025-01-17 LAB
ALBUMIN SERPL BCG-MCNC: 3.9 G/DL (ref 3.5–5.2)
ALP SERPL-CCNC: 984 U/L (ref 40–150)
ALT SERPL W P-5'-P-CCNC: 21 U/L (ref 0–70)
ANION GAP SERPL CALCULATED.3IONS-SCNC: 10 MMOL/L (ref 7–15)
AST SERPL W P-5'-P-CCNC: 24 U/L (ref 0–45)
BASOPHILS # BLD AUTO: 0 10E3/UL (ref 0–0.2)
BASOPHILS NFR BLD AUTO: 0 %
BILIRUB SERPL-MCNC: 0.4 MG/DL
BUN SERPL-MCNC: 18.6 MG/DL (ref 8–23)
CALCIUM SERPL-MCNC: 9.7 MG/DL (ref 8.8–10.4)
CHLORIDE SERPL-SCNC: 100 MMOL/L (ref 98–107)
CREAT SERPL-MCNC: 0.73 MG/DL (ref 0.67–1.17)
EGFRCR SERPLBLD CKD-EPI 2021: >90 ML/MIN/1.73M2
EOSINOPHIL # BLD AUTO: 0 10E3/UL (ref 0–0.7)
EOSINOPHIL NFR BLD AUTO: 1 %
ERYTHROCYTE [DISTWIDTH] IN BLOOD BY AUTOMATED COUNT: 12.4 % (ref 10–15)
GLUCOSE SERPL-MCNC: 139 MG/DL (ref 70–99)
HCO3 SERPL-SCNC: 25 MMOL/L (ref 22–29)
HCT VFR BLD AUTO: 39.6 % (ref 40–53)
HGB BLD-MCNC: 13.3 G/DL (ref 13.3–17.7)
IGA SERPL-MCNC: 138 MG/DL (ref 84–499)
IGG SERPL-MCNC: 1043 MG/DL (ref 610–1616)
IGM SERPL-MCNC: 53 MG/DL (ref 35–242)
IMM GRANULOCYTES # BLD: 0 10E3/UL
IMM GRANULOCYTES NFR BLD: 0 %
KAPPA LC FREE SER-MCNC: 1.4 MG/DL (ref 0.33–1.94)
KAPPA LC FREE/LAMBDA FREE SER NEPH: 1.24 {RATIO} (ref 0.26–1.65)
LAMBDA LC FREE SERPL-MCNC: 1.13 MG/DL (ref 0.57–2.63)
LYMPHOCYTES # BLD AUTO: 2.1 10E3/UL (ref 0.8–5.3)
LYMPHOCYTES NFR BLD AUTO: 26 %
MCH RBC QN AUTO: 29.4 PG (ref 26.5–33)
MCHC RBC AUTO-ENTMCNC: 33.6 G/DL (ref 31.5–36.5)
MCV RBC AUTO: 88 FL (ref 78–100)
MONOCYTES # BLD AUTO: 0.7 10E3/UL (ref 0–1.3)
MONOCYTES NFR BLD AUTO: 9 %
NEUTROPHILS # BLD AUTO: 5.1 10E3/UL (ref 1.6–8.3)
NEUTROPHILS NFR BLD AUTO: 64 %
NRBC # BLD AUTO: 0 10E3/UL
NRBC BLD AUTO-RTO: 0 /100
PLATELET # BLD AUTO: 322 10E3/UL (ref 150–450)
POTASSIUM SERPL-SCNC: 4.2 MMOL/L (ref 3.4–5.3)
PROT SERPL-MCNC: 7.2 G/DL (ref 6.4–8.3)
PSA FREE MFR SERPL: ABNORMAL %
PSA FREE SERPL-MCNC: >50 NG/ML
PSA SERPL DL<=0.01 NG/ML-MCNC: 517 NG/ML (ref 0–6.5)
PSA SERPL DL<=0.01 NG/ML-MCNC: 544 NG/ML (ref 0–6.5)
RBC # BLD AUTO: 4.52 10E6/UL (ref 4.4–5.9)
SODIUM SERPL-SCNC: 135 MMOL/L (ref 135–145)
TOTAL PROTEIN SERUM FOR ELP: 6.5 G/DL (ref 6.4–8.3)
WBC # BLD AUTO: 8.1 10E3/UL (ref 4–11)

## 2025-01-17 PROCEDURE — 86334 IMMUNOFIX E-PHORESIS SERUM: CPT | Performed by: PATHOLOGY

## 2025-01-17 PROCEDURE — 82784 ASSAY IGA/IGD/IGG/IGM EACH: CPT | Performed by: HOSPITALIST

## 2025-01-17 PROCEDURE — 85025 COMPLETE CBC W/AUTO DIFF WBC: CPT | Performed by: HOSPITALIST

## 2025-01-17 PROCEDURE — 72040 X-RAY EXAM NECK SPINE 2-3 VW: CPT

## 2025-01-17 PROCEDURE — 84165 PROTEIN E-PHORESIS SERUM: CPT | Mod: TC | Performed by: PATHOLOGY

## 2025-01-17 PROCEDURE — 36415 COLL VENOUS BLD VENIPUNCTURE: CPT | Performed by: HOSPITALIST

## 2025-01-17 PROCEDURE — 250N000013 HC RX MED GY IP 250 OP 250 PS 637: Performed by: HOSPITALIST

## 2025-01-17 PROCEDURE — 83521 IG LIGHT CHAINS FREE EACH: CPT | Performed by: HOSPITALIST

## 2025-01-17 PROCEDURE — 82310 ASSAY OF CALCIUM: CPT | Performed by: HOSPITALIST

## 2025-01-17 PROCEDURE — 250N000011 HC RX IP 250 OP 636: Performed by: HOSPITALIST

## 2025-01-17 PROCEDURE — 84155 ASSAY OF PROTEIN SERUM: CPT | Performed by: HOSPITALIST

## 2025-01-17 PROCEDURE — 99239 HOSP IP/OBS DSCHRG MGMT >30: CPT | Performed by: HOSPITALIST

## 2025-01-17 RX ORDER — DOCUSATE SODIUM 100 MG/1
100 CAPSULE, LIQUID FILLED ORAL 2 TIMES DAILY
Qty: 60 CAPSULE | Refills: 3 | Status: SHIPPED | OUTPATIENT
Start: 2025-01-17

## 2025-01-17 RX ORDER — POLYETHYLENE GLYCOL 3350 17 G/17G
17 POWDER, FOR SOLUTION ORAL DAILY
Status: DISCONTINUED | OUTPATIENT
Start: 2025-01-17 | End: 2025-01-17

## 2025-01-17 RX ORDER — OXYCODONE HYDROCHLORIDE 5 MG/1
5-10 TABLET ORAL EVERY 4 HOURS PRN
Qty: 40 TABLET | Refills: 0 | Status: SHIPPED | OUTPATIENT
Start: 2025-01-17

## 2025-01-17 RX ORDER — BISACODYL 10 MG
10 SUPPOSITORY, RECTAL RECTAL DAILY PRN
Qty: 20 SUPPOSITORY | Refills: 3 | Status: SHIPPED | OUTPATIENT
Start: 2025-01-17

## 2025-01-17 RX ORDER — ACETAMINOPHEN 325 MG/1
650 TABLET ORAL EVERY 4 HOURS PRN
COMMUNITY
Start: 2025-01-17

## 2025-01-17 RX ORDER — TRAMADOL HYDROCHLORIDE 50 MG/1
50 TABLET ORAL EVERY 6 HOURS PRN
Status: SHIPPED
Start: 2025-01-17

## 2025-01-17 RX ORDER — BISACODYL 10 MG
10 SUPPOSITORY, RECTAL RECTAL DAILY PRN
Status: DISCONTINUED | OUTPATIENT
Start: 2025-01-17 | End: 2025-01-17 | Stop reason: HOSPADM

## 2025-01-17 RX ORDER — MULTIPLE VITAMINS W/ MINERALS TAB 9MG-400MCG
1 TAB ORAL DAILY
Status: DISCONTINUED | OUTPATIENT
Start: 2025-01-17 | End: 2025-01-17 | Stop reason: HOSPADM

## 2025-01-17 RX ORDER — AMOXICILLIN 250 MG
2 CAPSULE ORAL 2 TIMES DAILY
Qty: 120 TABLET | Refills: 3 | Status: SHIPPED | OUTPATIENT
Start: 2025-01-17

## 2025-01-17 RX ORDER — BISACODYL 10 MG
10 SUPPOSITORY, RECTAL RECTAL ONCE
Status: COMPLETED | OUTPATIENT
Start: 2025-01-17 | End: 2025-01-17

## 2025-01-17 RX ORDER — POLYETHYLENE GLYCOL 3350 17 G/17G
17 POWDER, FOR SOLUTION ORAL DAILY
COMMUNITY
Start: 2025-01-17

## 2025-01-17 RX ORDER — LANOLIN ALCOHOL/MO/W.PET/CERES
100 CREAM (GRAM) TOPICAL DAILY
Qty: 30 TABLET | Refills: 3 | Status: SHIPPED | OUTPATIENT
Start: 2025-01-18

## 2025-01-17 RX ADMIN — OXYCODONE HYDROCHLORIDE 5 MG: 5 TABLET ORAL at 10:24

## 2025-01-17 RX ADMIN — TRAMADOL HYDROCHLORIDE 50 MG: 50 TABLET, COATED ORAL at 13:38

## 2025-01-17 RX ADMIN — SENNOSIDES, DOCUSATE SODIUM 2 TABLET: 50; 8.6 TABLET, FILM COATED ORAL at 08:10

## 2025-01-17 RX ADMIN — OXYCODONE HYDROCHLORIDE 5 MG: 5 TABLET ORAL at 16:56

## 2025-01-17 RX ADMIN — HYDROMORPHONE HYDROCHLORIDE 0.5 MG: 1 INJECTION, SOLUTION INTRAMUSCULAR; INTRAVENOUS; SUBCUTANEOUS at 03:24

## 2025-01-17 RX ADMIN — HYDROMORPHONE HYDROCHLORIDE 0.5 MG: 1 INJECTION, SOLUTION INTRAMUSCULAR; INTRAVENOUS; SUBCUTANEOUS at 11:44

## 2025-01-17 RX ADMIN — HYDROMORPHONE HYDROCHLORIDE 0.5 MG: 1 INJECTION, SOLUTION INTRAMUSCULAR; INTRAVENOUS; SUBCUTANEOUS at 14:46

## 2025-01-17 RX ADMIN — POLYETHYLENE GLYCOL 3350 17 G: 17 POWDER, FOR SOLUTION ORAL at 08:56

## 2025-01-17 RX ADMIN — THIAMINE HCL TAB 100 MG 100 MG: 100 TAB at 14:52

## 2025-01-17 RX ADMIN — Medication 1 TABLET: at 14:52

## 2025-01-17 RX ADMIN — ASPIRIN 81 MG: 81 TABLET, COATED ORAL at 08:11

## 2025-01-17 RX ADMIN — Medication 2000 UNITS: at 08:11

## 2025-01-17 RX ADMIN — BISACODYL 10 MG: 10 SUPPOSITORY RECTAL at 11:46

## 2025-01-17 ASSESSMENT — ACTIVITIES OF DAILY LIVING (ADL)
ADLS_ACUITY_SCORE: 48
ADLS_ACUITY_SCORE: 48
ADLS_ACUITY_SCORE: 53
ADLS_ACUITY_SCORE: 48
ADLS_ACUITY_SCORE: 48
ADLS_ACUITY_SCORE: 53
ADLS_ACUITY_SCORE: 48
ADLS_ACUITY_SCORE: 53
ADLS_ACUITY_SCORE: 53
ADLS_ACUITY_SCORE: 48

## 2025-01-17 NOTE — PROGRESS NOTES
"CLINICAL NUTRITION SERVICES - ASSESSMENT NOTE    RECOMMENDATIONS FOR MDs/PROVIDERS TO ORDER:  Mvi with minerals and thiamine 100 mg daily x 10 days d/t not meeting RDIs x 1 month, risk of refeeding, malnutrition    Malnutrition Status:    Malnutrition Diagnosis: Severe malnutrition in the context of acute illness or injury  Malnutrition Present on Admission: Yes    Registered Dietitian Interventions:  Ensure enlive bid = 700 kcal, 40 g protein total    Future/Additional Recommendations:  Adjust supplements pending intake, weight, tolerance, acceptance, labs,        REASON FOR ASSESSMENT  Positive admission nutrition risk screen with 2-13 lb weight loss and eating poorly d/t decreased appetite    Pt with cervical fx and metastatic prostate CA  Hx DM2, CAD with CABG, HLD    SUBJECTIVE INFORMATION  Assessed patient in room. Wife at bedside. Pt reports his appetite is improved but still less than baseline. He is has had protein shakes in the past and likes them. He is interested in taking them here.     NUTRITION HISTORY    Pt with increasing pain, poor appetite and weight loss x 1 month    Pt reports intake 50% of baseline x 1 months d/t pain. Has had weight loss and  lost muscle rapidly per pt and wife.     Pt denies difficulty eating with cervical collar on.   No food allergies    CURRENT NUTRITION ORDERS  Diet: Orders Placed This Encounter      Combination Diet Regular Diet Adult      CURRENT INTAKE/TOLERANCE  Ate 100% of supper last night at 691 kcal, 28 g protein  Breakfast ordered today and ordering late lunch after being NPO for test    LABS  Nutrition-relevant labs:   Alk phos 984 (H)    MEDICATIONS  Nutrition-relevant medications: pericolace bid, zocor, vit D. Dulcolax suppository today    ANTHROPOMETRICS  Height: 171.5 cm (5' 7.52\")  IBW: 67.2 kg  % IBW: 114%  BMI (kg/m ): Overweight BMI 25-29.9  Weight History: 6.7% weight loss x 1.5 weeks  Date/Time Weight Weight Method   01/17/25 1203 75 kg (165 lb 6.4 oz) " Standing scale   01/16/25 0738 76.7 kg (169 lb) --     Wt Readings from Last 10 Encounters:   01/16/25 01/06/25 76.7 kg (169 lb)  80.3 kg (177 lb) -office   12/27/24 12/18/24 80.2 kg (176 lb 11.2 oz)  80.3 kg (177 lb) -office   09/04/24 79.8 kg (175 lb 14.8 oz)   06/01/23 78.5 kg (173 lb)   04/09/23 82.1 kg (181 lb 1.6 oz)   07/20/21 80.3 kg (177 lb)   07/12/21 80.7 kg (178 lb)       Dosing Weight: 76.7 kg, based on actual wt    ASSESSED NUTRITION NEEDS  Estimated Energy Needs: 6224-4728 kcals/day (30 - 35 kcals/kg)  Justification: Increased needs  Estimated Protein Needs:  grams protein/day (1.2 - 1.5 grams of pro/kg)  Justification: Increased needs  Estimated Fluid Needs: 6515-1325 mL/day (25 - 30 mL/kg)  Justification: Maintenance    SYSTEM FINDINGS    GI symptoms: small formed BM today    MALNUTRITION  % Intake: </=50% for >/= 1 month (severe)  % Weight Loss: > 5% in 1 month (severe)   Subcutaneous Fat Loss: Triceps: Moderate  Muscle Loss: Interosseous muscles: Mild, Thigh (quadriceps): Moderate, and Calf (gastrocnemius): Moderate  Fluid Accumulation/Edema: None noted  Malnutrition Diagnosis: Severe malnutrition in the context of acute illness or injury  Malnutrition Present on Admission: Yes    NUTRITION DIAGNOSIS  Malnutrition (undernutrition) related to decreased appetite as evidenced by intake </= 50% x 1 month, 6.7% weight loss x 1.5 weeks, moderate muscle loss    Goals  -Total avg nutritional intake to meet a minimum of 2300 kcal/kg and 92 g PRO/kg daily (per dosing wt 76.7 kg).  -Weight maintenance 75 kg +     Monitoring/Evaluation  Progress toward goals will be monitored and evaluated per policy.

## 2025-01-17 NOTE — CONSULTS
Imaging reviewed. The deep lymph nodes are accessible for biopsy with CT guidance. This is a high risk procedure and our policy is that patients stay off aspirin for 3-5 days. This patient can have this biopsy performed as an outpatient. Please place an outpatient referral if this will be done as an outpatient. Please call CT to schedule this next week if this will be done as an inpatient.

## 2025-01-17 NOTE — PLAN OF CARE
Problem: Adult Inpatient Plan of Care  Goal: Plan of Care Review  Description: The Plan of Care Review/Shift note should be completed every shift.  The Outcome Evaluation is a brief statement about your assessment that the patient is improving, declining, or no change.  This information will be displayed automatically on your shift  note.  Outcome: Progressing     Problem: Pain Acute  Goal: Optimal Pain Control and Function  Outcome: Progressing  Intervention: Prevent or Manage Pain  Recent Flowsheet Documentation  Taken 1/17/2025 1015 by Hollie Austin RN  Bowel Elimination Promotion:   adequate fluid intake promoted   ambulation promoted   diet adjusted   privacy promoted  Taken 1/17/2025 0800 by Hollie Austin RN  Medication Review/Management: medications reviewed     Problem: Orthopaedic Fracture  Goal: Optimal Functional Ability  Outcome: Progressing  Intervention: Optimize Functional Ability  Recent Flowsheet Documentation  Taken 1/17/2025 0800 by Hollie Austin RN  Activity Management: activity adjusted per tolerance     Problem: Constipation  Goal: Effective Bowel Elimination  Outcome: Progressing  Intervention: Promote Effective Bowel Elimination  Recent Flowsheet Documentation  Taken 1/17/2025 1015 by oHllie Austin RN  Bowel Function Promotion:   prompt response to urge promoted   straining discouraged  Bowel Elimination Management:   suppository given   toileting offered   sitting position facilitated   relaxation techniques promoted   Goal Outcome Evaluation:       AxO4, pleasant pt. Ambulating SBA. Neck pain radiates down bilateral shoulders - PRN meds utilized with ice. Numb/tingling to upper extremities. C collar on when OOB. Pt c/o constipation & abd discomfort, requested suppository - had BM x2.

## 2025-01-17 NOTE — PROGRESS NOTES
[facility]    Neurosurgery  Daily Note    Assessment & Plan    Mr. Gleason is a 79-year-old male who presented to the emergency department for neck pain, generalized bodyaches, unintended weight loss, over the past month.     He reports intermittent paresthesias in his upper extremities, reported numbness in his palms yesterday.  Currently not having paresthesias.      IMAGING:  EXAM: MR CERVICAL SPINE W/O and W CONTRAST, MR LUMBAR SPINE W/O and W CONTRAST, MR THORACIC SPINE W/O and W CONTRAST  LOCATION: [facility]  DATE: 1/16/2025       FINDINGS:  CERVICAL SPINE:   Suboptimal study due to patient motion artifact.     T1 hypointense, T2 and STIR hyperintense signal changes with associated contrast enhancement in multiple cervical vertebral bodies and posterior elements. Mild height loss at C7, can represent pathologic fracture. There is retropulsion of the C7   vertebral body with questionable extraosseous extension. There is evidence of extraosseous extension in C7 spinous process. Prominent dorsal epidural T2 hyperintense signal and enhancement extending from it C6 down to T2.                                                                       IMPRESSION:     CERVICAL SPINE MRI:  1.  Suboptimal study due to patient motion artifact.  2.  T1 hypointense, T2 and STIR hyperintense signal changes with associated contrast enhancement in cervical vertebral bodies and posterior elements, most pronounced between C4 and C7 levels.  3.  Mild vertebral body height loss at C7, may represent pathology compression fracture. Evidence of extraosseous extension at C7, in particular in the spinous process and inferior vertebral body. Resultant severe bilateral neural foraminal stenosis at   C7-T1. Posterior epidural T2 hyperintense signal and enhancement extending from C6 down to T2 may represent external stenosis tumoral extension into epidural space.  4.  No definite abnormal  spinal cord signal.   5.  Multilevel cervical spondylosis as detailed above.     THORACIC SPINE MRI:  1.  Suboptimal study due to patient motion artifact.  2.  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 No significant vertebral body height loss to suggest pathologic compression fracture. No evidence of extraosseous extension.  3.  Multilevel thoracic spondylosis as detailed above in the body of the report.     LUMBAR SPINE MRI:  1.  Suboptimal study due to patient motion artifact and susceptibility artifact from fusion hardware.  2.  Diffuse osseous metastatic disease involving the lumbosacral vertebral bodies and posterior elements, most pronounced in L3-L5 levels. No significant vertebral body height loss to suggest pathologic compression fracture. Question retropulsion of L4   and L5 vertebral bodies with possible enhancing soft tissue in the anterior epidural space, suggestive of extraosseous extension. Resultant right to severe spinal canal stenosis at these levels.  3.  No evidence of extraosseous extension elsewhere in the lumbosacral spine.  4.  Multilevel lumbar spondylosis in the remaining lumbar spine as detailed in the body of the report.      Plan:  -No neurosurgical intervention planned at this time  -Wear collar when out of bed  -Recommend Radiation oncology  -Oncology  -Palliative  -Cervical xray in collar for baseline  -See outpatient in our clinic with repeat cervical xray in 4 to 6 weeks    Reviewed with Dr. Ch, DNP, APRN, CNP  [facility]  Tel [phone]      Interval History   Stable.  Doing well.  Improving slowly.  Pain is reasonably controlled.  No fevers.     Physical Exam   Temp: 97.8  F (36.6  C) Temp src: Oral BP: 126/64 Pulse: 69   Resp: 18 SpO2: 97 % O2 Device: None (Room air)    Vitals:    01/16/25 0738 01/17/25 1203   Weight: 169 lb (76.7 kg) 165 lb 6.4 oz (75 kg)     Vital Signs with  Ranges  Temp:  [97.8  F (36.6  C)-98.2  F (36.8  C)] 97.8  F (36.6  C)  Pulse:  [69-99] 69  Resp:  [18] 18  BP: (112-149)/(57-73) 126/64  SpO2:  [92 %-97 %] 97 %  I/O last 3 completed shifts:  In: 600 [P.O.:600]  Out: -   NEUROLOGICAL EXAMINATION:   Mental status: alert and oriented x3 speech clear and appropriate   Cranial nerves: II-XII intact  Motor strength:   Biceps: 5/5 right, 5/5 left   Wrist extensors:5/5 right, 5/5 left   Triceps: 5/5 right, 5/5 left   Deltoids: 5/5 right, 5/5 left   Finger flexion: 5/5 right, 5/5 left   Finger abduction:5/5 right, 5/5 left   Hand : 5/5 right, 5/5 left   Hip flexors: 5/5 right, 5/5 left   Quadriceps: 5/5 right, 5/5 left  Ankle dorsiflexion: 5/5 right, 5/5 left    Ankle plantar flexion:5/5 right, 5/5 left   Extensor hallicus longus: 5/5 right, 5/5 left   Sensation:  intact    Gait:  not tested    CBC RESULTS:   Recent Labs   Lab Test 01/17/25  0517   WBC 8.1   RBC 4.52   HGB 13.3   HCT 39.6*   MCV 88   MCH 29.4   MCHC 33.6   RDW 12.4        Basic Metabolic Panel:  Lab Results   Component Value Date     01/17/2025      Lab Results   Component Value Date    POTASSIUM 4.2 01/17/2025    POTASSIUM 4.7 07/12/2021     Lab Results   Component Value Date    CHLORIDE 100 01/17/2025    CHLORIDE 107 07/12/2021     Lab Results   Component Value Date    DONNA 9.7 01/17/2025     Lab Results   Component Value Date    CO2 25 01/17/2025    CO2 25 07/12/2021     Lab Results   Component Value Date    BUN 18.6 01/17/2025    BUN 20 07/12/2021     Lab Results   Component Value Date    CR 0.73 01/17/2025     Lab Results   Component Value Date     01/17/2025     09/05/2024     07/12/2021     INR:  Lab Results   Component Value Date    INR 1.11 09/04/2024    INR 1.03 07/12/2021    INR 1.02 01/06/2020           Medications   Current Facility-Administered Medications   Medication Dose Route Frequency Provider Last Rate Last Admin      Current Facility-Administered  Medications   Medication Dose Route Frequency Provider Last Rate Last Admin    aspirin EC tablet 81 mg  81 mg Oral Daily Charles Contreras DO   81 mg at 01/17/25 0811    senna-docusate (SENOKOT-S/PERICOLACE) 8.6-50 MG per tablet 1 tablet  1 tablet Oral BID Charles Contreras DO   1 tablet at 01/16/25 2123    Or    senna-docusate (SENOKOT-S/PERICOLACE) 8.6-50 MG per tablet 2 tablet  2 tablet Oral BID Charles Contreras DO   2 tablet at 01/17/25 0810    simvastatin (ZOCOR) tablet 20 mg  20 mg Oral At Bedtime Charles Contreras DO   20 mg at 01/16/25 2123    sodium chloride (PF) 0.9% PF flush 3 mL  3 mL Intracatheter Q8H Charles Contreras DO   3 mL at 01/16/25 2124    Vitamin D3 (CHOLECALCIFEROL) tablet 2,000 Units  2,000 Units Oral Daily Charles Contreras DO   2,000 Units at 01/17/25 0811                 Poor

## 2025-01-17 NOTE — PLAN OF CARE
Problem: Adult Inpatient Plan of Care  Goal: Readiness for Transition of Care  Intervention: Mutually Develop Transition Plan  Recent Flowsheet Documentation  Taken 1/16/2025 1711 by Samantha Chinchilla RN  Equipment Currently Used at Home: none     Problem: Adult Inpatient Plan of Care  Goal: Absence of Hospital-Acquired Illness or Injury  Intervention: Prevent Skin Injury  Recent Flowsheet Documentation  Taken 1/16/2025 1710 by Samantha Chinchilla, RN  Body Position: position changed independently  Taken 1/16/2025 1700 by Samantha Chinchilla RN  Body Position: position changed independently   Goal Outcome Evaluation:       Utilized oxycodone for pain, and was effective per patient. Sat up at the bedside for dinner. Iv saline locked.
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
## 2025-01-17 NOTE — PROGRESS NOTES
Discharge education given to patient and patient's spouse. No further questions/concerns at this time. Phone number given for medical records per patient's request since patient requested for a copy of his imaging. PRN oxycodone given for 7/10 RUE pain. Patient with no side effects prior to transferring down to the [facility] to go home via [employer] with his spouse.

## 2025-01-17 NOTE — UTILIZATION REVIEW
Inpatient appropriate    Admission Status; Secondary Review Determination       Under the authority of the Utilization Management Committee, the utilization review process indicated a secondary review on the above patient. The review outcome is based on review of the medical records, discussions with staff, and applying clinical experience noted on the date of the review.     (x) Inpatient Status Appropriate - This patient's medical care is consistent with medical management for inpatient care and reasonable inpatient medical practice.     RATIONALE FOR DETERMINATION   79 years old male presented with neck pain, weight loss and bodyaches.  Found pathological cervical fracture.  So far workup appeared to be metastatic prostate cancer.  Per oncology, we will likely need prostate biopsy and also started on some systemic therapy.   Ongoing poor pain control requiring frequent dose of IV pain med   At the time of admission with the information available to the attending physician more than 2 nights Hospital complex care was anticipated, based on patient risk of adverse outcome if treated as outpatient and complex care required. Inpatient admission is appropriate based on the Medicare guidelines.     The information on this document is developed by the utilization review team in order for the business office to ensure compliance. This only denotes the appropriateness of proper admission status and does not reflect the quality of care rendered.   The definitions of Inpatient Status and Observation Status used in making the determination above are those provided in the CMS Coverage Manual, Chapter 1 and Chapter 6, section 70.4.   Sincerely,   Yehuda Wylie MD  Utilization Review  Physician Advisor  [facility].

## 2025-01-17 NOTE — DISCHARGE SUMMARY
"[facility]  Hospitalist Discharge Summary      Date of Admission:  1/16/2025  Date of Discharge:  1/17/2025  Discharging Provider: Charles Contreras DO  Discharge Service: Hospitalist Service    Discharge Diagnoses   Severely elevated PSA - likely metastatic prostate cancer  C7 pathologic fracture  Osseous metastases  Pulmonary nodule  Severe malnutrition    Clinically Significant Risk Factors     # Overweight: Estimated body mass index is 25.51 kg/m  as calculated from the following:    Height as of this encounter: 1.715 m (5' 7.52\").    Weight as of this encounter: 75 kg (165 lb 6.4 oz).  # Severe Malnutrition: based on nutrition assessment      Follow-ups Needed After Discharge   Follow-up Appointments       Follow Up      Follow up with Interventional Radiology next week for Pelvic Lymph Node biopsy. (No Aspirin). Radiology will call you to schedule.    Follow up with Oncology, Dr. Sterling in 1-2 weeks to discuss treatment options. His office will call you to schedule.        Hospital Follow-up with Existing Primary Care Provider (PCP)      Please see details below         Schedule Primary Care visit within: 30 Days   Recommended labs and Imaging (to be ordered by Primary Care Provider): CMP, CBC               Unresulted Labs Ordered in the Past 30 Days of this Admission       Date and Time Order Name Status Description    1/17/2025  5:17 AM Protein Electrophoresis, Serum In process     1/17/2025 12:01 AM Protein Immunofixation Serum In process         These results will be followed up by Oncology, [facility]    Discharge Disposition   Discharged to home  Condition at discharge: Stable    Hospital Course   Patient is a 79-year-old male who presented with new upper back pain with radiculopathy, weight loss and found to have C7 pathologic fracture along with bony mets along the spine and lymphadenopathy throughout chest, abdomen and pelvis.  Radiologic findings highly suspicious for metastatic " prostate cancer.  PSA found to be severely elevated.  Neurosurgery consulted and placed Lorton collar.  After MRI evaluation, Aspen collar to be worn when out of bed.  Neurosurgery advised no driving.  Consulted oncology and interventional radiology.  Unfortunately biopsy not able to be done due to aspirin which needs to be held for 3 to 5 days for pelvic lymph node biopsy.  Ordered outpatient referral for biopsy be done next week.  Advised patient to hold aspirin.  Oncology follow-up in 1 to 2 weeks to discuss treatment options.    #C7 pathologic fracture  #Osseous metastases  #Pulmonary nodule  CT c/a/p w/ contrast: Lymphadenopathy in the chest, abdomen and pelvis.  Sclerotic osseous metastases.  Few indeterminate pulmonary nodules measuring up to 0.9 cm.  Neurosurgery consulted- no urgent surgery, placed Aspen collar  s/p 10mg IV dexamethasone, 1mg IV dilaudid, 15mg IV toradol  Pain control with tylenol for mild pain, tramadol moderate pain and oxycodone for severe pain prn  Bowel regimen to be continued at discharge    #Enlarged prostate with severely elevated PSA  Difficulty starting urine x1 month  Total , free PSA >50      Consultations This Hospital Stay   NEUROSURGERY IP CONSULT  HEMATOLOGY & ONCOLOGY IP CONSULT  CARE MANAGEMENT / SOCIAL WORK IP CONSULT  INTERVENTIONAL RADIOLOGY ADULT/PEDS IP CONSULT    Code Status   Full Code    Time Spent on this Encounter   I, Charles Contreras DO, personally saw the patient today and spent greater than 30 minutes discharging this patient.       Charles Contreras DO  [address] [address]  Phone: [phone]  Fax: [phone]  ______________________________________________________________________    Physical Exam   Vital Signs: Temp: 97.8  F (36.6  C) Temp src: Oral BP: 126/64 Pulse: 69   Resp: 18 SpO2: 97 % O2 Device: None (Room air)    Weight: 165 lbs 6.4 oz  General Appearance:  No acute  distress  Respiratory: Nonlabored breathing  Extremities: No peripheral edema or cyanosis  Neuro: Alert and oriented x 3, normal speech         Primary Care Physician   Ford Rodriguez    Discharge Orders      XR Cervical Spine 2/3 Views     IR Referral    Last dose ASA 1/17, now on hold for biopsy next week as outpatient     Adult Oncology/Hematology  Referral      Reason for your hospital stay    Pathologic fracture of cervical spine, metastatic cancer work up     Activity    Your activity upon discharge: no lifting, driving, or strenuous exercise for 4 weeks and longer if indicated by Neurosurgery/Oncology/Radiology     Follow Up    Follow up with Interventional Radiology next week for Pelvic Lymph Node biopsy. (No Aspirin). Radiology will call you to schedule.    Follow up with Oncology, Dr. Sterling in 1-2 weeks to discuss treatment options. His office will call you to schedule.     Diet    Follow this diet upon discharge: Current Diet:Orders Placed This Encounter      Snacks/Supplements Adult: Ensure Enlive; With Meals      Regular Diet Adult     Hospital Follow-up with Existing Primary Care Provider (PCP)    Please see details below            Significant Results and Procedures   Most Recent 3 CBC's:  Recent Labs   Lab Test 01/17/25  0517 01/16/25  0819 09/04/24  1526   WBC 8.1 7.0 6.1   HGB 13.3 13.6 12.3*   MCV 88 88 91    250 173     Most Recent 3 BMP's:  Recent Labs   Lab Test 01/17/25  0517 01/16/25  0819 09/05/24  0204 09/04/24  1526    137  --  138   POTASSIUM 4.2 3.9  --  4.5   CHLORIDE 100 102  --  106   CO2 25 23  --  24   BUN 18.6 17.3  --  20.4   CR 0.73 0.79  --  0.94   ANIONGAP 10 12  --  8   DONNA 9.7 9.6  --  8.5*   * 168* 123* 137*     Most Recent 2 LFT's:  Recent Labs   Lab Test 01/17/25  0517 01/16/25  0819   AST 24 25   ALT 21 21   ALKPHOS 984* 1,000*   BILITOTAL 0.4 0.4   ,   Results for orders placed or performed during the hospital encounter of 01/16/25   CT  Chest/Abdomen/Pelvis w Contrast    Narrative    EXAM: CT CHEST/ABDOMEN/PELVIS W CONTRAST  LOCATION: [facility]  DATE: 1/16/2025    INDICATION: Unintended weight loss over last multiple months generalized fatigue weakness and back pain malignancy eval  COMPARISON: None.  TECHNIQUE: CT scan of the chest, abdomen, and pelvis was performed following injection of IV contrast. A delayed phase of the abdomen was performed. Multiplanar reformats were obtained. Dose reduction techniques were used.   CONTRAST: 83ml isovue 370    FINDINGS:   LUNGS AND PLEURA: Mild bibasilar atelectasis/scar. No pleural effusion. A few pulmonary nodules with the largest being a 0.9 cm subpleural nodule in the lingula, image 150:5.    MEDIASTINUM/AXILLAE: Mediastinal and hilar lymphadenopathy with the largest being a right hilar node measuring 1.6 cm in short axis, image 61:4. Coronary artery bypass grafts.    CORONARY ARTERY CALCIFICATION: Previous intervention (stents or CABG).    HEPATOBILIARY: Normal.    PANCREAS: Normal.    SPLEEN: Normal.    ADRENAL GLANDS: Normal.    KIDNEYS/BLADDER: Simple renal cysts, no follow-up required.    BOWEL: No obstruction or inflammatory change. Normal appendix.    LYMPH NODES: Retroperitoneal and bilateral iliac lymphadenopathy with the largest being a left external iliac node measuring 2.0 cm in short axis, image 251:4.    VASCULATURE: No abdominal aortic aneurysm.    PELVIC ORGANS: The prostate is mildly enlarged and diffusely heterogeneous.    MUSCULOSKELETAL: Spine dictated separately. Median sternotomy. A few small ill-defined sclerotic bone lesions in the pelvis which are most consistent with metastatic disease.      Impression    IMPRESSION:  1.  Lymphadenopathy in the chest, abdomen and pelvis which is most likely metastatic.  2.  Sclerotic osseous metastases. While nonspecific, these are frequently seen with prostate cancer. Recommend correlation with PSA.  3.  A few  indeterminate pulmonary nodules measuring up to 0.9 cm.    CT Cervical Spine w/o Contrast    Narrative    EXAM: CT CERVICAL SPINE W/O CONTRAST, CT LUMBAR SPINE RECONSTRUCTED, CT THORACIC SPINE RECONSTRUCTED  LOCATION: [facility]  DATE: 1/16/2025    INDICATION: Back pain neck pain, numbness weakness unintended weight loss.  COMPARISON: None.   TECHNIQUE:   1) Routine CT Cervical Spine without IV contrast. Multiplanar reformats. Dose reduction techniques were used.   2) Dedicated axial, sagittal, and coronal images of the Thoracic Spine were generated utilizing CT chest source data and are separately reviewed. Dose reduction techniques were used.   3) Dedicated axial, sagittal, and coronal images of the Lumbar Spine were generated utilizing CT abdomen pelvis source data and are separately reviewed. Dose reduction techniques were used.     FINDINGS:    CERVICAL SPINE CT:  VERTEBRA: Nondisplaced lucencies through the lamina and bilateral facets at C7. Heterogeneous sclerotic C7 vertebral body with mild height loss relative to the remainder of the cervical vertebral bodies. Preserved vertebral heights. Degenerative C4-C5   anterolisthesis. Otherwise preserved alignment. No traumatic subluxation. No acute fracture. No prevertebral edema.    CANAL: No bony encroachment on the spinal canal.    PARASPINAL: No acute extraspinal abnormality    THORACIC SPINE CT:  VERTEBRA: Subtle sclerotic foci involving the T3, T7 vertebral bodies. Preserved vertebral heights and alignment. No traumatic subluxation. No acute fracture.    CANAL: No bony encroachment on the spinal canal.    PARASPINAL: No acute extraspinal abnormality.    LUMBAR SPINE CT:  VERTEBRA: Radiographically intact L4-L5 interbody, pedicle screw and larry instrumented fusion. Sclerotic lesions within the L2, L3 and L4 vertebral bodies, bilateral iliac bones. Preserved vertebral heights and alignment. No traumatic subluxation. No   acute  fracture.    CANAL: No bony encroachment on the spinal canal.    PARASPINAL: No acute extraspinal abnormality.      Impression    IMPRESSION:  CERVICAL SPINE CT:  1.  Bilateral nondisplaced C7 facet and lamina fractures and irregularly sclerotic C7 vertebral body. Finding could represent a recent pathologic fracture in the setting of sclerotic metastasis. No traumatic subluxation.    THORACIC SPINE CT:  1.  Negative for acute fracture or traumatic subluxation.  2.  Subtle sclerotic foci involving multiple vertebrae. Findings could represent sclerotic metastases.    LUMBAR SPINE CT:  1.  Negative for acute fracture or traumatic subluxation.  2.  Subtle sclerotic lesions involving multiple vertebral bodies, sacrum and iliac bones. Findings could represent sclerotic metastases.      Findings communicated to Dr. Wiggins by me at 1018 hours on 1/16/2025.     CT Thoracic Spine Reconstructed    Narrative    EXAM: CT CERVICAL SPINE W/O CONTRAST, CT LUMBAR SPINE RECONSTRUCTED, CT THORACIC SPINE RECONSTRUCTED  LOCATION: [facility]  DATE: 1/16/2025    INDICATION: Back pain neck pain, numbness weakness unintended weight loss.  COMPARISON: None.   TECHNIQUE:   1) Routine CT Cervical Spine without IV contrast. Multiplanar reformats. Dose reduction techniques were used.   2) Dedicated axial, sagittal, and coronal images of the Thoracic Spine were generated utilizing CT chest source data and are separately reviewed. Dose reduction techniques were used.   3) Dedicated axial, sagittal, and coronal images of the Lumbar Spine were generated utilizing CT abdomen pelvis source data and are separately reviewed. Dose reduction techniques were used.     FINDINGS:    CERVICAL SPINE CT:  VERTEBRA: Nondisplaced lucencies through the lamina and bilateral facets at C7. Heterogeneous sclerotic C7 vertebral body with mild height loss relative to the remainder of the cervical vertebral bodies. Preserved vertebral heights.  Degenerative C4-C5   anterolisthesis. Otherwise preserved alignment. No traumatic subluxation. No acute fracture. No prevertebral edema.    CANAL: No bony encroachment on the spinal canal.    PARASPINAL: No acute extraspinal abnormality    THORACIC SPINE CT:  VERTEBRA: Subtle sclerotic foci involving the T3, T7 vertebral bodies. Preserved vertebral heights and alignment. No traumatic subluxation. No acute fracture.    CANAL: No bony encroachment on the spinal canal.    PARASPINAL: No acute extraspinal abnormality.    LUMBAR SPINE CT:  VERTEBRA: Radiographically intact L4-L5 interbody, pedicle screw and larry instrumented fusion. Sclerotic lesions within the L2, L3 and L4 vertebral bodies, bilateral iliac bones. Preserved vertebral heights and alignment. No traumatic subluxation. No   acute fracture.    CANAL: No bony encroachment on the spinal canal.    PARASPINAL: No acute extraspinal abnormality.      Impression    IMPRESSION:  CERVICAL SPINE CT:  1.  Bilateral nondisplaced C7 facet and lamina fractures and irregularly sclerotic C7 vertebral body. Finding could represent a recent pathologic fracture in the setting of sclerotic metastasis. No traumatic subluxation.    THORACIC SPINE CT:  1.  Negative for acute fracture or traumatic subluxation.  2.  Subtle sclerotic foci involving multiple vertebrae. Findings could represent sclerotic metastases.    LUMBAR SPINE CT:  1.  Negative for acute fracture or traumatic subluxation.  2.  Subtle sclerotic lesions involving multiple vertebral bodies, sacrum and iliac bones. Findings could represent sclerotic metastases.      Findings communicated to Dr. Wiggins by me at 1018 hours on 1/16/2025.     CT Lumbar Spine Reconstructed    Narrative    EXAM: CT CERVICAL SPINE W/O CONTRAST, CT LUMBAR SPINE RECONSTRUCTED, CT THORACIC SPINE RECONSTRUCTED  LOCATION: [facility]  DATE: 1/16/2025    INDICATION: Back pain neck pain, numbness weakness unintended weight  loss.  COMPARISON: None.   TECHNIQUE:   1) Routine CT Cervical Spine without IV contrast. Multiplanar reformats. Dose reduction techniques were used.   2) Dedicated axial, sagittal, and coronal images of the Thoracic Spine were generated utilizing CT chest source data and are separately reviewed. Dose reduction techniques were used.   3) Dedicated axial, sagittal, and coronal images of the Lumbar Spine were generated utilizing CT abdomen pelvis source data and are separately reviewed. Dose reduction techniques were used.     FINDINGS:    CERVICAL SPINE CT:  VERTEBRA: Nondisplaced lucencies through the lamina and bilateral facets at C7. Heterogeneous sclerotic C7 vertebral body with mild height loss relative to the remainder of the cervical vertebral bodies. Preserved vertebral heights. Degenerative C4-C5   anterolisthesis. Otherwise preserved alignment. No traumatic subluxation. No acute fracture. No prevertebral edema.    CANAL: No bony encroachment on the spinal canal.    PARASPINAL: No acute extraspinal abnormality    THORACIC SPINE CT:  VERTEBRA: Subtle sclerotic foci involving the T3, T7 vertebral bodies. Preserved vertebral heights and alignment. No traumatic subluxation. No acute fracture.    CANAL: No bony encroachment on the spinal canal.    PARASPINAL: No acute extraspinal abnormality.    LUMBAR SPINE CT:  VERTEBRA: Radiographically intact L4-L5 interbody, pedicle screw and larry instrumented fusion. Sclerotic lesions within the L2, L3 and L4 vertebral bodies, bilateral iliac bones. Preserved vertebral heights and alignment. No traumatic subluxation. No   acute fracture.    CANAL: No bony encroachment on the spinal canal.    PARASPINAL: No acute extraspinal abnormality.      Impression    IMPRESSION:  CERVICAL SPINE CT:  1.  Bilateral nondisplaced C7 facet and lamina fractures and irregularly sclerotic C7 vertebral body. Finding could represent a recent pathologic fracture in the setting of sclerotic  metastasis. No traumatic subluxation.    THORACIC SPINE CT:  1.  Negative for acute fracture or traumatic subluxation.  2.  Subtle sclerotic foci involving multiple vertebrae. Findings could represent sclerotic metastases.    LUMBAR SPINE CT:  1.  Negative for acute fracture or traumatic subluxation.  2.  Subtle sclerotic lesions involving multiple vertebral bodies, sacrum and iliac bones. Findings could represent sclerotic metastases.      Findings communicated to Dr. Wiggins by me at 1018 hours on 1/16/2025.     MR Cervical Spine w/o & w Contrast    Narrative    EXAM: MR CERVICAL SPINE W/O and W CONTRAST, MR LUMBAR SPINE W/O and W CONTRAST, MR THORACIC SPINE W/O and W CONTRAST  LOCATION: [facility]  DATE: 1/16/2025    INDICATION: pathologic fracture C7, further characterization  COMPARISON: None.  CONTRAST: 8 mL Gadavist  TECHNIQUE:   1) MRI Cervical Spine without and with IV contrast.  2) MRI Thoracic Spine without and with IV contrast.  3) MRI Lumbar Spine without and with IV contrast.    FINDINGS:  CERVICAL SPINE:   Suboptimal study due to patient motion artifact.    T1 hypointense, T2 and STIR hyperintense signal changes with associated contrast enhancement in multiple cervical vertebral bodies and posterior elements. Mild height loss at C7, can represent pathologic fracture. There is retropulsion of the C7   vertebral body with questionable extraosseous extension. There is evidence of extraosseous extension in C7 spinous process. Prominent dorsal epidural T2 hyperintense signal and enhancement extending from it C6 down to T2.     No abnormal spinal cord signal.    Multilevel disc height loss, osteophyte formation and facet arthropathy.    Craniovertebral junction and C1-C2: Normal.    C2-C3: Normal disc height. No herniation. Bilateral facet arthropathy. No spinal canal or neural foraminal stenosis.     C3-C4: Mild disc height loss and disc degeneration. Right eccentric disc  osteophyte complex and bilateral facet arthropathy moderate to severe right and moderate left neural foraminal stenosis. Mild to moderate spinal canal stenosis.     C4-C5: Mild to moderate disc height loss. Disc osteophyte complex and left greater than right facet arthropathy. Mild to moderate bilateral neural foraminal stenosis. No spinal canal stenosis.     C5-C6: Mild disc height loss and disc degeneration. Disc osteophyte complex and superimposed central protrusion. Bilateral facet arthropathy. Severe left and moderate to severe right neural foraminal stenosis. Mild spinal canal stenosis     C6-C7: Mild disc height loss and disc degeneration. Disc osteophyte complex and bilateral uncinate spurring. Bilateral facet arthropathy severe bilateral neural foraminal stenosis. Moderate spinal canal stenosis.     C7-T1: Mild disc height loss and disc degeneration. Bilateral facet arthropathy. Extraosseous extension into bilateral neural foramina results in severe bilateral neural foraminal stenosis. Mild spinal canal stenosis.    THORACIC SPINE:  Suboptimal study due to patient motion artifact.    T1 hypointense, T2 and STIR hyperintense signal changes with associated contrast enhancement in multiple vertebral bodies and posterior elements, most prominent in T1-T3 vertebral bodies and posterior elements. No pathologic compression fracture. No   evidence of extraosseous extension.    Multilevel disc height loss, disc degeneration, osteophyte formation and facet arthropathy. Moderate spinal canal stenosis at T10-11, mild to moderate spinal canal stenosis at T9-10 and mild spinal canal stenosis at T8-9. Severe bilateral neural   foraminal stenosis at T1-T2 and T10-11.    Multilevel mixed Modic type I and type II endplate changes.    No abnormal spinal cord signal.    No extraspinal abnormality.    LUMBAR SPINE:   Suboptimal study due to patient motion artifact and susceptibility artifact from fusion  hardware.    Nomenclature is based on 5 lumbar type vertebral bodies.     Postsurgical changes of instrumented anterior and posterior spinal fusion at L4-5.    T1 hypointense, T2 and STIR hyperintense signal changes with associated involvement in the lumbosacral vertebral bodies and posterior elements. No significant height loss to suggest pathology compression fracture. Question ventral epidural enhancing soft   tissue at L4 and L5 levels, may represent extraosseous extension. Although difficult to evaluate, there appears to be severe spinal canal stenosis at L4 and L5 levels.    Multilevel disc height loss, osteophyte formation and facet arthropathy. Mild spinal canal stenosis at L1-2 and mild to moderate spinal canal stenosis at L2-3. Prominent epidural fat contributes to severe spinal canal stenosis at L5-S1.    Bilateral renal cysts.    The tip of the conus medullaris likely at L2 level. Cauda equina nerve roots cannot be well evaluated.       Impression    IMPRESSION:    CERVICAL SPINE MRI:  1.  Suboptimal study due to patient motion artifact.  2.  T1 hypointense, T2 and STIR hyperintense signal changes with associated contrast enhancement in cervical vertebral bodies and posterior elements, most pronounced between C4 and C7 levels.  3.  Mild vertebral body height loss at C7, may represent pathology compression fracture. Evidence of extraosseous extension at C7, in particular in the spinous process and inferior vertebral body. Resultant severe bilateral neural foraminal stenosis at   C7-T1. Posterior epidural T2 hyperintense signal and enhancement extending from C6 down to T2 may represent external stenosis tumoral extension into epidural space.  4.  No definite abnormal spinal cord signal.   5.  Multilevel cervical spondylosis as detailed above.    THORACIC SPINE MRI:  1.  Suboptimal study due to patient motion artifact.  2.  Diffuse osseous metastatic disease in the thoracic vertebral bodies and posterior  elements. No significant vertebral body height loss to suggest pathologic compression fracture. No evidence of extraosseous extension.  3.  Multilevel thoracic spondylosis as detailed above in the body of the report.    LUMBAR SPINE MRI:  1.  Suboptimal study due to patient motion artifact and susceptibility artifact from fusion hardware.  2.  Diffuse osseous metastatic disease involving the lumbosacral vertebral bodies and posterior elements, most pronounced in L3-L5 levels. No significant vertebral body height loss to suggest pathologic compression fracture. Question retropulsion of L4   and L5 vertebral bodies with possible enhancing soft tissue in the anterior epidural space, suggestive of extraosseous extension. Resultant right to severe spinal canal stenosis at these levels.  3.  No evidence of extraosseous extension elsewhere in the lumbosacral spine.  4.  Multilevel lumbar spondylosis in the remaining lumbar spine as detailed in the body of the report.   MR Brain w/o & w Contrast    Narrative    EXAM: MR BRAIN W/O and W CONTRAST  LOCATION: [facility]  DATE: 1/16/2025    INDICATION: eval for malignancy  COMPARISON: Brain MRI 4/9/2020.  CONTRAST: 8 mL Gadavist  TECHNIQUE: Routine multiplanar multisequence head MRI without and with intravenous contrast.    FINDINGS:  INTRACRANIAL CONTENTS: No acute or subacute infarct. No mass, acute hemorrhage, or extra-axial fluid collections. Scattered nonspecific T2/FLAIR hyperintensities within the cerebral white matter most consistent with mild chronic microvascular ischemic   change. Mild to moderate generalized cerebral volume loss. Normal ventricles and sulci. Normal position of the cerebellar tonsils. No pathologic contrast enhancement.    SELLA: No abnormality accounting for technique.    OSSEOUS STRUCTURES/SOFT TISSUES: Normal marrow signal. The major intracranial vascular flow voids are maintained.     ORBITS: Prior bilateral cataract  surgery. Visualized portions of the orbits are otherwise unremarkable.     SINUSES/MASTOIDS: No paranasal sinus mucosal disease. No middle ear or mastoid effusion.       Impression    IMPRESSION:  1.  No acute intracranial process.  2.  No abnormal intracranial enhancement to suggest intracranial malignancy.  3.  Generalized brain atrophy and presumed microvascular ischemic changes as detailed above.     MR Thoracic Spine w/o & w Contrast    Narrative    EXAM: MR CERVICAL SPINE W/O and W CONTRAST, MR LUMBAR SPINE W/O and W CONTRAST, MR THORACIC SPINE W/O and W CONTRAST  LOCATION: [facility]  DATE: 1/16/2025    INDICATION: pathologic fracture C7, further characterization  COMPARISON: None.  CONTRAST: 8 mL Gadavist  TECHNIQUE:   1) MRI Cervical Spine without and with IV contrast.  2) MRI Thoracic Spine without and with IV contrast.  3) MRI Lumbar Spine without and with IV contrast.    FINDINGS:  CERVICAL SPINE:   Suboptimal study due to patient motion artifact.    T1 hypointense, T2 and STIR hyperintense signal changes with associated contrast enhancement in multiple cervical vertebral bodies and posterior elements. Mild height loss at C7, can represent pathologic fracture. There is retropulsion of the C7   vertebral body with questionable extraosseous extension. There is evidence of extraosseous extension in C7 spinous process. Prominent dorsal epidural T2 hyperintense signal and enhancement extending from it C6 down to T2.     No abnormal spinal cord signal.    Multilevel disc height loss, osteophyte formation and facet arthropathy.    Craniovertebral junction and C1-C2: Normal.    C2-C3: Normal disc height. No herniation. Bilateral facet arthropathy. No spinal canal or neural foraminal stenosis.     C3-C4: Mild disc height loss and disc degeneration. Right eccentric disc osteophyte complex and bilateral facet arthropathy moderate to severe right and moderate left neural foraminal stenosis.  Mild to moderate spinal canal stenosis.     C4-C5: Mild to moderate disc height loss. Disc osteophyte complex and left greater than right facet arthropathy. Mild to moderate bilateral neural foraminal stenosis. No spinal canal stenosis.     C5-C6: Mild disc height loss and disc degeneration. Disc osteophyte complex and superimposed central protrusion. Bilateral facet arthropathy. Severe left and moderate to severe right neural foraminal stenosis. Mild spinal canal stenosis     C6-C7: Mild disc height loss and disc degeneration. Disc osteophyte complex and bilateral uncinate spurring. Bilateral facet arthropathy severe bilateral neural foraminal stenosis. Moderate spinal canal stenosis.     C7-T1: Mild disc height loss and disc degeneration. Bilateral facet arthropathy. Extraosseous extension into bilateral neural foramina results in severe bilateral neural foraminal stenosis. Mild spinal canal stenosis.    THORACIC SPINE:  Suboptimal study due to patient motion artifact.    T1 hypointense, T2 and STIR hyperintense signal changes with associated contrast enhancement in multiple vertebral bodies and posterior elements, most prominent in T1-T3 vertebral bodies and posterior elements. No pathologic compression fracture. No   evidence of extraosseous extension.    Multilevel disc height loss, disc degeneration, osteophyte formation and facet arthropathy. Moderate spinal canal stenosis at T10-11, mild to moderate spinal canal stenosis at T9-10 and mild spinal canal stenosis at T8-9. Severe bilateral neural   foraminal stenosis at T1-T2 and T10-11.    Multilevel mixed Modic type I and type II endplate changes.    No abnormal spinal cord signal.    No extraspinal abnormality.    LUMBAR SPINE:   Suboptimal study due to patient motion artifact and susceptibility artifact from fusion hardware.    Nomenclature is based on 5 lumbar type vertebral bodies.     Postsurgical changes of instrumented anterior and posterior spinal  fusion at L4-5.    T1 hypointense, T2 and STIR hyperintense signal changes with associated involvement in the lumbosacral vertebral bodies and posterior elements. No significant height loss to suggest pathology compression fracture. Question ventral epidural enhancing soft   tissue at L4 and L5 levels, may represent extraosseous extension. Although difficult to evaluate, there appears to be severe spinal canal stenosis at L4 and L5 levels.    Multilevel disc height loss, osteophyte formation and facet arthropathy. Mild spinal canal stenosis at L1-2 and mild to moderate spinal canal stenosis at L2-3. Prominent epidural fat contributes to severe spinal canal stenosis at L5-S1.    Bilateral renal cysts.    The tip of the conus medullaris likely at L2 level. Cauda equina nerve roots cannot be well evaluated.       Impression    IMPRESSION:    CERVICAL SPINE MRI:  1.  Suboptimal study due to patient motion artifact.  2.  T1 hypointense, T2 and STIR hyperintense signal changes with associated contrast enhancement in cervical vertebral bodies and posterior elements, most pronounced between C4 and C7 levels.  3.  Mild vertebral body height loss at C7, may represent pathology compression fracture. Evidence of extraosseous extension at C7, in particular in the spinous process and inferior vertebral body. Resultant severe bilateral neural foraminal stenosis at   C7-T1. Posterior epidural T2 hyperintense signal and enhancement extending from C6 down to T2 may represent external stenosis tumoral extension into epidural space.  4.  No definite abnormal spinal cord signal.   5.  Multilevel cervical spondylosis as detailed above.    THORACIC SPINE MRI:  1.  Suboptimal study due to patient motion artifact.  2.  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 No significant vertebral body height loss to suggest pathologic compression fracture. No evidence of extraosseous extension.  3.   Multilevel thoracic spondylosis as detailed above in the body of the report.    LUMBAR SPINE MRI:  1.  Suboptimal study due to patient motion artifact and susceptibility artifact from fusion hardware.  2.  Diffuse osseous metastatic disease involving the lumbosacral vertebral bodies and posterior elements, most pronounced in L3-L5 levels. No significant vertebral body height loss to suggest pathologic compression fracture. Question retropulsion of L4   and L5 vertebral bodies with possible enhancing soft tissue in the anterior epidural space, suggestive of extraosseous extension. Resultant right to severe spinal canal stenosis at these levels.  3.  No evidence of extraosseous extension elsewhere in the lumbosacral spine.  4.  Multilevel lumbar spondylosis in the remaining lumbar spine as detailed in the body of the report.   MR Lumbar Spine w/o & w Contrast    Narrative    EXAM: MR CERVICAL SPINE W/O and W CONTRAST, MR LUMBAR SPINE W/O and W CONTRAST, MR THORACIC SPINE W/O and W CONTRAST  LOCATION: [facility]  DATE: 1/16/2025    INDICATION: pathologic fracture C7, further characterization  COMPARISON: None.  CONTRAST: 8 mL Gadavist  TECHNIQUE:   1) MRI Cervical Spine without and with IV contrast.  2) MRI Thoracic Spine without and with IV contrast.  3) MRI Lumbar Spine without and with IV contrast.    FINDINGS:  CERVICAL SPINE:   Suboptimal study due to patient motion artifact.    T1 hypointense, T2 and STIR hyperintense signal changes with associated contrast enhancement in multiple cervical vertebral bodies and posterior elements. Mild height loss at C7, can represent pathologic fracture. There is retropulsion of the C7   vertebral body with questionable extraosseous extension. There is evidence of extraosseous extension in C7 spinous process. Prominent dorsal epidural T2 hyperintense signal and enhancement extending from it C6 down to T2.     No abnormal spinal cord signal.    Multilevel  disc height loss, osteophyte formation and facet arthropathy.    Craniovertebral junction and C1-C2: Normal.    C2-C3: Normal disc height. No herniation. Bilateral facet arthropathy. No spinal canal or neural foraminal stenosis.     C3-C4: Mild disc height loss and disc degeneration. Right eccentric disc osteophyte complex and bilateral facet arthropathy moderate to severe right and moderate left neural foraminal stenosis. Mild to moderate spinal canal stenosis.     C4-C5: Mild to moderate disc height loss. Disc osteophyte complex and left greater than right facet arthropathy. Mild to moderate bilateral neural foraminal stenosis. No spinal canal stenosis.     C5-C6: Mild disc height loss and disc degeneration. Disc osteophyte complex and superimposed central protrusion. Bilateral facet arthropathy. Severe left and moderate to severe right neural foraminal stenosis. Mild spinal canal stenosis     C6-C7: Mild disc height loss and disc degeneration. Disc osteophyte complex and bilateral uncinate spurring. Bilateral facet arthropathy severe bilateral neural foraminal stenosis. Moderate spinal canal stenosis.     C7-T1: Mild disc height loss and disc degeneration. Bilateral facet arthropathy. Extraosseous extension into bilateral neural foramina results in severe bilateral neural foraminal stenosis. Mild spinal canal stenosis.    THORACIC SPINE:  Suboptimal study due to patient motion artifact.    T1 hypointense, T2 and STIR hyperintense signal changes with associated contrast enhancement in multiple vertebral bodies and posterior elements, most prominent in T1-T3 vertebral bodies and posterior elements. No pathologic compression fracture. No   evidence of extraosseous extension.    Multilevel disc height loss, disc degeneration, osteophyte formation and facet arthropathy. Moderate spinal canal stenosis at T10-11, mild to moderate spinal canal stenosis at T9-10 and mild spinal canal stenosis at T8-9. Severe bilateral  neural   foraminal stenosis at T1-T2 and T10-11.    Multilevel mixed Modic type I and type II endplate changes.    No abnormal spinal cord signal.    No extraspinal abnormality.    LUMBAR SPINE:   Suboptimal study due to patient motion artifact and susceptibility artifact from fusion hardware.    Nomenclature is based on 5 lumbar type vertebral bodies.     Postsurgical changes of instrumented anterior and posterior spinal fusion at L4-5.    T1 hypointense, T2 and STIR hyperintense signal changes with associated involvement in the lumbosacral vertebral bodies and posterior elements. No significant height loss to suggest pathology compression fracture. Question ventral epidural enhancing soft   tissue at L4 and L5 levels, may represent extraosseous extension. Although difficult to evaluate, there appears to be severe spinal canal stenosis at L4 and L5 levels.    Multilevel disc height loss, osteophyte formation and facet arthropathy. Mild spinal canal stenosis at L1-2 and mild to moderate spinal canal stenosis at L2-3. Prominent epidural fat contributes to severe spinal canal stenosis at L5-S1.    Bilateral renal cysts.    The tip of the conus medullaris likely at L2 level. Cauda equina nerve roots cannot be well evaluated.       Impression    IMPRESSION:    CERVICAL SPINE MRI:  1.  Suboptimal study due to patient motion artifact.  2.  T1 hypointense, T2 and STIR hyperintense signal changes with associated contrast enhancement in cervical vertebral bodies and posterior elements, most pronounced between C4 and C7 levels.  3.  Mild vertebral body height loss at C7, may represent pathology compression fracture. Evidence of extraosseous extension at C7, in particular in the spinous process and inferior vertebral body. Resultant severe bilateral neural foraminal stenosis at   C7-T1. Posterior epidural T2 hyperintense signal and enhancement extending from C6 down to T2 may represent external stenosis tumoral extension into  epidural space.  4.  No definite abnormal spinal cord signal.   5.  Multilevel cervical spondylosis as detailed above.    THORACIC SPINE MRI:  1.  Suboptimal study due to patient motion artifact.  2.  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 No significant vertebral body height loss to suggest pathologic compression fracture. No evidence of extraosseous extension.  3.  Multilevel thoracic spondylosis as detailed above in the body of the report.    LUMBAR SPINE MRI:  1.  Suboptimal study due to patient motion artifact and susceptibility artifact from fusion hardware.  2.  Diffuse osseous metastatic disease involving the lumbosacral vertebral bodies and posterior elements, most pronounced in L3-L5 levels. No significant vertebral body height loss to suggest pathologic compression fracture. Question retropulsion of L4   and L5 vertebral bodies with possible enhancing soft tissue in the anterior epidural space, suggestive of extraosseous extension. Resultant right to severe spinal canal stenosis at these levels.  3.  No evidence of extraosseous extension elsewhere in the lumbosacral spine.  4.  Multilevel lumbar spondylosis in the remaining lumbar spine as detailed in the body of the report.   XR Cervical Spine 2/3 Views    Narrative    EXAM: XR CERVICAL SPINE 2/3 VIEWS  LOCATION: [facility]  DATE: 1/17/2025    INDICATION: C7 pathologic compression fracture in collar, note alignment to compare to outpatient xrays  COMPARISON: 1/16/2025 CT/MRI scan.      Impression    IMPRESSION:     The marrow invasive process at the C7 vertebrae is not visualized on plain film x-ray. Shoulder artifact obscures the C7-T1 level on the lateral view.    The remaining levels otherwise demonstrate DDD change including loss of disc height at C3-4, C4-5, C5-C6 and C6-C7.    No prevertebral soft tissue swelling.         Discharge Medications   Current Discharge  Medication List        START taking these medications    Details   acetaminophen (TYLENOL) 325 MG tablet Take 2 tablets (650 mg) by mouth every 4 hours as needed for mild pain.    Associated Diagnoses: Pathological fracture of cervical vertebra, initial encounter      bisacodyl (DULCOLAX) 10 MG suppository Place 1 suppository (10 mg) rectally daily as needed for constipation.  Qty: 20 suppository, Refills: 3    Associated Diagnoses: Pathological fracture of cervical vertebra, initial encounter      docusate sodium (COLACE) 100 MG capsule Take 1 capsule (100 mg) by mouth 2 times daily.  Qty: 60 capsule, Refills: 3    Associated Diagnoses: Pathological fracture of cervical vertebra, initial encounter      oxyCODONE (ROXICODONE) 5 MG tablet Take 1-2 tablets (5-10 mg) by mouth every 4 hours as needed for severe pain.  Qty: 40 tablet, Refills: 0    Associated Diagnoses: Pathological fracture of cervical vertebra, initial encounter      polyethylene glycol (MIRALAX) 17 GM/Dose powder Take 17 g by mouth daily.    Associated Diagnoses: Pathological fracture of cervical vertebra, initial encounter      senna-docusate (SENOKOT-S/PERICOLACE) 8.6-50 MG tablet Take 2 tablets by mouth 2 times daily.  Qty: 120 tablet, Refills: 3    Associated Diagnoses: Pathological fracture of cervical vertebra, initial encounter      thiamine (B-1) 100 MG tablet Take 1 tablet (100 mg) by mouth daily.  Qty: 30 tablet, Refills: 3    Associated Diagnoses: Pathological fracture of cervical vertebra, initial encounter           CONTINUE these medications which have CHANGED    Details   traMADol (ULTRAM) 50 MG tablet Take 1 tablet (50 mg) by mouth every 6 hours as needed for moderate pain.    Associated Diagnoses: Pathological fracture of cervical vertebra, initial encounter           CONTINUE these medications which have NOT CHANGED    Details   cholecalciferol, vitamin D3, 2,000 unit Tab [CHOLECALCIFEROL, VITAMIN D3, 2,000 UNIT TAB] Take 2,000 Units  by mouth daily.      nitroglycerin (NITROSTAT) 0.4 MG SL tablet [NITROGLYCERIN (NITROSTAT) 0.4 MG SL TABLET] Place 0.4 mg under the tongue every 5 (five) minutes as needed.      omeprazole (PRILOSEC) 20 MG capsule Take 20 mg by mouth daily as needed.      psyllium (METAMUCIL) 3.4 gram packet [PSYLLIUM (METAMUCIL) 3.4 GRAM PACKET] Take 1 packet by mouth daily as needed.       simvastatin (ZOCOR) 20 MG tablet Take 20 mg by mouth at bedtime.           STOP taking these medications       aspirin 81 MG EC tablet Comments:   Reason for Stopping:             Allergies   Allergies   Allergen Reactions    Gabapentin Other (See Comments)     Numbness, paresthesias, fatigue    Atorvastatin Headache

## 2025-01-17 NOTE — PLAN OF CARE
Problem: Adult Inpatient Plan of Care  Goal: Plan of Care Review  Description: The Plan of Care Review/Shift note should be completed every shift.  The Outcome Evaluation is a brief statement about your assessment that the patient is improving, declining, or no change.  This information will be displayed automatically on your shift  note.  Outcome: Progressing  Flowsheets (Taken 1/17/2025 2536)  Plan of Care Reviewed With: patient  Overall Patient Progress: improving   Goal Outcome Evaluation:      Plan of Care Reviewed With: patient    Overall Patient Progress: improvingOverall Patient Progress: improving         Alert and oriented. Patient admitted inpatient with pathological fracture of cervical vertebra.  Metastatic prostate cancer. Neurosurgery and hem/onc following. PSA results pending. Tele monitoring, NSR. IV dilaudid given at 0324.
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
## 2025-01-19 ENCOUNTER — APPOINTMENT (OUTPATIENT)
Dept: CT IMAGING | Facility: HOSPITAL | Age: 80
End: 2025-01-19
Payer: COMMERCIAL

## 2025-01-19 ENCOUNTER — PATIENT OUTREACH (OUTPATIENT)
Dept: CARE COORDINATION | Facility: CLINIC | Age: 80
End: 2025-01-19
Payer: COMMERCIAL

## 2025-01-19 ENCOUNTER — HOSPITAL ENCOUNTER (EMERGENCY)
Facility: HOSPITAL | Age: 80
Discharge: HOME | End: 2025-01-19
Attending: FAMILY MEDICINE | Admitting: FAMILY MEDICINE
Payer: COMMERCIAL

## 2025-01-19 VITALS
SYSTOLIC BLOOD PRESSURE: 135 MMHG | HEIGHT: 68 IN | HEART RATE: 82 BPM | BODY MASS INDEX: 25.79 KG/M2 | DIASTOLIC BLOOD PRESSURE: 97 MMHG | WEIGHT: 170.2 LBS | OXYGEN SATURATION: 98 % | RESPIRATION RATE: 18 BRPM | TEMPERATURE: 98 F

## 2025-01-19 DIAGNOSIS — R10.84 ABDOMINAL PAIN, GENERALIZED: ICD-10-CM

## 2025-01-19 DIAGNOSIS — K59.03 DRUG-INDUCED CONSTIPATION: ICD-10-CM

## 2025-01-19 LAB
ALBUMIN SERPL BCG-MCNC: 4 G/DL (ref 3.5–5.2)
ALP SERPL-CCNC: 1041 U/L (ref 40–150)
ALT SERPL W P-5'-P-CCNC: 20 U/L (ref 0–70)
ANION GAP SERPL CALCULATED.3IONS-SCNC: 10 MMOL/L (ref 7–15)
AST SERPL W P-5'-P-CCNC: 31 U/L (ref 0–45)
BASOPHILS # BLD AUTO: 0 10E3/UL (ref 0–0.2)
BASOPHILS NFR BLD AUTO: 0 %
BILIRUB DIRECT SERPL-MCNC: <0.2 MG/DL (ref 0–0.3)
BILIRUB SERPL-MCNC: 0.3 MG/DL
BUN SERPL-MCNC: 19.3 MG/DL (ref 8–23)
CALCIUM SERPL-MCNC: 9.7 MG/DL (ref 8.8–10.4)
CHLORIDE SERPL-SCNC: 99 MMOL/L (ref 98–107)
CREAT SERPL-MCNC: 0.9 MG/DL (ref 0.67–1.17)
EGFRCR SERPLBLD CKD-EPI 2021: 87 ML/MIN/1.73M2
EOSINOPHIL # BLD AUTO: 0.2 10E3/UL (ref 0–0.7)
EOSINOPHIL NFR BLD AUTO: 2 %
ERYTHROCYTE [DISTWIDTH] IN BLOOD BY AUTOMATED COUNT: 12.3 % (ref 10–15)
GLUCOSE SERPL-MCNC: 159 MG/DL (ref 70–99)
HCO3 SERPL-SCNC: 25 MMOL/L (ref 22–29)
HCT VFR BLD AUTO: 42.2 % (ref 40–53)
HGB BLD-MCNC: 13.6 G/DL (ref 13.3–17.7)
HOLD SPECIMEN: NORMAL
IMM GRANULOCYTES # BLD: 0.1 10E3/UL
IMM GRANULOCYTES NFR BLD: 1 %
LIPASE SERPL-CCNC: 22 U/L (ref 13–60)
LYMPHOCYTES # BLD AUTO: 1.7 10E3/UL (ref 0.8–5.3)
LYMPHOCYTES NFR BLD AUTO: 18 %
MAGNESIUM SERPL-MCNC: 2.1 MG/DL (ref 1.7–2.3)
MCH RBC QN AUTO: 28.8 PG (ref 26.5–33)
MCHC RBC AUTO-ENTMCNC: 32.2 G/DL (ref 31.5–36.5)
MCV RBC AUTO: 89 FL (ref 78–100)
MONOCYTES # BLD AUTO: 0.8 10E3/UL (ref 0–1.3)
MONOCYTES NFR BLD AUTO: 8 %
NEUTROPHILS # BLD AUTO: 6.5 10E3/UL (ref 1.6–8.3)
NEUTROPHILS NFR BLD AUTO: 71 %
NRBC # BLD AUTO: 0 10E3/UL
NRBC BLD AUTO-RTO: 0 /100
PLATELET # BLD AUTO: 293 10E3/UL (ref 150–450)
POTASSIUM SERPL-SCNC: 4.8 MMOL/L (ref 3.4–5.3)
PROT SERPL-MCNC: 7.4 G/DL (ref 6.4–8.3)
RBC # BLD AUTO: 4.72 10E6/UL (ref 4.4–5.9)
SODIUM SERPL-SCNC: 134 MMOL/L (ref 135–145)
WBC # BLD AUTO: 9.2 10E3/UL (ref 4–11)

## 2025-01-19 PROCEDURE — 36415 COLL VENOUS BLD VENIPUNCTURE: CPT | Performed by: FAMILY MEDICINE

## 2025-01-19 PROCEDURE — 83735 ASSAY OF MAGNESIUM: CPT

## 2025-01-19 PROCEDURE — 250N000011 HC RX IP 250 OP 636

## 2025-01-19 PROCEDURE — 83690 ASSAY OF LIPASE: CPT

## 2025-01-19 PROCEDURE — 82248 BILIRUBIN DIRECT: CPT

## 2025-01-19 PROCEDURE — 85025 COMPLETE CBC W/AUTO DIFF WBC: CPT

## 2025-01-19 PROCEDURE — 82947 ASSAY GLUCOSE BLOOD QUANT: CPT

## 2025-01-19 PROCEDURE — 258N000003 HC RX IP 258 OP 636

## 2025-01-19 PROCEDURE — 74177 CT ABD & PELVIS W/CONTRAST: CPT

## 2025-01-19 PROCEDURE — 82374 ASSAY BLOOD CARBON DIOXIDE: CPT

## 2025-01-19 PROCEDURE — 250N000013 HC RX MED GY IP 250 OP 250 PS 637

## 2025-01-19 PROCEDURE — 99285 EMERGENCY DEPT VISIT HI MDM: CPT | Mod: 25

## 2025-01-19 PROCEDURE — 96374 THER/PROPH/DIAG INJ IV PUSH: CPT

## 2025-01-19 PROCEDURE — 96361 HYDRATE IV INFUSION ADD-ON: CPT

## 2025-01-19 RX ORDER — OXYCODONE HYDROCHLORIDE 5 MG/1
5 TABLET ORAL ONCE
Status: COMPLETED | OUTPATIENT
Start: 2025-01-19 | End: 2025-01-19

## 2025-01-19 RX ORDER — IOPAMIDOL 755 MG/ML
70 INJECTION, SOLUTION INTRAVASCULAR ONCE
Status: COMPLETED | OUTPATIENT
Start: 2025-01-19 | End: 2025-01-19

## 2025-01-19 RX ORDER — HYDROMORPHONE HYDROCHLORIDE 1 MG/ML
0.5 INJECTION, SOLUTION INTRAMUSCULAR; INTRAVENOUS; SUBCUTANEOUS
Status: COMPLETED | OUTPATIENT
Start: 2025-01-19 | End: 2025-01-19

## 2025-01-19 RX ADMIN — IOPAMIDOL 70 ML: 755 INJECTION, SOLUTION INTRAVENOUS at 21:30

## 2025-01-19 RX ADMIN — SODIUM CHLORIDE 1000 ML: 9 INJECTION, SOLUTION INTRAVENOUS at 21:04

## 2025-01-19 RX ADMIN — HYDROMORPHONE HYDROCHLORIDE 0.5 MG: 1 INJECTION, SOLUTION INTRAMUSCULAR; INTRAVENOUS; SUBCUTANEOUS at 21:04

## 2025-01-19 RX ADMIN — OXYCODONE HYDROCHLORIDE 5 MG: 5 TABLET ORAL at 22:56

## 2025-01-19 ASSESSMENT — ACTIVITIES OF DAILY LIVING (ADL)
ADLS_ACUITY_SCORE: 59

## 2025-01-19 NOTE — PROGRESS NOTES
Connected Care Resource Center:   [facility] Contact  [facility]/Voicemail     Clinical Data: Post-Discharge Outreach     Outreach attempted x 2.  Left message on patient's voicemail, providing [facility]'s central phone number of [phone] ([phone]) for questions/concerns and/or to schedule an appt with an [facility] provider, if they do not have a PCP.      Plan:  [facility] will do no further outreaches at this time.       Claudia Christianson MA  Connected Care Resource Center, [facility]    *Connected Care Resource Team does NOT follow patient ongoing. Referrals are identified based on internal discharge reports and the outreach is to ensure patient has an understanding of their discharge instructions.

## 2025-01-20 ENCOUNTER — HOSPITAL ENCOUNTER (EMERGENCY)
Facility: HOSPITAL | Age: 80
Discharge: HOME | End: 2025-01-21
Attending: EMERGENCY MEDICINE | Admitting: EMERGENCY MEDICINE
Payer: COMMERCIAL

## 2025-01-20 ENCOUNTER — APPOINTMENT (OUTPATIENT)
Dept: CT IMAGING | Facility: HOSPITAL | Age: 80
End: 2025-01-20
Attending: EMERGENCY MEDICINE
Payer: COMMERCIAL

## 2025-01-20 ENCOUNTER — APPOINTMENT (OUTPATIENT)
Dept: RADIOLOGY | Facility: HOSPITAL | Age: 80
End: 2025-01-20
Attending: EMERGENCY MEDICINE
Payer: COMMERCIAL

## 2025-01-20 VITALS
TEMPERATURE: 98.5 F | SYSTOLIC BLOOD PRESSURE: 178 MMHG | HEART RATE: 84 BPM | DIASTOLIC BLOOD PRESSURE: 67 MMHG | RESPIRATION RATE: 16 BRPM | WEIGHT: 170.19 LBS | BODY MASS INDEX: 26.26 KG/M2 | OXYGEN SATURATION: 99 %

## 2025-01-20 DIAGNOSIS — K59.03 DRUG-INDUCED CONSTIPATION: ICD-10-CM

## 2025-01-20 LAB
ALBUMIN SERPL ELPH-MCNC: 3.5 G/DL (ref 3.7–5.1)
ALPHA1 GLOB SERPL ELPH-MCNC: 0.4 G/DL (ref 0.2–0.4)
ALPHA2 GLOB SERPL ELPH-MCNC: 0.9 G/DL (ref 0.5–0.9)
B-GLOBULIN SERPL ELPH-MCNC: 0.8 G/DL (ref 0.6–1)
GAMMA GLOB SERPL ELPH-MCNC: 1 G/DL (ref 0.7–1.6)
M PROTEIN SERPL ELPH-MCNC: 0 G/DL
PROT PATTERN SERPL ELPH-IMP: ABNORMAL
PROT PATTERN SERPL IFE-IMP: NORMAL

## 2025-01-20 PROCEDURE — 84165 PROTEIN E-PHORESIS SERUM: CPT | Mod: 26 | Performed by: PATHOLOGY

## 2025-01-20 PROCEDURE — 74019 RADEX ABDOMEN 2 VIEWS: CPT

## 2025-01-20 PROCEDURE — 96372 THER/PROPH/DIAG INJ SC/IM: CPT | Performed by: EMERGENCY MEDICINE

## 2025-01-20 PROCEDURE — 99285 EMERGENCY DEPT VISIT HI MDM: CPT | Mod: 25

## 2025-01-20 PROCEDURE — 86334 IMMUNOFIX E-PHORESIS SERUM: CPT | Mod: 26 | Performed by: PATHOLOGY

## 2025-01-20 PROCEDURE — 74176 CT ABD & PELVIS W/O CONTRAST: CPT

## 2025-01-20 PROCEDURE — 250N000011 HC RX IP 250 OP 636: Performed by: EMERGENCY MEDICINE

## 2025-01-20 PROCEDURE — 250N000012 HC RX MED GY IP 250 OP 636 PS 637: Mod: JZ | Performed by: EMERGENCY MEDICINE

## 2025-01-20 RX ORDER — KETOROLAC TROMETHAMINE 15 MG/ML
15 INJECTION, SOLUTION INTRAMUSCULAR; INTRAVENOUS ONCE
Status: DISCONTINUED | OUTPATIENT
Start: 2025-01-20 | End: 2025-01-20

## 2025-01-20 RX ORDER — KETOROLAC TROMETHAMINE 15 MG/ML
15 INJECTION, SOLUTION INTRAMUSCULAR; INTRAVENOUS ONCE
Status: COMPLETED | OUTPATIENT
Start: 2025-01-20 | End: 2025-01-20

## 2025-01-20 RX ADMIN — METHYLNALTREXONE BROMIDE 12 MG: 12 INJECTION, SOLUTION SUBCUTANEOUS at 22:48

## 2025-01-20 RX ADMIN — KETOROLAC TROMETHAMINE 15 MG: 15 INJECTION, SOLUTION INTRAMUSCULAR; INTRAVENOUS at 22:35

## 2025-01-20 ASSESSMENT — ACTIVITIES OF DAILY LIVING (ADL)
ADLS_ACUITY_SCORE: 59
ADLS_ACUITY_SCORE: 59

## 2025-01-20 NOTE — ED TRIAGE NOTES
Patient arrives from home. Reports he was recently discharged from hospital. Has been constipated and was given an enema on Friday prior to discharge. Patient had small baldo of stool on Friday. Patient reports having 9/10 adominal pain. Tried 2x enema, 2, laxative, 2 miralax, and senna with no relief and no stool.     Triage Assessment (Adult)       Row Name 01/19/25 1937          Triage Assessment    Airway WDL WDL        Respiratory WDL    Respiratory WDL WDL        Cognitive/Neuro/Behavioral WDL    Cognitive/Neuro/Behavioral WDL WDL

## 2025-01-20 NOTE — DISCHARGE INSTRUCTIONS
Because the stool is at the beginning of the colon, you will likely not have a bowel movement in the ED tonight, even if an enema were given. I want you to increase your bowel regimen with Miralax (2 capfuls daily), senna, colace, dulcolax until you are able to have a bowel movement. I also recommend continuing some sort of laxative or stool softener while you are taking the narcotics for your neck pain to keep your bowels soft and regular. Please return to the ED for new or worsening symptoms.

## 2025-01-20 NOTE — ED PROVIDER NOTES
Emergency Department Encounter   NAME: Kamille Gleason  AGE: 79 year old male   YOB: 1945 ;   MRN: [id number] ;    ED PROVIDER: Brianna Madear PA-C    PCP: Ford Rodriguez    Evaluation Date & Time:   1/19/2025  7:57 PM    CHIEF COMPLAINT:  Constipation      FINAL IMPRESSION:    ICD-10-CM    1. Drug-induced constipation  K59.03       2. Abdominal pain, generalized  R10.84             IMPRESSION AND PLAN   MDM: Kamille Gleason is a 79 year old male with a pertinent history of CAD s/p CABG x3, HLD, DM2 who presents to the ED by walk-in for evaluation of constipation and abdominal pain despite several medications and enemas at home. Patient reports most recent BM was 2 days ago although the stool was very hard. He denies hematochezia or melena. No history of abdominal surgeries.     Vitals - hypertensive otherwise vitally stable. On exam patient is in obvious discomfort due to abdominal pain. Abdomen is mildly distended with diffuse tenderness to palpation. Bowel sounds normoactive. Patient still able to pass gas. No nausea or vomiting, has been able to tolerate oral intake. Differentials include bowel obstruction, hepatobiliary etiology, appendicitis, pancreatitis, PUD, diverticulitis.  Will start patient on IV fluids with a dose of Dilaudid.  Will also order basic labs and CT abdomen.    CBC without leukocytosis, hemoglobin stable.  BMP unremarkable.  Lipase certainly not concerning for acute pancreatitis.  Hepatic function does reveal a markedly elevated alk phos however, patient recently had extremely elevated PSA concerning for prostate cancer.  He is currently following with oncology.  Recent CT from 1/14/2025 did reveal possible osseous lesions concerning for metastasis which I believed to be the cause of the elevated alk phos at this time.  CT abdomen reveals concerns regarding possible prostate cancer however no evidence of bowel obstruction.  There does appear to be a moderate stool  burden in the ascending colon which I believed to be the cause of his symptoms.  However, given the location of the stool, do not feel that an enema given here in the emergency department would provide any relief or result in a bowel movement tonight.  Will plan to just provide oral laxatives and stool softeners until patient is able to pass stool.  Upon reevaluation, patient is resting comfortably in his hospital bed and remains vitally stable.  He reports symptomatic improvement with medications given in the ED.  I discussed CT findings with him and provided reassurance.  I encouraged him to increase his bowel regimen to include both laxatives, stool softeners, plenty of oral hydration and increasing his fiber intake through his diet.  He is to continue this regimen especially while taking narcotics for his recent cervical spinal fracture as I believe the pain medications are exacerbating his constipation.  Patient is agreeable to this plan and feels comfortable discharge at this time.      Medical Decision Making  Obtained supplemental history:Supplemental history obtained?: Family Member/Significant Other  Reviewed external records: External records reviewed?: Inpatient Record: [facility] admission 1/16/2025 to 1/17/2025  Care impacted by chronic illness:Chronic Pain, Diabetes, and Hyperlipidemia  Care significantly affected by social determinants of health:N/A  Did you consider but not order tests?: Work up considered but not performed and documented in chart, if applicable  Did you interpret images independently?: Independent interpretation of ECG and images noted in documentation, when applicable.  Consultation discussion with other provider:Did you involve another provider (consultant, , pharmacy, etc.)?: No  Discharge. No recommendations on prescription strength medication(s). See documentation for any additional details.    Not Applicable       ED COURSE:  8:27 PM I met and introduced myself to  the patient. I gathered initial history and performed my physical exam. We discussed plan for initial workup.   8:39 PM I have staffed the patient with Dr. Velasquez, ED MD, who has evaluated the patient and agrees with all aspects of today's care.   10:30 PM I rechecked the patient and discussed results, discharge, follow up, and reasons to return to the ED.           MEDICATIONS GIVEN IN THE EMERGENCY DEPARTMENT:  Medications   HYDROmorphone (PF) (DILAUDID) injection 0.5 mg (0.5 mg Intravenous $Given 1/19/25 2104)   sodium chloride 0.9% BOLUS 1,000 mL (0 mLs Intravenous Stopped 1/19/25 2249)   iopamidol (ISOVUE-370) solution 70 mL (70 mLs Intravenous $Given 1/19/25 2130)   oxyCODONE (ROXICODONE) tablet 5 mg (5 mg Oral $Given 1/19/25 2256)         NEW PRESCRIPTIONS STARTED AT TODAY'S ED VISIT:  Discharge Medication List as of 1/19/2025 10:49 PM            BRIEF HPI   Patient information was obtained from: Patient, wife   Use of Intrepreter: N/A     Kamille Gleason is a 79 year old male with a pertinent history of T2DM, HLD, and chronic pain syndrome who presents to the ED by walk in for evaluation of constipation.    Per the patient and wife, patient endorses constipation since 1/17 (2 days ago) when he was discharged from [facility] (refer to chart review below). He was given an enema prior to discharge but was only able to pass a small amount of hard stool. He reports that it was difficult for him to have this bowel movement but denied black or bloody stools. He has been able to pass gas. Patient also endorses abdominal pain but no rectal pain. Today, the patient tried 2 enemas, 2 suppositories and 2 doses of MiraLax without relief of his constipation. His wife also put on gloves and used her hand to help with the enema and did not feel any stool in the rectal vault. Patient denies any history of hemorrhoids or past abdominal surgeries. No other concerns at this time.     Of note, the patient has been  "taking oxycodone every 4 hours and tramadol every 6 hours with improvement of his neck and back pain.      Per chart review, the patient was admitted to [facility] from 1/16/2025 to 1/17/2025. He presented with new upper back pain with radiculopathy, weight loss and found to have C7 pathologic fracture along with bony mets along the spine and lymphadenopathy throughout chest, abdomen and pelvis. Radiologic findings highly suspicious for metastatic prostate cancer. Neurosurgery consulted and placed Mantee collar. Unfortunately biopsy not able to be done due to aspirin which needs to be held for 3 to 5 days for pelvic lymph node biopsy. Ordered outpatient referral for biopsy be done next week. Discharged with tylenol, dulcolax, colace, oxycodone, MiraLax, senna-docusate, and thiamine tablets.       REVIEW OF SYSTEMS:  Pertinent positive and negative symptoms per HPI.       MEDICAL HISTORY     Past Medical History:   Diagnosis Date    CAD (coronary artery disease)     Hyperlipidemia        Past Surgical History:   Procedure Laterality Date    BACK SURGERY      BYPASS GRAFT ARTERY CORONARY  2007    3-vessel    KNEE SURGERY Left        Family History   Problem Relation Age of Onset    Coronary Artery Disease Brother        Social History     Tobacco Use    Smoking status: Former    Smokeless tobacco: Never    Tobacco comments:     pt quit 10 years ago   Substance Use Topics    Alcohol use: Yes     Comment: 2 times per week most    Drug use: No         PHYSICAL EXAM     First Vitals:  Patient Vitals for the past 24 hrs:   BP Temp Temp src Pulse Resp SpO2 Height Weight   01/19/25 2151 (!) 135/97 -- -- 82 -- 98 % -- --   01/19/25 2106 (!) 149/80 -- -- 85 -- 95 % -- --   01/19/25 1936 (!) 168/97 98  F (36.7  C) Oral 99 18 95 % 1.715 m (5' 7.5\") 77.2 kg (170 lb 3.2 oz)       PHYSICAL EXAM:  Physical Exam  Vitals and nursing note reviewed.   Constitutional:       General: He is not in acute distress.     Appearance: " Normal appearance. He is normal weight. He is not ill-appearing or toxic-appearing.   HENT:      Head: Normocephalic and atraumatic.      Mouth/Throat:      Mouth: Mucous membranes are moist.   Eyes:      Conjunctiva/sclera: Conjunctivae normal.   Cardiovascular:      Rate and Rhythm: Normal rate.   Pulmonary:      Effort: Pulmonary effort is normal.   Abdominal:      General: Abdomen is flat. Bowel sounds are normal. There is no distension.      Palpations: Abdomen is soft.      Tenderness: There is abdominal tenderness. There is no guarding.   Musculoskeletal:      Cervical back: Normal range of motion and neck supple.   Skin:     General: Skin is warm and dry.   Neurological:      General: No focal deficit present.      Mental Status: He is alert and oriented to person, place, and time.   Psychiatric:         Mood and Affect: Mood normal.          RESULTS     LAB:  All pertinent labs reviewed and interpreted  Labs Ordered and Resulted from Time of ED Arrival to Time of ED Departure   BASIC METABOLIC PANEL - Abnormal       Result Value    Sodium 134 (*)     Potassium 4.8      Chloride 99      Carbon Dioxide (CO2) 25      Anion Gap 10      Urea Nitrogen 19.3      Creatinine 0.90      GFR Estimate 87      Calcium 9.7      Glucose 159 (*)    HEPATIC FUNCTION PANEL - Abnormal    Protein Total 7.4      Albumin 4.0      Bilirubin Total 0.3      Alkaline Phosphatase 1,041 (*)     AST 31      ALT 20      Bilirubin Direct <0.20     LIPASE - Normal    Lipase 22     MAGNESIUM - Normal    Magnesium 2.1     CBC WITH PLATELETS AND DIFFERENTIAL    WBC Count 9.2      RBC Count 4.72      Hemoglobin 13.6      Hematocrit 42.2      MCV 89      MCH 28.8      MCHC 32.2      RDW 12.3      Platelet Count 293      % Neutrophils 71      % Lymphocytes 18      % Monocytes 8      % Eosinophils 2      % Basophils 0      % Immature Granulocytes 1      NRBCs per 100 WBC 0      Absolute Neutrophils 6.5      Absolute Lymphocytes 1.7      Absolute  Monocytes 0.8      Absolute Eosinophils 0.2      Absolute Basophils 0.0      Absolute Immature Granulocytes 0.1      Absolute NRBCs 0.0         RADIOLOGY:  CT Abdomen Pelvis w Contrast   Final Result   IMPRESSION:    1.  Retroperitoneal and pelvic adenopathy again seen.   2.  Sclerotic lesions within the pelvis presumably metastatic.   3.  Prominent, heterogeneous prostate again seen. PSA correlation recommended.   4.  Basilar pulmonary nodules unchanged.            I, David Marie, am serving as a scribe to document services personally performed by Brianna Madera PA-C, based on my observation and the provider's statements to me. I, Brianna Madera PA-C attest that David Marie is acting in a scribe capacity, has observed my performance of the services and has documented them in accordance with my direction.       Brianna Madera PA-C  Emergency Medicine   [facility] EMERGENCY DEPARTMENT       Brianna Madera PA-C  01/20/25 0020

## 2025-01-20 NOTE — ED PROVIDER NOTES
"Emergency Department Midlevel Supervisory Note     I had a face to face encounter with this patient seen by the Advanced Practice Provider (MADIHA). I personally made/approved the management plan and take responsibility for the patient management. I personally saw patient and performed a substantive portion of the visit including all aspects of the medical decision making.     ED Course:  8:39 PM  Brianna Madera PA-C staffed patient with me. I agree with their assessment and plan of management, and I will see the patient.  I met with the patient to introduce myself, gather additional history, perform my initial exam, and discuss the plan.     Procedures:  I was present for the key portions of procedures documented in MADIHA/midlevel note, see midlevel note for further details.    MDM:  79-year-old male who comes in today concerned about constipation.  Last bowel movement was 2 days ago.  No nausea or vomiting, tried multiple medications at home for this.  On exam, patient is vitally stable, no abdominal distension.  CT of the abdomen and pelvis independently interpreted by me demonstrates moderate volume stool in the ascending colon, no other acute findings and no stool low in the rectum.       1. Drug-induced constipation    2. Abdominal pain, generalized        Brief HPI:     Kamille Gleason is a 79 year old male who presents for evaluation of constipation, last bowel movement being 2 days ago. Patient denies nausea or vomiting.     I, Jacques Naranjo, am serving as a scribe to document services personally performed by Otto Velasquez M.D., based on my observations and the provider's statements to me.   I, Otto Velasquez M.D. attest that Jacques Naranjo was acting in a scribe capacity, has observed my performance of the services and has documented them in accordance with my direction.    Brief Physical Exam: BP (!) 135/97   Pulse 82   Temp 98  F (36.7  C) (Oral)   Resp 18   Ht 1.715 m (5' 7.5\")   Wt 77.2 kg " (170 lb 3.2 oz)   SpO2 98%   BMI 26.26 kg/m    Physical Exam  Vitals and nursing note reviewed.   Constitutional:       Appearance: Normal appearance.   HENT:      Head: Normocephalic and atraumatic.      Right Ear: External ear normal.      Left Ear: External ear normal.      Nose: Nose normal.   Eyes:      Extraocular Movements: Extraocular movements intact.      Conjunctiva/sclera: Conjunctivae normal.      Pupils: Pupils are equal, round, and reactive to light.   Pulmonary:      Effort: Pulmonary effort is normal.   Musculoskeletal:         General: No swelling or deformity. Normal range of motion.      Cervical back: Normal range of motion.   Neurological:      General: No focal deficit present.      Mental Status: He is alert and oriented to person, place, and time. Mental status is at baseline.   Psychiatric:         Mood and Affect: Mood normal.         Behavior: Behavior normal.         Thought Content: Thought content normal.           Labs and Imaging:  Results for orders placed or performed during the hospital encounter of 01/19/25   CT Abdomen Pelvis w Contrast    Impression    IMPRESSION:   1.  Retroperitoneal and pelvic adenopathy again seen.  2.  Sclerotic lesions within the pelvis presumably metastatic.  3.  Prominent, heterogeneous prostate again seen. PSA correlation recommended.  4.  Basilar pulmonary nodules unchanged.   Extra Blue Top Tube   Result Value Ref Range    Hold Specimen JIC    Extra Red Top Tube   Result Value Ref Range    Hold Specimen JIC    Extra Green Top (Lithium Heparin) Tube   Result Value Ref Range    Hold Specimen JIC    Extra Purple Top Tube   Result Value Ref Range    Hold Specimen JIC    Extra Green Top (Lithium Heparin) ON ICE   Result Value Ref Range    Hold Specimen JIC    Basic metabolic panel   Result Value Ref Range    Sodium 134 (L) 135 - 145 mmol/L    Potassium 4.8 3.4 - 5.3 mmol/L    Chloride 99 98 - 107 mmol/L    Carbon Dioxide (CO2) 25 22 - 29 mmol/L    Anion  Gap 10 7 - 15 mmol/L    Urea Nitrogen 19.3 8.0 - 23.0 mg/dL    Creatinine 0.90 0.67 - 1.17 mg/dL    GFR Estimate 87 >60 mL/min/1.73m2    Calcium 9.7 8.8 - 10.4 mg/dL    Glucose 159 (H) 70 - 99 mg/dL   Hepatic function panel   Result Value Ref Range    Protein Total 7.4 6.4 - 8.3 g/dL    Albumin 4.0 3.5 - 5.2 g/dL    Bilirubin Total 0.3 <=1.2 mg/dL    Alkaline Phosphatase 1,041 (H) 40 - 150 U/L    AST 31 0 - 45 U/L    ALT 20 0 - 70 U/L    Bilirubin Direct <0.20 0.00 - 0.30 mg/dL   Result Value Ref Range    Lipase 22 13 - 60 U/L   Result Value Ref Range    Magnesium 2.1 1.7 - 2.3 mg/dL   CBC with platelets and differential   Result Value Ref Range    WBC Count 9.2 4.0 - 11.0 10e3/uL    RBC Count 4.72 4.40 - 5.90 10e6/uL    Hemoglobin 13.6 13.3 - 17.7 g/dL    Hematocrit 42.2 40.0 - 53.0 %    MCV 89 78 - 100 fL    MCH 28.8 26.5 - 33.0 pg    MCHC 32.2 31.5 - 36.5 g/dL    RDW 12.3 10.0 - 15.0 %    Platelet Count 293 150 - 450 10e3/uL    % Neutrophils 71 %    % Lymphocytes 18 %    % Monocytes 8 %    % Eosinophils 2 %    % Basophils 0 %    % Immature Granulocytes 1 %    NRBCs per 100 WBC 0 <1 /100    Absolute Neutrophils 6.5 1.6 - 8.3 10e3/uL    Absolute Lymphocytes 1.7 0.8 - 5.3 10e3/uL    Absolute Monocytes 0.8 0.0 - 1.3 10e3/uL    Absolute Eosinophils 0.2 0.0 - 0.7 10e3/uL    Absolute Basophils 0.0 0.0 - 0.2 10e3/uL    Absolute Immature Granulocytes 0.1 <=0.4 10e3/uL    Absolute NRBCs 0.0 10e3/uL         Otto Velasquez M.D.  [facility] EMERGENCY DEPARTMENT  [address] [place] [id number] [phone]     Otto Velasquez MD  01/19/25 5733

## 2025-01-21 ENCOUNTER — TELEPHONE (OUTPATIENT)
Dept: NEUROSURGERY | Facility: CLINIC | Age: 80
End: 2025-01-21
Payer: COMMERCIAL

## 2025-01-21 NOTE — TELEPHONE ENCOUNTER
Date: January 21, 2025    Provider: MADIHA     Provider/Other: Radiology Department    Reason for out-going call:  HOSPITAL/ ED FOLLOW UP       Detailed message: Left voicemail & sent MyChart message to call clinic to schedule 4-6 week Post ED follow up with any MADIHA and to set up XR cervical spine 2/3 views prior to appointment.           Number provider for patient: [facility]: [phone]

## 2025-01-21 NOTE — LETTER
2025    Kamille Gleason   [address] [address]    MRN: [id number]  : 1945            Dear Kamille Gleason,  We have attempted to contact you to schedule a follow-up/consult and or imaging visit with [facility].    If you are still interested in seeing us for a follow-up/consult visit, please call our office to schedule your appointment. We can be reached at [phone].    We look forward to providing you with care. As always, we are available at the above telephone number for any questions or concerns you may have.    Sincerely,      [facility] Team
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
## 2025-01-21 NOTE — DISCHARGE INSTRUCTIONS
The imaging does not show any new findings like an obstruction.  It is very common to have issues with constipation when taking the pain medications and the medication tonight was administered to try and help reverse this.  We also recommend that you do take the mag citrate that was prescribed at the urgency room earlier today.

## 2025-01-21 NOTE — ED TRIAGE NOTES
Pt here with complaints of ongoing constipation since being prescribed oxycodone for a back injury. Pt was seen yesterday for the same and was told to continue using miralax, dulcolax, and colace. He used all of them since and has not produced a BM. Last BM was five days ago. States he has not been able to eat due to the abdominal discomfort.      Triage Assessment (Adult)       Row Name 01/20/25 1812          Triage Assessment    Airway WDL WDL        Respiratory WDL    Respiratory WDL WDL        Skin Circulation/Temperature WDL    Skin Circulation/Temperature WDL WDL        Cardiac WDL    Cardiac WDL WDL        Peripheral/Neurovascular WDL    Peripheral Neurovascular WDL WDL        Cognitive/Neuro/Behavioral WDL    Cognitive/Neuro/Behavioral WDL WDL

## 2025-01-21 NOTE — ED NOTES
Emergency Department transition of care note    Assumed care of patient from Hansel Bland DO at 11:09 PM.    Kamille Gleason is a 79 year old male who presented for constipation, abdominal pain, and abdominal distension for 5 days.     To Do: follow-up imaging    Update: X-ray did show some dilated bowel and a CT scan was recommended.  Even though 1 was done yesterday I do not see mention of these loops so I did add a CT without contrast.  Fortunately there is no evidence of obstruction.  Patient was given a dose of Relistor and has the mag citrate from emergency room earlier today in his possession.  Will discharge with ongoing supportive care at home and instructions to take the mag citrate    Disp: discharge      Final Impression:  1. Drug-induced constipation         Obdulia Petit MD  01/21/25 0020

## 2025-01-23 ENCOUNTER — TELEPHONE (OUTPATIENT)
Dept: ONCOLOGY | Facility: HOSPITAL | Age: 80
End: 2025-01-23
Payer: COMMERCIAL

## 2025-01-23 ENCOUNTER — PATIENT OUTREACH (OUTPATIENT)
Dept: ONCOLOGY | Facility: HOSPITAL | Age: 80
End: 2025-01-23

## 2025-01-23 ENCOUNTER — PRE VISIT (OUTPATIENT)
Dept: ONCOLOGY | Facility: HOSPITAL | Age: 80
End: 2025-01-23
Payer: COMMERCIAL

## 2025-01-23 NOTE — TELEPHONE ENCOUNTER
RECORDS STATUS - ALL OTHER DIAGNOSIS      RECORDS RECEIVED FROM: [facility]   NOTES STATUS DETAILS   OFFICE NOTE from referring provider Epic Dr. Charles Contreras   DISCHARGE SUMMARY from hospital [facility] 1/16/2025 - [facility]   DISCHARGE REPORT from the ER [facility] 1/20/2025, 1/19/2025 - [facility]   MEDICATION LIST [facility]    LABS     PATHOLOGY REPORTS     ANYTHING RELATED TO DIAGNOSIS Epic 1/19/2025   IMAGING (NEED IMAGES & REPORT)     CT SCANS PACS CT Abdomen Pelvis: 1/20/2025, 1/19/2025  CT CAP: 1/16/2025   XRAYS PACS Xray Abdomen: 1/20/2025

## 2025-01-24 PROBLEM — C79.51 PROSTATE CANCER METASTATIC TO BONE (H): Status: ACTIVE | Noted: 2025-01-24

## 2025-01-24 PROBLEM — C61 PROSTATE CANCER (H): Status: ACTIVE | Noted: 2025-01-24

## 2025-01-27 ENCOUNTER — MYC MEDICAL ADVICE (OUTPATIENT)
Dept: INTERVENTIONAL RADIOLOGY/VASCULAR | Facility: CLINIC | Age: 80
End: 2025-01-27
Payer: COMMERCIAL

## 2025-01-27 NOTE — PROGRESS NOTES
[facility]                                                                                            Situation: Patient chart reviewed by care coordinator.    Background: Patient with what looks to be a probable diagnosis of metastatic prostate cancer.    Assessment: Patient was seen in the clinic today by Dr Sterling who recommends that the patient should have a PSMA PET scan to know the full extent of his disease.  He also will need a CT or ultrasound-guided biopsy to get a tissue diagnosis.  He should then follow-up with Dr Sterling clinic.    Plan/Recommendations: Patient has been scheduled for a PSMA PET scan on 1/28, biopsy on 1/29 and will follow-up with Dr Sterling on 1/30 prior to being out of the clinic until 2/17/2025.    Also, message sent to our research team asking if he is eligible for the MERCK [id number] study.    Signature:  Nasrin Nuñez RN

## 2025-01-27 NOTE — TELEPHONE ENCOUNTER
Date: January 27, 2025    Provider: MADIHA     Provider/Other: Radiology Department    Reason for out-going call:  HOSPITAL/ ED FOLLOW UP       Detailed message: Left voicemail to call clinic to schedule 4-6 week post ED (1/16/25) follow up with any MADIHA and to set up XR cervical spine 2/3 views prior to appointment.           Number provider for patient: [facility]: [phone]

## 2025-01-28 ENCOUNTER — HOSPITAL ENCOUNTER (OUTPATIENT)
Dept: PET IMAGING | Facility: HOSPITAL | Age: 80
Discharge: HOME | End: 2025-01-28
Attending: INTERNAL MEDICINE
Payer: COMMERCIAL

## 2025-01-28 DIAGNOSIS — C61 PROSTATE CANCER (H): ICD-10-CM

## 2025-01-28 PROCEDURE — 343N000001 HC RX 343 MED OP 636: Performed by: INTERNAL MEDICINE

## 2025-01-28 PROCEDURE — 78815 PET IMAGE W/CT SKULL-THIGH: CPT | Mod: PI

## 2025-01-28 PROCEDURE — A9596 HC RX 343 MED OP 636: HCPCS | Performed by: INTERNAL MEDICINE

## 2025-01-28 RX ADMIN — KIT FOR THE PREPARATION OF GALLIUM GA 68 GOZETOTIDE INJECTION 5.95 MILLICURIE: KIT INTRAVENOUS at 10:03

## 2025-01-29 ENCOUNTER — TRANSFERRED RECORDS (OUTPATIENT)
Dept: HEALTH INFORMATION MANAGEMENT | Facility: CLINIC | Age: 80
End: 2025-01-29
Payer: COMMERCIAL

## 2025-01-29 ENCOUNTER — HOSPITAL ENCOUNTER (OUTPATIENT)
Dept: CT IMAGING | Facility: CLINIC | Age: 80
Discharge: HOME | End: 2025-01-29
Attending: HOSPITALIST
Payer: COMMERCIAL

## 2025-01-29 VITALS
TEMPERATURE: 98 F | SYSTOLIC BLOOD PRESSURE: 150 MMHG | DIASTOLIC BLOOD PRESSURE: 88 MMHG | OXYGEN SATURATION: 98 % | RESPIRATION RATE: 18 BRPM | HEART RATE: 80 BPM

## 2025-01-29 DIAGNOSIS — C61 PROSTATE CANCER (H): ICD-10-CM

## 2025-01-29 LAB — GLUCOSE BLDC GLUCOMTR-MCNC: 172 MG/DL (ref 70–99)

## 2025-01-29 PROCEDURE — 38505 NEEDLE BIOPSY LYMPH NODES: CPT

## 2025-01-29 PROCEDURE — 88342 IMHCHEM/IMCYTCHM 1ST ANTB: CPT | Mod: TC | Performed by: HOSPITALIST

## 2025-01-29 PROCEDURE — 82962 GLUCOSE BLOOD TEST: CPT

## 2025-01-29 PROCEDURE — 88341 IMHCHEM/IMCYTCHM EA ADD ANTB: CPT | Mod: TC | Performed by: HOSPITALIST

## 2025-01-29 PROCEDURE — 77012 CT SCAN FOR NEEDLE BIOPSY: CPT

## 2025-01-29 PROCEDURE — 250N000011 HC RX IP 250 OP 636: Performed by: RADIOLOGY

## 2025-01-29 RX ORDER — FENTANYL CITRATE 50 UG/ML
25-50 INJECTION, SOLUTION INTRAMUSCULAR; INTRAVENOUS EVERY 5 MIN PRN
Status: DISCONTINUED | OUTPATIENT
Start: 2025-01-29 | End: 2025-01-30 | Stop reason: HOSPADM

## 2025-01-29 RX ORDER — FLUMAZENIL 0.1 MG/ML
0.2 INJECTION, SOLUTION INTRAVENOUS
Status: DISCONTINUED | OUTPATIENT
Start: 2025-01-29 | End: 2025-01-30 | Stop reason: HOSPADM

## 2025-01-29 RX ORDER — NALOXONE HYDROCHLORIDE 0.4 MG/ML
0.2 INJECTION, SOLUTION INTRAMUSCULAR; INTRAVENOUS; SUBCUTANEOUS
Status: DISCONTINUED | OUTPATIENT
Start: 2025-01-29 | End: 2025-01-30 | Stop reason: HOSPADM

## 2025-01-29 RX ORDER — NALOXONE HYDROCHLORIDE 0.4 MG/ML
0.4 INJECTION, SOLUTION INTRAMUSCULAR; INTRAVENOUS; SUBCUTANEOUS
Status: DISCONTINUED | OUTPATIENT
Start: 2025-01-29 | End: 2025-01-30 | Stop reason: HOSPADM

## 2025-01-29 RX ADMIN — MIDAZOLAM HYDROCHLORIDE 1 MG: 1 INJECTION, SOLUTION INTRAMUSCULAR; INTRAVENOUS at 08:29

## 2025-01-29 RX ADMIN — FENTANYL CITRATE 50 MCG: 50 INJECTION INTRAMUSCULAR; INTRAVENOUS at 08:29

## 2025-01-29 RX ADMIN — FENTANYL CITRATE 50 MCG: 50 INJECTION INTRAMUSCULAR; INTRAVENOUS at 08:24

## 2025-01-29 RX ADMIN — MIDAZOLAM HYDROCHLORIDE 1 MG: 1 INJECTION, SOLUTION INTRAMUSCULAR; INTRAVENOUS at 08:24

## 2025-01-29 NOTE — DISCHARGE INSTRUCTIONS
1. You are required to have someone accompany you home. Do not drive or operate machinery today as the medication may cause sleepiness.    2. Rest today and avoid strenuous activity or heavy lifting for 48 hours. Over-activity may produce dizziness and or nausea.    3. You should follow your normal diet. Drink plenty of fluids. No alcoholic beverages for 24 hours. *(Alcohol may interact with the medications you received today)    4. Leave bandage on today, you may remove tomorrow.    5. You may shower tomorrow. Do not soak in a bath tub, hot tub, or swim until the site is completely healed and the skin glue is off. Keep the site clean and dry.    ADDITIONAL INSTRUCTIONS    When to call your Doctor:     1. Watch your biopsy site for signs of infection, increase pain, redness, swelling, or any drainage and or fever or chills.    2. On-going nausea, vomiting, or un-usual increase in pain.    3. If you experience any of the above or sudden weakness, dizziness, abdominal pain, flank pain or a temperature above 100.0 degree F for more than 24 hours, call your Doctor.    4. Sudden on-set of shortness of breath - call 911 or go to the emergency room.          * Recovery After Conscious Sedation (Adult)  We gave you medicine by vein to make you sleepy or relaxed during your procedure. This may have included both a pain medicine and sleeping medicine. Most of the effects have worn off. But you may still feel sleepy for the next 6 to 8 hours.  Home care  Follow these guidelines when you get home:  You may feel sleepy and clumsy and have poor balance for the next few hours.  A responsible adult should stay with you for the next 8 hours. This person should make sure your condition doesn t get worse.  Don't drink any alcohol for the next 24 hours.  Don't drive, operate dangerous machinery, make important business or personal decisions or sign legal documents during the next 24 hours.  You may vomit (throw up) if you eat too soon  after the procedure. If this happens, drink small amounts of water, juice or clear broth. Wait to try solid food until you no longer have nausea (upset stomach).  Note: Your care team may tell you not to take any medicine by mouth for pain or sleep in the next 4 hours. These medicines may react with the medicines you had in the hospital. This could cause a much stronger response than usual.  Follow-up care  Follow up with your care team if you are not alert and back to your usual level of activity within 12 hours.  When to seek medical advice  Call your care team right away if any of these occur:  You still feel sleepy or clumsy after 12 hours, or your sleepiness gets worse  Weakness or dizziness gets worse  Repeated vomiting  If you can't be woken up and someone is staying with you, they should call 911.  For informational purposes only. Not to replace the advice of your health care provider.  Copyright   2018 [employer]. All rights reserved.

## 2025-01-29 NOTE — PROGRESS NOTES
Patient Name: Kamille Gleason  Medical Record Number: [id number]  Today's Date: 1/29/2025    Procedure: Computed tomography guided left sided lymph node biopsy with moderate sedation   Proceduralist: Dr. Toure    Procedure Start: 0821  Procedure end: 0841  Sedation medications administered: 2 mg midazolam and 100 mcg fentanyl   Sedation time: 20 minutes    Report given to: N/A  : N/A    Other Notes: Pt arrived to IR room 1 from Pre/post bay 1. Consent reviewed. Pt denies any questions or concerns regarding procedure. Pt positioned supine and monitored per protocol. Pt tolerated procedure without any noted complications. VSS on monitor. Pt transferred back to Pre/post bay 1. Left pelvic site is soft clean/dry/intact. Discharge education explained to both patient and family.  Patient escorted via wheelchair to car, daughter to transport.

## 2025-01-30 ENCOUNTER — LAB (OUTPATIENT)
Dept: INFUSION THERAPY | Facility: HOSPITAL | Age: 80
End: 2025-01-30
Attending: INTERNAL MEDICINE
Payer: COMMERCIAL

## 2025-01-30 ENCOUNTER — ONCOLOGY VISIT (OUTPATIENT)
Dept: ONCOLOGY | Facility: HOSPITAL | Age: 80
End: 2025-01-30
Attending: INTERNAL MEDICINE
Payer: COMMERCIAL

## 2025-01-30 VITALS
WEIGHT: 163.5 LBS | HEIGHT: 66 IN | DIASTOLIC BLOOD PRESSURE: 69 MMHG | OXYGEN SATURATION: 96 % | SYSTOLIC BLOOD PRESSURE: 146 MMHG | RESPIRATION RATE: 16 BRPM | TEMPERATURE: 98.3 F | BODY MASS INDEX: 26.28 KG/M2 | HEART RATE: 85 BPM

## 2025-01-30 DIAGNOSIS — M84.48XA PATHOLOGICAL FRACTURE OF CERVICAL VERTEBRA, INITIAL ENCOUNTER: ICD-10-CM

## 2025-01-30 DIAGNOSIS — C79.51 PROSTATE CANCER METASTATIC TO BONE (H): ICD-10-CM

## 2025-01-30 DIAGNOSIS — C61 PROSTATE CANCER METASTATIC TO BONE (H): Primary | ICD-10-CM

## 2025-01-30 DIAGNOSIS — C61 PROSTATE CANCER METASTATIC TO BONE (H): ICD-10-CM

## 2025-01-30 DIAGNOSIS — C79.51 PROSTATE CANCER METASTATIC TO BONE (H): Primary | ICD-10-CM

## 2025-01-30 LAB
ALBUMIN SERPL BCG-MCNC: 4 G/DL (ref 3.5–5.2)
ALP SERPL-CCNC: 1540 U/L (ref 40–150)
ALT SERPL W P-5'-P-CCNC: 30 U/L (ref 0–70)
ANION GAP SERPL CALCULATED.3IONS-SCNC: 9 MMOL/L (ref 7–15)
AST SERPL W P-5'-P-CCNC: 47 U/L (ref 0–45)
BILIRUB SERPL-MCNC: 0.3 MG/DL
BUN SERPL-MCNC: 20.3 MG/DL (ref 8–23)
CALCIUM SERPL-MCNC: 9.8 MG/DL (ref 8.8–10.4)
CHLORIDE SERPL-SCNC: 98 MMOL/L (ref 98–107)
CREAT SERPL-MCNC: 0.86 MG/DL (ref 0.67–1.17)
EGFRCR SERPLBLD CKD-EPI 2021: 89 ML/MIN/1.73M2
GLUCOSE SERPL-MCNC: 152 MG/DL (ref 70–99)
HCO3 SERPL-SCNC: 29 MMOL/L (ref 22–29)
PATH REPORT.COMMENTS IMP SPEC: ABNORMAL
PATH REPORT.COMMENTS IMP SPEC: YES
PATH REPORT.FINAL DX SPEC: ABNORMAL
PATH REPORT.GROSS SPEC: ABNORMAL
PATH REPORT.MICROSCOPIC SPEC OTHER STN: ABNORMAL
PATH REPORT.RELEVANT HX SPEC: ABNORMAL
PHOTO IMAGE: ABNORMAL
POTASSIUM SERPL-SCNC: 5.2 MMOL/L (ref 3.4–5.3)
PROT SERPL-MCNC: 7.4 G/DL (ref 6.4–8.3)
PSA SERPL DL<=0.01 NG/ML-MCNC: 621 NG/ML (ref 0–6.5)
SHBG SERPL-SCNC: 49 NMOL/L (ref 11–80)
SODIUM SERPL-SCNC: 136 MMOL/L (ref 135–145)

## 2025-01-30 PROCEDURE — 84270 ASSAY OF SEX HORMONE GLOBUL: CPT

## 2025-01-30 PROCEDURE — 84153 ASSAY OF PSA TOTAL: CPT

## 2025-01-30 PROCEDURE — 82310 ASSAY OF CALCIUM: CPT

## 2025-01-30 PROCEDURE — 84460 ALANINE AMINO (ALT) (SGPT): CPT

## 2025-01-30 PROCEDURE — 36415 COLL VENOUS BLD VENIPUNCTURE: CPT

## 2025-01-30 PROCEDURE — G0463 HOSPITAL OUTPT CLINIC VISIT: HCPCS | Performed by: INTERNAL MEDICINE

## 2025-01-30 RX ORDER — HEPARIN SODIUM,PORCINE 10 UNIT/ML
5-20 VIAL (ML) INTRAVENOUS DAILY PRN
OUTPATIENT
Start: 2025-01-31

## 2025-01-30 RX ORDER — ZOLEDRONIC ACID 0.04 MG/ML
4 INJECTION, SOLUTION INTRAVENOUS ONCE
OUTPATIENT
Start: 2025-01-31 | End: 2025-01-30

## 2025-01-30 RX ORDER — HEPARIN SODIUM (PORCINE) LOCK FLUSH IV SOLN 100 UNIT/ML 100 UNIT/ML
5 SOLUTION INTRAVENOUS
OUTPATIENT
Start: 2025-01-31

## 2025-01-30 RX ORDER — OXYCODONE HYDROCHLORIDE 5 MG/1
5-10 TABLET ORAL EVERY 4 HOURS PRN
Qty: 60 TABLET | Refills: 0 | Status: SHIPPED | OUTPATIENT
Start: 2025-01-30

## 2025-01-30 ASSESSMENT — PAIN SCALES - GENERAL: PAINLEVEL_OUTOF10: MODERATE PAIN (5)

## 2025-01-30 NOTE — LETTER
"1/30/2025      Kamille Gleason  [address] [facility] [address]      Dear Colleague,    Thank you for referring your patient, Kamille Gleason, to the [facility] [place]. Please see a copy of my visit note below.    Oncology Rooming Note    January 30, 2025 9:32 AM   Kamille Gleason is a 79 year old male who presents for:    Chief Complaint   Patient presents with     Oncology Clinic Visit     Prostate cancer metastatic to bone (H)     Initial Vitals: BP (!) 146/69   Pulse 85   Temp 98.3  F (36.8  C)   Resp 16   Ht 1.683 m (5' 6.25\")   Wt 74.2 kg (163 lb 8 oz)   SpO2 96%   BMI 26.19 kg/m   Estimated body mass index is 26.19 kg/m  as calculated from the following:    Height as of this encounter: 1.683 m (5' 6.25\").    Weight as of this encounter: 74.2 kg (163 lb 8 oz). Body surface area is 1.86 meters squared.  Moderate Pain (5) Comment: Data Unavailable   No LMP for male patient.  Allergies reviewed: Yes  Medications reviewed: Yes    Medications: MEDICATION REFILLS NEEDED TODAY. Provider was notified.  Pharmacy name entered into [employer]: [id number] [address] AT [facility] OF [facility]    Frailty Screening:   Is the patient here for a new oncology consult visit in cancer care? 2. No      Clinical concerns: Course of action      Mariela Maruer LPN               [facility] Hematology and Oncology Progress Note    Patient: Kamille Gleason  MRN: [id number]  Date of Service: Jan 30, 2025           Reason for visit         Problem List Items Addressed This Visit          Musculoskeletal and Integumentary    Pathological fracture of cervical vertebra, initial encounter    Relevant Medications    oxyCODONE (ROXICODONE) 5 MG tablet    Other Relevant Orders    Infusion Appointment Request - Adult    Comprehensive metabolic panel    CBC with platelets    PSA, tumor marker    Prostate cancer metastatic to bone (H) - Primary    Relevant " Medications    oxyCODONE (ROXICODONE) 5 MG tablet    Other Relevant Orders    Infusion Appointment Request - Adult    Infusion Appointment Request - Adult    Infusion Appointment Request - Adult    Infusion Appointment Request - Adult    Infusion Appointment Request - Adult    Comprehensive metabolic panel    CBC with platelets    PSA, tumor marker    PSA, tumor marker    Testosterone Free and Total    Comprehensive metabolic panel (Completed)         Assessment AND Problems Addressed      A very pleasant 79 year old gentleman with metastatic prostate cancer.  PSMA PET scan is showing widespread metastatic disease involving multiple bones, pelvic, retroperitoneal and mediastinal lymph nodes.  He just had a biopsy.  Path is pending.  Nondisplaced fracture of the C7 vertebral body lamina and facets.  Diffuse sclerotic lesions in multiple bones.  Iliac lymphadenopathy left more than the right.  Other medical conditions stable.  Having issues with constipation secondary to pain medication.      Plan      Discussion with the patient and his family.  Discussed with them that typically patients are treated with androgen deprivation therapy unless they have a very high risk disease which is a high Cayla score of 9 etc.  He is 79 years old.  I do not think he is going to handle docetaxel very well upfront.  We are going to start with androgen deprivation therapy consisting of Firmagon with a loading dose and then maintenance dose after that.  Along with that we will start him on abiraterone 1000 mg p.o. daily along with prednisone 5 mg p.o. twice daily.  We are also going to start him on Zometa for his bone health.  Give them detailed information of about the side effects he will experience from this therapy.  Discussed with the patient and his family that if his pain does not get better or actually if it gets worse then we may have to request radiation oncology consultation to radiate those spots.  I am hoping that the  Firmagon therapy can quickly reduce the tumor burden and reduce his symptom burden.  Once we have the pathology report we will send it for molecular testing as well.  We can get a baseline PSA and testosterone level today and then we will follow him along with that.  Continue with good diet and exercise.  Advised to use a neck collar all the time.  The longitudinal plan of care for the diagnosis(es)/condition(s) as documented were addressed during this visit. Due to the added complexity in care, I will continue to support JANIS in the subsequent management and with ongoing continuity of care.    Medical decision making      Extremely complex.  Patient presenting with advanced malignancy with significant pain.  Reviewed notes from each unique source.  Reviewed each unique test.    Ordered tests.    Independently interpreted lab tests and radiological exams performed by other physicians.  Personally reviewed the images of the PSMA PET scan.  Independent historian account obtained from his wife  and daughters.      Risk      Risk of morbidity mortality.  Significant risk of side effects from intervention.       Cancer Staging   No matching staging information was found for the patient.          History of present illness        Mr. Kamille Gleason is a very pleasant 79 year old gentleman who has been diagnosed with metastatic prostate cancer.  He presented on 16 January 2025 to the emergency room with some acute on chronic back pain.  X-ray showed some sclerotic lesion which led to a CT scan of the neck chest abdomen and pelvis.  CT scan showed bilateral nondisplaced C7 facet and lamina fractures and irregularly sclerotic C7 vertebral body.  Thoracic spine showed subtle sclerotic foci in multiple vertebral bodies as did the lumbar spine.  The abdominal CT also showed bilateral iliac left more than the right lymphadenopathy.  He was admitted in the hospital.  He had a PSA done which was over 500.    The patient was  "admitted was seen by neurosurgery and other consultants.  Discharged on some pain medication.    Comes in today to discuss his next course of action.  He had an order for a CT-guided biopsy of the iliac lymph node but that has not been done yet.      Review of system      Details noted in the history of present illness.  A detailed review of systems is otherwise negative.      Physical exam        BP (!) 146/69   Pulse 85   Temp 98.3  F (36.8  C)   Resp 16   Ht 1.683 m (5' 6.25\")   Wt 74.2 kg (163 lb 8 oz)   SpO2 96%   BMI 26.19 kg/m      GENERAL: No acute distress. Cooperative in conversation.   HEENT:  Pupils are equal, round and reactive. Oral mucosa is clean and intact. No ulcerations or mucositis noted. No bleeding noted.  RESP:Chest symmetric lungs are clear bilaterally per auscultation. Regular respiratory rate. No wheezes or rhonchi.  CV: Normal S1 S2 Regular, rate and rhythm.     ABD: Nondistended, soft, nontender. Positive bowel sounds. No organomegaly.   EXTREMITIES: No lower extremity edema.   NEURO: Non- focal. Alert and oriented x3.  Cranial nerves appear intact.  PSYCH: Within normal limits. No depression or anxiety.  SKIN: Warm dry intact.      Lab results Reviewed      Recent Results (from the past week)   Glucose by meter   Result Value Ref Range    GLUCOSE BY METER POCT 172 (H) 70 - 99 mg/dL   Comprehensive metabolic panel   Result Value Ref Range    Sodium 136 135 - 145 mmol/L    Potassium 5.2 3.4 - 5.3 mmol/L    Carbon Dioxide (CO2) 29 22 - 29 mmol/L    Anion Gap 9 7 - 15 mmol/L    Urea Nitrogen 20.3 8.0 - 23.0 mg/dL    Creatinine 0.86 0.67 - 1.17 mg/dL    GFR Estimate 89 >60 mL/min/1.73m2    Calcium 9.8 8.8 - 10.4 mg/dL    Chloride 98 98 - 107 mmol/L    Glucose 152 (H) 70 - 99 mg/dL    Alkaline Phosphatase 1,540 (H) 40 - 150 U/L    AST 47 (H) 0 - 45 U/L    ALT 30 0 - 70 U/L    Protein Total 7.4 6.4 - 8.3 g/dL    Albumin 4.0 3.5 - 5.2 g/dL    Bilirubin Total 0.3 <=1.2 mg/dL       Imaging " results Reviewed        CT Lymph Node Biopsy    Result Date: 1/29/2025  EXAM: 1. PERCUTANEOUS BIOPSY LEFT PELVIC NODE 2. CT GUIDANCE 3. CONSCIOUS SEDATION LOCATION: [facility] DATE: 1/29/2025 INDICATION: pelvic lymphadenopathy TECHNIQUE: Dose reduction techniques were used. PROCEDURE: Informed consent obtained. Site marked. Prior images reviewed. Required items made available. Patient identity confirmed verbally and with arm band. Patient reevaluated immediately before administering sedation. Universal protocol was followed. Time out performed. The site was prepped and draped in sterile fashion. 10 mL of 1% lidocaine was infused into the local soft tissues. Using standard technique and under direct CT guidance, a 18-gauge biopsy device was used to obtain three core  biopsies. Tissue was submitted to Pathology and was adequate by preliminary review by a pathologist. The patient tolerated the procedure well. No immediate complications. SEDATION: Versed 2 mg. Fentanyl 100 mcg. The procedure was performed with administration intravenous conscious sedation with appropriate preoperative, intraoperative, and postoperative evaluation. 20 minutes of supervised face to face conscious sedation time was provided by a radiology nurse under my direct supervision.     IMPRESSION: 1.  Successful CT-guided biopsy left pelvic node. Reference CPT Codes: 18910, 66764, 77835    PET PSMA Eyes to Thighs    Result Date: 1/28/2025  EXAM: PET PSMA EYES TO THIGHS, Ga-68 PSMA LOCATION: [facility] DATE: 1/28/2025 INDICATION: Prostate cancer, staging. Malignant neoplasm of the prostate. PSMA PET/CT scan performed as per NCCN guidelines in the setting of high risk prostate cancer. Initial treatment strategy. COMPARISON: CT abdomen pelvis 1/20/2025, MRI spine 1/16/2025 reviewed. TECHNIQUE: 60 minutes post intravenous administration of 5.95mCi Ga68 Illuccix, PET imaging was performed from the skull  vertex to mid thigh utilizing attenuation correction with concurrent axial CT and PET/CT image fusion. Dose reduction techniques were  used. FINDINGS: Radiotracer avid mass centered in the right posterior aspect of the prostate measures approximately 3.9 x 4.5 x 3.9 cm, consistent with primary prostate malignancy. Numerous enlarged radiotracer avid lymph nodes involve several sites above and below the diaphragm, including left pelvic sidewall, left external iliac, bilateral common iliac, left para-aortic, aortocaval, scattered mediastinal and bilateral hilar sites. Additionally, innumerable sclerotic predominant osseous metastases are present involving scattered skeletal sites, including numerous sites in the spine, both scapulae, several ribs, numerous sites in the bony pelvis and proximal right femur. Some of the spinal metastases demonstrate some surrounding extraosseous extension.  Some misregistration is present between PET and CT images in some areas. A couple of subcentimeter pulmonary nodules in the lingula and posterolateral right upper lobe demonstrate mild radiotracer activity, suspicious for pulmonary metastases. No suspicious focal uptake in the liver. Mild senescent intracranial changes. Sternotomy with CABG. Moderate atherosclerotic calcifications. Mild linear scarring or atelectasis in the lower lungs. Few small benign right renal cysts, no follow-up needed. Few colonic diverticula. Mild thoracolumbar curve. Mild scattered hypertrophic degenerative changes in the spine. Postoperative changes lower lumbar spine with hardware.     IMPRESSION: Findings suspicious for primary prostate malignancy with metastases involving several lymph node sites above and below the diaphragm, both lungs and numerous scattered sites in the skeleton.    Abd/pelvis CT no contrast - Stone Protocol    Result Date: 1/21/2025  EXAM: CT ABDOMEN PELVIS W/O CONTRAST LOCATION: [facility] DATE: 1/21/2025  INDICATION: dilated loops on XR today vs CT yesterday COMPARISON: CT 1/19/2025 TECHNIQUE: CT scan of the abdomen and pelvis was performed without IV contrast. Multiplanar reformats were obtained. Dose reduction techniques were used. CONTRAST: None. FINDINGS: LOWER CHEST: Stable pulmonary nodules. HEPATOBILIARY: Normal. PANCREAS: Normal. SPLEEN: Normal. ADRENAL GLANDS: Normal. KIDNEYS/BLADDER: Redemonstrated renal cysts. BOWEL: No obstruction or inflammatory change. LYMPH NODES: Unchanged retroperitoneal and pelvic lymphadenopathy. VASCULATURE: No abdominal aortic aneurysm. PELVIC ORGANS: Prostatomegaly MUSCULOSKELETAL: Redemonstrated scattered sclerotic osseous lesions. Lower lumbar spinal fusion hardware.     IMPRESSION: 1.  No bowel obstruction. 2.  Stable lymphadenopathy and sclerotic osseous metastases.     XR Abdomen 2 Views    Result Date: 1/20/2025  EXAM: XR ABDOMEN 2 VIEWS LOCATION: [facility] DATE: 1/20/2025 INDICATION: Abdominal pain. COMPARISON: None available.     IMPRESSION: No intraperitoneal free air. There are several mildly dilated, gas-filled loops of small bowel in the central abdomen. Findings could be attributable to an underlying ileus or small bowel obstruction. Correlation with CT imaging is recommended. Median sternotomy and coronary artery bypass grafting. Postsurgical changes of the lower lumbar spine.    CT Abdomen Pelvis w Contrast    Result Date: 1/19/2025  EXAM: CT ABDOMEN PELVIS W CONTRAST LOCATION: [facility] DATE: 1/19/2025 INDICATION: diffuse abdominal pain, no BM x 2 days COMPARISON: 01/16/2025 TECHNIQUE: CT scan of the abdomen and pelvis was performed following injection of IV contrast. Multiplanar reformats were obtained. Dose reduction techniques were used. CONTRAST: ISOVUE 370 70ML FINDINGS: LOWER CHEST: 9 mm subpleural nodule lingula as 2, 2 and 5 mm subpleural right middle lobe nodule image 2 unchanged. Mild basilar  atelectasis. HEPATOBILIARY: Slight gallbladder distention. PANCREAS: Normal. SPLEEN: Normal. ADRENAL GLANDS: Normal. KIDNEYS/BLADDER: Right renal cyst. Bilateral subcentimeter renal hypodensities small for characterization. BOWEL: Normal caliber. Normal appendix. LYMPH NODES: Retroperitoneal and pelvic large or nodes is a left external iliac node 2.3 cm S3, 176. Adenopathy. VASCULATURE: Atherosclerotic vascular calcification. PELVIC ORGANS: Prostate is enlarged and heterogeneous. MUSCULOSKELETAL: Postsurgical change lumbar spine. Degenerative change osseous structures. Scattered osseous sclerotic lesions in the pelvis are again seen.     IMPRESSION: 1.  Retroperitoneal and pelvic adenopathy again seen. 2.  Sclerotic lesions within the pelvis presumably metastatic. 3.  Prominent, heterogeneous prostate again seen. PSA correlation recommended. 4.  Basilar pulmonary nodules unchanged.    XR Cervical Spine 2/3 Views    Result Date: 1/17/2025  EXAM: XR CERVICAL SPINE 2/3 VIEWS LOCATION: [facility] DATE: 1/17/2025 INDICATION: C7 pathologic compression fracture in collar, note alignment to compare to outpatient xrays COMPARISON: 1/16/2025 CT/MRI scan.     IMPRESSION: The marrow invasive process at the C7 vertebrae is not visualized on plain film x-ray. Shoulder artifact obscures the C7-T1 level on the lateral view. The remaining levels otherwise demonstrate DDD change including loss of disc height at C3-4, C4-5, C5-C6 and C6-C7. No prevertebral soft tissue swelling.     MR Cervical Spine w/o & w Contrast    Result Date: 1/16/2025  EXAM: MR CERVICAL SPINE W/O and W CONTRAST, MR LUMBAR SPINE W/O and W CONTRAST, MR THORACIC SPINE W/O and W CONTRAST LOCATION: [facility] DATE: 1/16/2025 INDICATION: pathologic fracture C7, further characterization COMPARISON: None. CONTRAST: 8 mL Gadavist TECHNIQUE: 1) MRI Cervical Spine without and with IV contrast. 2) MRI Thoracic Spine without  and with IV contrast. 3) MRI Lumbar Spine without and with IV contrast. FINDINGS: CERVICAL SPINE: Suboptimal study due to patient motion artifact. T1 hypointense, T2 and STIR hyperintense signal changes with associated contrast enhancement in multiple cervical vertebral bodies and posterior elements. Mild height loss at C7, can represent pathologic fracture. There is retropulsion of the C7 vertebral body with questionable extraosseous extension. There is evidence of extraosseous extension in C7 spinous process. Prominent dorsal epidural T2 hyperintense signal and enhancement extending from it C6 down to T2. No abnormal spinal cord signal. Multilevel disc height loss, osteophyte formation and facet arthropathy. Craniovertebral junction and C1-C2: Normal. C2-C3: Normal disc height. No herniation. Bilateral facet arthropathy. No spinal canal or neural foraminal stenosis. C3-C4: Mild disc height loss and disc degeneration. Right eccentric disc osteophyte complex and bilateral facet arthropathy moderate to severe right and moderate left neural foraminal stenosis. Mild to moderate spinal canal stenosis. C4-C5: Mild to moderate disc height loss. Disc osteophyte complex and left greater than right facet arthropathy. Mild to moderate bilateral neural foraminal stenosis. No spinal canal stenosis. C5-C6: Mild disc height loss and disc degeneration. Disc osteophyte complex and superimposed central protrusion. Bilateral facet arthropathy. Severe left and moderate to severe right neural foraminal stenosis. Mild spinal canal stenosis C6-C7: Mild disc height loss and disc degeneration. Disc osteophyte complex and bilateral uncinate spurring. Bilateral facet arthropathy severe bilateral neural foraminal stenosis. Moderate spinal canal stenosis. C7-T1: Mild disc height loss and disc degeneration. Bilateral facet arthropathy. Extraosseous extension into bilateral neural foramina results in severe bilateral neural foraminal stenosis.  Mild spinal canal stenosis. THORACIC SPINE: Suboptimal study due to patient motion artifact. T1 hypointense, T2 and STIR hyperintense signal changes with associated contrast enhancement in multiple vertebral bodies and posterior elements, most prominent in T1-T3 vertebral bodies and posterior elements. No pathologic compression fracture. No evidence of extraosseous extension. Multilevel disc height loss, disc degeneration, osteophyte formation and facet arthropathy. Moderate spinal canal stenosis at T10-11, mild to moderate spinal canal stenosis at T9-10 and mild spinal canal stenosis at T8-9. Severe bilateral neural foraminal stenosis at T1-T2 and T10-11. Multilevel mixed Modic type I and type II endplate changes. No abnormal spinal cord signal. No extraspinal abnormality. LUMBAR SPINE: Suboptimal study due to patient motion artifact and susceptibility artifact from fusion hardware. Nomenclature is based on 5 lumbar type vertebral bodies. Postsurgical changes of instrumented anterior and posterior spinal fusion at L4-5. T1 hypointense, T2 and STIR hyperintense signal changes with associated involvement in the lumbosacral vertebral bodies and posterior elements. No significant height loss to suggest pathology compression fracture. Question ventral epidural enhancing soft  tissue at L4 and L5 levels, may represent extraosseous extension. Although difficult to evaluate, there appears to be severe spinal canal stenosis at L4 and L5 levels. Multilevel disc height loss, osteophyte formation and facet arthropathy. Mild spinal canal stenosis at L1-2 and mild to moderate spinal canal stenosis at L2-3. Prominent epidural fat contributes to severe spinal canal stenosis at L5-S1. Bilateral renal cysts. The tip of the conus medullaris likely at L2 level. Cauda equina nerve roots cannot be well evaluated.     IMPRESSION: CERVICAL SPINE MRI: 1.  Suboptimal study due to patient motion artifact. 2.  T1 hypointense, T2 and STIR  hyperintense signal changes with associated contrast enhancement in cervical vertebral bodies and posterior elements, most pronounced between C4 and C7 levels. 3.  Mild vertebral body height loss at C7, may represent pathology compression fracture. Evidence of extraosseous extension at C7, in particular in the spinous process and inferior vertebral body. Resultant severe bilateral neural foraminal stenosis at C7-T1. Posterior epidural T2 hyperintense signal and enhancement extending from C6 down to T2 may represent external stenosis tumoral extension into epidural space. 4.  No definite abnormal spinal cord signal. 5.  Multilevel cervical spondylosis as detailed above. THORACIC SPINE MRI: 1.  Suboptimal study due to patient motion artifact. 2.  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 No significant vertebral body height loss to suggest pathologic compression fracture. No evidence of extraosseous extension. 3.  Multilevel thoracic spondylosis as detailed above in the body of the report. LUMBAR SPINE MRI: 1.  Suboptimal study due to patient motion artifact and susceptibility artifact from fusion hardware. 2.  Diffuse osseous metastatic disease involving the lumbosacral vertebral bodies and posterior elements, most pronounced in L3-L5 levels. No significant vertebral body height loss to suggest pathologic compression fracture. Question retropulsion of L4 and L5 vertebral bodies with possible enhancing soft tissue in the anterior epidural space, suggestive of extraosseous extension. Resultant right to severe spinal canal stenosis at these levels. 3.  No evidence of extraosseous extension elsewhere in the lumbosacral spine. 4.  Multilevel lumbar spondylosis in the remaining lumbar spine as detailed in the body of the report.    MR Thoracic Spine w/o & w Contrast    Result Date: 1/16/2025  EXAM: MR CERVICAL SPINE W/O and W CONTRAST, MR LUMBAR SPINE W/O and W CONTRAST, MR  THORACIC SPINE W/O and W CONTRAST LOCATION: [facility] DATE: 1/16/2025 INDICATION: pathologic fracture C7, further characterization COMPARISON: None. CONTRAST: 8 mL Gadavist TECHNIQUE: 1) MRI Cervical Spine without and with IV contrast. 2) MRI Thoracic Spine without and with IV contrast. 3) MRI Lumbar Spine without and with IV contrast. FINDINGS: CERVICAL SPINE: Suboptimal study due to patient motion artifact. T1 hypointense, T2 and STIR hyperintense signal changes with associated contrast enhancement in multiple cervical vertebral bodies and posterior elements. Mild height loss at C7, can represent pathologic fracture. There is retropulsion of the C7 vertebral body with questionable extraosseous extension. There is evidence of extraosseous extension in C7 spinous process. Prominent dorsal epidural T2 hyperintense signal and enhancement extending from it C6 down to T2. No abnormal spinal cord signal. Multilevel disc height loss, osteophyte formation and facet arthropathy. Craniovertebral junction and C1-C2: Normal. C2-C3: Normal disc height. No herniation. Bilateral facet arthropathy. No spinal canal or neural foraminal stenosis. C3-C4: Mild disc height loss and disc degeneration. Right eccentric disc osteophyte complex and bilateral facet arthropathy moderate to severe right and moderate left neural foraminal stenosis. Mild to moderate spinal canal stenosis. C4-C5: Mild to moderate disc height loss. Disc osteophyte complex and left greater than right facet arthropathy. Mild to moderate bilateral neural foraminal stenosis. No spinal canal stenosis. C5-C6: Mild disc height loss and disc degeneration. Disc osteophyte complex and superimposed central protrusion. Bilateral facet arthropathy. Severe left and moderate to severe right neural foraminal stenosis. Mild spinal canal stenosis C6-C7: Mild disc height loss and disc degeneration. Disc osteophyte complex and bilateral uncinate spurring.  Bilateral facet arthropathy severe bilateral neural foraminal stenosis. Moderate spinal canal stenosis. C7-T1: Mild disc height loss and disc degeneration. Bilateral facet arthropathy. Extraosseous extension into bilateral neural foramina results in severe bilateral neural foraminal stenosis. Mild spinal canal stenosis. THORACIC SPINE: Suboptimal study due to patient motion artifact. T1 hypointense, T2 and STIR hyperintense signal changes with associated contrast enhancement in multiple vertebral bodies and posterior elements, most prominent in T1-T3 vertebral bodies and posterior elements. No pathologic compression fracture. No evidence of extraosseous extension. Multilevel disc height loss, disc degeneration, osteophyte formation and facet arthropathy. Moderate spinal canal stenosis at T10-11, mild to moderate spinal canal stenosis at T9-10 and mild spinal canal stenosis at T8-9. Severe bilateral neural foraminal stenosis at T1-T2 and T10-11. Multilevel mixed Modic type I and type II endplate changes. No abnormal spinal cord signal. No extraspinal abnormality. LUMBAR SPINE: Suboptimal study due to patient motion artifact and susceptibility artifact from fusion hardware. Nomenclature is based on 5 lumbar type vertebral bodies. Postsurgical changes of instrumented anterior and posterior spinal fusion at L4-5. T1 hypointense, T2 and STIR hyperintense signal changes with associated involvement in the lumbosacral vertebral bodies and posterior elements. No significant height loss to suggest pathology compression fracture. Question ventral epidural enhancing soft  tissue at L4 and L5 levels, may represent extraosseous extension. Although difficult to evaluate, there appears to be severe spinal canal stenosis at L4 and L5 levels. Multilevel disc height loss, osteophyte formation and facet arthropathy. Mild spinal canal stenosis at L1-2 and mild to moderate spinal canal stenosis at L2-3. Prominent epidural fat contributes  to severe spinal canal stenosis at L5-S1. Bilateral renal cysts. The tip of the conus medullaris likely at L2 level. Cauda equina nerve roots cannot be well evaluated.     IMPRESSION: CERVICAL SPINE MRI: 1.  Suboptimal study due to patient motion artifact. 2.  T1 hypointense, T2 and STIR hyperintense signal changes with associated contrast enhancement in cervical vertebral bodies and posterior elements, most pronounced between C4 and C7 levels. 3.  Mild vertebral body height loss at C7, may represent pathology compression fracture. Evidence of extraosseous extension at C7, in particular in the spinous process and inferior vertebral body. Resultant severe bilateral neural foraminal stenosis at C7-T1. Posterior epidural T2 hyperintense signal and enhancement extending from C6 down to T2 may represent external stenosis tumoral extension into epidural space. 4.  No definite abnormal spinal cord signal. 5.  Multilevel cervical spondylosis as detailed above. THORACIC SPINE MRI: 1.  Suboptimal study due to patient motion artifact. 2.  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 No significant vertebral body height loss to suggest pathologic compression fracture. No evidence of extraosseous extension. 3.  Multilevel thoracic spondylosis as detailed above in the body of the report. LUMBAR SPINE MRI: 1.  Suboptimal study due to patient motion artifact and susceptibility artifact from fusion hardware. 2.  Diffuse osseous metastatic disease involving the lumbosacral vertebral bodies and posterior elements, most pronounced in L3-L5 levels. No significant vertebral body height loss to suggest pathologic compression fracture. Question retropulsion of L4 and L5 vertebral bodies with possible enhancing soft tissue in the anterior epidural space, suggestive of extraosseous extension. Resultant right to severe spinal canal stenosis at these levels. 3.  No evidence of extraosseous  extension elsewhere in the lumbosacral spine. 4.  Multilevel lumbar spondylosis in the remaining lumbar spine as detailed in the body of the report.    MR Lumbar Spine w/o & w Contrast    Result Date: 1/16/2025  EXAM: MR CERVICAL SPINE W/O and W CONTRAST, MR LUMBAR SPINE W/O and W CONTRAST, MR THORACIC SPINE W/O and W CONTRAST LOCATION: [facility] DATE: 1/16/2025 INDICATION: pathologic fracture C7, further characterization COMPARISON: None. CONTRAST: 8 mL Gadavist TECHNIQUE: 1) MRI Cervical Spine without and with IV contrast. 2) MRI Thoracic Spine without and with IV contrast. 3) MRI Lumbar Spine without and with IV contrast. FINDINGS: CERVICAL SPINE: Suboptimal study due to patient motion artifact. T1 hypointense, T2 and STIR hyperintense signal changes with associated contrast enhancement in multiple cervical vertebral bodies and posterior elements. Mild height loss at C7, can represent pathologic fracture. There is retropulsion of the C7 vertebral body with questionable extraosseous extension. There is evidence of extraosseous extension in C7 spinous process. Prominent dorsal epidural T2 hyperintense signal and enhancement extending from it C6 down to T2. No abnormal spinal cord signal. Multilevel disc height loss, osteophyte formation and facet arthropathy. Craniovertebral junction and C1-C2: Normal. C2-C3: Normal disc height. No herniation. Bilateral facet arthropathy. No spinal canal or neural foraminal stenosis. C3-C4: Mild disc height loss and disc degeneration. Right eccentric disc osteophyte complex and bilateral facet arthropathy moderate to severe right and moderate left neural foraminal stenosis. Mild to moderate spinal canal stenosis. C4-C5: Mild to moderate disc height loss. Disc osteophyte complex and left greater than right facet arthropathy. Mild to moderate bilateral neural foraminal stenosis. No spinal canal stenosis. C5-C6: Mild disc height loss and disc degeneration.  Disc osteophyte complex and superimposed central protrusion. Bilateral facet arthropathy. Severe left and moderate to severe right neural foraminal stenosis. Mild spinal canal stenosis C6-C7: Mild disc height loss and disc degeneration. Disc osteophyte complex and bilateral uncinate spurring. Bilateral facet arthropathy severe bilateral neural foraminal stenosis. Moderate spinal canal stenosis. C7-T1: Mild disc height loss and disc degeneration. Bilateral facet arthropathy. Extraosseous extension into bilateral neural foramina results in severe bilateral neural foraminal stenosis. Mild spinal canal stenosis. THORACIC SPINE: Suboptimal study due to patient motion artifact. T1 hypointense, T2 and STIR hyperintense signal changes with associated contrast enhancement in multiple vertebral bodies and posterior elements, most prominent in T1-T3 vertebral bodies and posterior elements. No pathologic compression fracture. No evidence of extraosseous extension. Multilevel disc height loss, disc degeneration, osteophyte formation and facet arthropathy. Moderate spinal canal stenosis at T10-11, mild to moderate spinal canal stenosis at T9-10 and mild spinal canal stenosis at T8-9. Severe bilateral neural foraminal stenosis at T1-T2 and T10-11. Multilevel mixed Modic type I and type II endplate changes. No abnormal spinal cord signal. No extraspinal abnormality. LUMBAR SPINE: Suboptimal study due to patient motion artifact and susceptibility artifact from fusion hardware. Nomenclature is based on 5 lumbar type vertebral bodies. Postsurgical changes of instrumented anterior and posterior spinal fusion at L4-5. T1 hypointense, T2 and STIR hyperintense signal changes with associated involvement in the lumbosacral vertebral bodies and posterior elements. No significant height loss to suggest pathology compression fracture. Question ventral epidural enhancing soft  tissue at L4 and L5 levels, may represent extraosseous extension.  Although difficult to evaluate, there appears to be severe spinal canal stenosis at L4 and L5 levels. Multilevel disc height loss, osteophyte formation and facet arthropathy. Mild spinal canal stenosis at L1-2 and mild to moderate spinal canal stenosis at L2-3. Prominent epidural fat contributes to severe spinal canal stenosis at L5-S1. Bilateral renal cysts. The tip of the conus medullaris likely at L2 level. Cauda equina nerve roots cannot be well evaluated.     IMPRESSION: CERVICAL SPINE MRI: 1.  Suboptimal study due to patient motion artifact. 2.  T1 hypointense, T2 and STIR hyperintense signal changes with associated contrast enhancement in cervical vertebral bodies and posterior elements, most pronounced between C4 and C7 levels. 3.  Mild vertebral body height loss at C7, may represent pathology compression fracture. Evidence of extraosseous extension at C7, in particular in the spinous process and inferior vertebral body. Resultant severe bilateral neural foraminal stenosis at C7-T1. Posterior epidural T2 hyperintense signal and enhancement extending from C6 down to T2 may represent external stenosis tumoral extension into epidural space. 4.  No definite abnormal spinal cord signal. 5.  Multilevel cervical spondylosis as detailed above. THORACIC SPINE MRI: 1.  Suboptimal study due to patient motion artifact. 2.  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 No significant vertebral body height loss to suggest pathologic compression fracture. No evidence of extraosseous extension. 3.  Multilevel thoracic spondylosis as detailed above in the body of the report. LUMBAR SPINE MRI: 1.  Suboptimal study due to patient motion artifact and susceptibility artifact from fusion hardware. 2.  Diffuse osseous metastatic disease involving the lumbosacral vertebral bodies and posterior elements, most pronounced in L3-L5 levels. No significant vertebral body height loss to  suggest pathologic compression fracture. Question retropulsion of L4 and L5 vertebral bodies with possible enhancing soft tissue in the anterior epidural space, suggestive of extraosseous extension. Resultant right to severe spinal canal stenosis at these levels. 3.  No evidence of extraosseous extension elsewhere in the lumbosacral spine. 4.  Multilevel lumbar spondylosis in the remaining lumbar spine as detailed in the body of the report.    MR Brain w/o & w Contrast    Result Date: 1/16/2025  EXAM: MR BRAIN W/O and W CONTRAST LOCATION: [facility] DATE: 1/16/2025 INDICATION: eval for malignancy COMPARISON: Brain MRI 4/9/2020. CONTRAST: 8 mL Gadavist TECHNIQUE: Routine multiplanar multisequence head MRI without and with intravenous contrast. FINDINGS: INTRACRANIAL CONTENTS: No acute or subacute infarct. No mass, acute hemorrhage, or extra-axial fluid collections. Scattered nonspecific T2/FLAIR hyperintensities within the cerebral white matter most consistent with mild chronic microvascular ischemic change. Mild to moderate generalized cerebral volume loss. Normal ventricles and sulci. Normal position of the cerebellar tonsils. No pathologic contrast enhancement. SELLA: No abnormality accounting for technique. OSSEOUS STRUCTURES/SOFT TISSUES: Normal marrow signal. The major intracranial vascular flow voids are maintained. ORBITS: Prior bilateral cataract surgery. Visualized portions of the orbits are otherwise unremarkable. SINUSES/MASTOIDS: No paranasal sinus mucosal disease. No middle ear or mastoid effusion.     IMPRESSION: 1.  No acute intracranial process. 2.  No abnormal intracranial enhancement to suggest intracranial malignancy. 3.  Generalized brain atrophy and presumed microvascular ischemic changes as detailed above.     CT Chest/Abdomen/Pelvis w Contrast    Result Date: 1/16/2025  EXAM: CT CHEST/ABDOMEN/PELVIS W CONTRAST LOCATION: [facility] DATE:  1/16/2025 INDICATION: Unintended weight loss over last multiple months generalized fatigue weakness and back pain malignancy eval COMPARISON: None. TECHNIQUE: CT scan of the chest, abdomen, and pelvis was performed following injection of IV contrast. A delayed phase of the abdomen was performed. Multiplanar reformats were obtained. Dose reduction techniques were used. CONTRAST: 83ml isovue 370 FINDINGS: LUNGS AND PLEURA: Mild bibasilar atelectasis/scar. No pleural effusion. A few pulmonary nodules with the largest being a 0.9 cm subpleural nodule in the lingula, image 150:5. MEDIASTINUM/AXILLAE: Mediastinal and hilar lymphadenopathy with the largest being a right hilar node measuring 1.6 cm in short axis, image 61:4. Coronary artery bypass grafts. CORONARY ARTERY CALCIFICATION: Previous intervention (stents or CABG). HEPATOBILIARY: Normal. PANCREAS: Normal. SPLEEN: Normal. ADRENAL GLANDS: Normal. KIDNEYS/BLADDER: Simple renal cysts, no follow-up required. BOWEL: No obstruction or inflammatory change. Normal appendix. LYMPH NODES: Retroperitoneal and bilateral iliac lymphadenopathy with the largest being a left external iliac node measuring 2.0 cm in short axis, image 251:4. VASCULATURE: No abdominal aortic aneurysm. PELVIC ORGANS: The prostate is mildly enlarged and diffusely heterogeneous. MUSCULOSKELETAL: Spine dictated separately. Median sternotomy. A few small ill-defined sclerotic bone lesions in the pelvis which are most consistent with metastatic disease.     IMPRESSION: 1.  Lymphadenopathy in the chest, abdomen and pelvis which is most likely metastatic. 2.  Sclerotic osseous metastases. While nonspecific, these are frequently seen with prostate cancer. Recommend correlation with PSA. 3.  A few indeterminate pulmonary nodules measuring up to 0.9 cm.     CT Cervical Spine w/o Contrast    Result Date: 1/16/2025  EXAM: CT CERVICAL SPINE W/O CONTRAST, CT LUMBAR SPINE RECONSTRUCTED, CT THORACIC SPINE  RECONSTRUCTED LOCATION: [facility] DATE: 1/16/2025 INDICATION: Back pain neck pain, numbness weakness unintended weight loss. COMPARISON: None. TECHNIQUE: 1) Routine CT Cervical Spine without IV contrast. Multiplanar reformats. Dose reduction techniques were used. 2) Dedicated axial, sagittal, and coronal images of the Thoracic Spine were generated utilizing CT chest source data and are separately reviewed. Dose reduction techniques were used. 3) Dedicated axial, sagittal, and coronal images of the Lumbar Spine were generated utilizing CT abdomen pelvis source data and are separately reviewed. Dose reduction techniques were used. FINDINGS: CERVICAL SPINE CT: VERTEBRA: Nondisplaced lucencies through the lamina and bilateral facets at C7. Heterogeneous sclerotic C7 vertebral body with mild height loss relative to the remainder of the cervical vertebral bodies. Preserved vertebral heights. Degenerative C4-C5 anterolisthesis. Otherwise preserved alignment. No traumatic subluxation. No acute fracture. No prevertebral edema. CANAL: No bony encroachment on the spinal canal. PARASPINAL: No acute extraspinal abnormality THORACIC SPINE CT: VERTEBRA: Subtle sclerotic foci involving the T3, T7 vertebral bodies. Preserved vertebral heights and alignment. No traumatic subluxation. No acute fracture. CANAL: No bony encroachment on the spinal canal. PARASPINAL: No acute extraspinal abnormality. LUMBAR SPINE CT: VERTEBRA: Radiographically intact L4-L5 interbody, pedicle screw and larry instrumented fusion. Sclerotic lesions within the L2, L3 and L4 vertebral bodies, bilateral iliac bones. Preserved vertebral heights and alignment. No traumatic subluxation. No acute fracture. CANAL: No bony encroachment on the spinal canal. PARASPINAL: No acute extraspinal abnormality.     IMPRESSION: CERVICAL SPINE CT: 1.  Bilateral nondisplaced C7 facet and lamina fractures and irregularly sclerotic C7 vertebral body. Finding  could represent a recent pathologic fracture in the setting of sclerotic metastasis. No traumatic subluxation. THORACIC SPINE CT: 1.  Negative for acute fracture or traumatic subluxation. 2.  Subtle sclerotic foci involving multiple vertebrae. Findings could represent sclerotic metastases. LUMBAR SPINE CT: 1.  Negative for acute fracture or traumatic subluxation. 2.  Subtle sclerotic lesions involving multiple vertebral bodies, sacrum and iliac bones. Findings could represent sclerotic metastases. Findings communicated to Dr. Wiggins by me at 1018 hours on 1/16/2025.     CT Thoracic Spine Reconstructed    Result Date: 1/16/2025  EXAM: CT CERVICAL SPINE W/O CONTRAST, CT LUMBAR SPINE RECONSTRUCTED, CT THORACIC SPINE RECONSTRUCTED LOCATION: [facility] DATE: 1/16/2025 INDICATION: Back pain neck pain, numbness weakness unintended weight loss. COMPARISON: None. TECHNIQUE: 1) Routine CT Cervical Spine without IV contrast. Multiplanar reformats. Dose reduction techniques were used. 2) Dedicated axial, sagittal, and coronal images of the Thoracic Spine were generated utilizing CT chest source data and are separately reviewed. Dose reduction techniques were used. 3) Dedicated axial, sagittal, and coronal images of the Lumbar Spine were generated utilizing CT abdomen pelvis source data and are separately reviewed. Dose reduction techniques were used. FINDINGS: CERVICAL SPINE CT: VERTEBRA: Nondisplaced lucencies through the lamina and bilateral facets at C7. Heterogeneous sclerotic C7 vertebral body with mild height loss relative to the remainder of the cervical vertebral bodies. Preserved vertebral heights. Degenerative C4-C5 anterolisthesis. Otherwise preserved alignment. No traumatic subluxation. No acute fracture. No prevertebral edema. CANAL: No bony encroachment on the spinal canal. PARASPINAL: No acute extraspinal abnormality THORACIC SPINE CT: VERTEBRA: Subtle sclerotic foci involving the T3,  T7 vertebral bodies. Preserved vertebral heights and alignment. No traumatic subluxation. No acute fracture. CANAL: No bony encroachment on the spinal canal. PARASPINAL: No acute extraspinal abnormality. LUMBAR SPINE CT: VERTEBRA: Radiographically intact L4-L5 interbody, pedicle screw and larry instrumented fusion. Sclerotic lesions within the L2, L3 and L4 vertebral bodies, bilateral iliac bones. Preserved vertebral heights and alignment. No traumatic subluxation. No acute fracture. CANAL: No bony encroachment on the spinal canal. PARASPINAL: No acute extraspinal abnormality.     IMPRESSION: CERVICAL SPINE CT: 1.  Bilateral nondisplaced C7 facet and lamina fractures and irregularly sclerotic C7 vertebral body. Finding could represent a recent pathologic fracture in the setting of sclerotic metastasis. No traumatic subluxation. THORACIC SPINE CT: 1.  Negative for acute fracture or traumatic subluxation. 2.  Subtle sclerotic foci involving multiple vertebrae. Findings could represent sclerotic metastases. LUMBAR SPINE CT: 1.  Negative for acute fracture or traumatic subluxation. 2.  Subtle sclerotic lesions involving multiple vertebral bodies, sacrum and iliac bones. Findings could represent sclerotic metastases. Findings communicated to Dr. Wiggins by me at 1018 hours on 1/16/2025.     CT Lumbar Spine Reconstructed    Result Date: 1/16/2025  EXAM: CT CERVICAL SPINE W/O CONTRAST, CT LUMBAR SPINE RECONSTRUCTED, CT THORACIC SPINE RECONSTRUCTED LOCATION: [facility] DATE: 1/16/2025 INDICATION: Back pain neck pain, numbness weakness unintended weight loss. COMPARISON: None. TECHNIQUE: 1) Routine CT Cervical Spine without IV contrast. Multiplanar reformats. Dose reduction techniques were used. 2) Dedicated axial, sagittal, and coronal images of the Thoracic Spine were generated utilizing CT chest source data and are separately reviewed. Dose reduction techniques were used. 3) Dedicated axial,  sagittal, and coronal images of the Lumbar Spine were generated utilizing CT abdomen pelvis source data and are separately reviewed. Dose reduction techniques were used. FINDINGS: CERVICAL SPINE CT: VERTEBRA: Nondisplaced lucencies through the lamina and bilateral facets at C7. Heterogeneous sclerotic C7 vertebral body with mild height loss relative to the remainder of the cervical vertebral bodies. Preserved vertebral heights. Degenerative C4-C5 anterolisthesis. Otherwise preserved alignment. No traumatic subluxation. No acute fracture. No prevertebral edema. CANAL: No bony encroachment on the spinal canal. PARASPINAL: No acute extraspinal abnormality THORACIC SPINE CT: VERTEBRA: Subtle sclerotic foci involving the T3, T7 vertebral bodies. Preserved vertebral heights and alignment. No traumatic subluxation. No acute fracture. CANAL: No bony encroachment on the spinal canal. PARASPINAL: No acute extraspinal abnormality. LUMBAR SPINE CT: VERTEBRA: Radiographically intact L4-L5 interbody, pedicle screw and larry instrumented fusion. Sclerotic lesions within the L2, L3 and L4 vertebral bodies, bilateral iliac bones. Preserved vertebral heights and alignment. No traumatic subluxation. No acute fracture. CANAL: No bony encroachment on the spinal canal. PARASPINAL: No acute extraspinal abnormality.     IMPRESSION: CERVICAL SPINE CT: 1.  Bilateral nondisplaced C7 facet and lamina fractures and irregularly sclerotic C7 vertebral body. Finding could represent a recent pathologic fracture in the setting of sclerotic metastasis. No traumatic subluxation. THORACIC SPINE CT: 1.  Negative for acute fracture or traumatic subluxation. 2.  Subtle sclerotic foci involving multiple vertebrae. Findings could represent sclerotic metastases. LUMBAR SPINE CT: 1.  Negative for acute fracture or traumatic subluxation. 2.  Subtle sclerotic lesions involving multiple vertebral bodies, sacrum and iliac bones. Findings could represent sclerotic  metastases. Findings communicated to Dr. Wiggins by me at 1018 hours on 1/16/2025.      Total time spent was over 45 minutes.  This includes chart prep time, review of clinical data including notes from outside physicians, labs, review of medical imaging, discussion about  plan of care, documentation and .    Signed by: Timothy Sterling MD      This note has been dictated using voice recognition software. Any grammatical or context distortions are unintentional and inherent to the software       Again, thank you for allowing me to participate in the care of your patient.        Sincerely,        Timothy Sterling MD    Electronically signed

## 2025-01-30 NOTE — PROGRESS NOTES
"Oncology Rooming Note    January 30, 2025 9:32 AM   Kamille Gleason is a 79 year old male who presents for:    Chief Complaint   Patient presents with    Oncology Clinic Visit     Prostate cancer metastatic to bone (H)     Initial Vitals: BP (!) 146/69   Pulse 85   Temp 98.3  F (36.8  C)   Resp 16   Ht 1.683 m (5' 6.25\")   Wt 74.2 kg (163 lb 8 oz)   SpO2 96%   BMI 26.19 kg/m   Estimated body mass index is 26.19 kg/m  as calculated from the following:    Height as of this encounter: 1.683 m (5' 6.25\").    Weight as of this encounter: 74.2 kg (163 lb 8 oz). Body surface area is 1.86 meters squared.  Moderate Pain (5) Comment: Data Unavailable   No LMP for male patient.  Allergies reviewed: Yes  Medications reviewed: Yes    Medications: MEDICATION REFILLS NEEDED TODAY. Provider was notified.  Pharmacy name entered into [a]list games: [id number] [address] AT [facility]    Frailty Screening:   Is the patient here for a new oncology consult visit in cancer care? 2. No      Clinical concerns: Course of action      Mariela Maurer LPN             "

## 2025-01-30 NOTE — PROGRESS NOTES
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  PSMA PET scan is showing widespread metastatic disease involving multiple bones, pelvic, retroperitoneal and mediastinal lymph nodes.  He just had a biopsy.  Path is pending.  Nondisplaced fracture of the C7 vertebral body lamina and facets.  Diffuse sclerotic lesions in multiple bones.  Iliac lymphadenopathy left more than the right.  Other medical conditions stable.  Having issues with constipation secondary to pain medication.      Plan      Discussion with the patient and his family.  Discussed with them that typically patients are treated with androgen deprivation therapy unless they have a very high risk disease which is a high Cayla score of 9 etc.  He is 79 years old.  I do not think he is going to handle docetaxel very well upfront.  We are going to start with androgen  deprivation therapy consisting of Firmagon with a loading dose and then maintenance dose after that.  Along with that we will start him on abiraterone 1000 mg p.o. daily along with prednisone 5 mg p.o. twice daily.  We are also going to start him on Zometa for his bone health.  Give them detailed information of about the side effects he will experience from this therapy.  Discussed with the patient and his family that if his pain does not get better or actually if it gets worse then we may have to request radiation oncology consultation to radiate those spots.  I am hoping that the Firmagon therapy can quickly reduce the tumor burden and reduce his symptom burden.  Once we have the pathology report we will send it for molecular testing as well.  We can get a baseline PSA and testosterone level today and then we will follow him along with that.  Continue with good diet and exercise.  Advised to use a neck collar all the time.  The longitudinal plan of care for the diagnosis(es)/condition(s) as documented were addressed during this visit. Due to the added complexity in care, I will continue to support JANIS in the subsequent management and with ongoing continuity of care.    Medical decision making      Extremely complex.  Patient presenting with advanced malignancy with significant pain.  Reviewed notes from each unique source.  Reviewed each unique test.    Ordered tests.    Independently interpreted lab tests and radiological exams performed by other physicians.  Personally reviewed the images of the PSMA PET scan.  Independent historian account obtained from his wife  and daughters.      Risk      Risk of morbidity mortality.  Significant risk of side effects from intervention.       Cancer Staging   No matching staging information was found for the patient.          History of present illness        Mr. Kamille Gleason is a very pleasant 79 year old gentleman who has been diagnosed with metastatic prostate cancer.  " He presented on 16 January 2025 to the emergency room with some acute on chronic back pain.  X-ray showed some sclerotic lesion which led to a CT scan of the neck chest abdomen and pelvis.  CT scan showed bilateral nondisplaced C7 facet and lamina fractures and irregularly sclerotic C7 vertebral body.  Thoracic spine showed subtle sclerotic foci in multiple vertebral bodies as did the lumbar spine.  The abdominal CT also showed bilateral iliac left more than the right lymphadenopathy.  He was admitted in the hospital.  He had a PSA done which was over 500.    The patient was admitted was seen by neurosurgery and other consultants.  Discharged on some pain medication.    Comes in today to discuss his next course of action.  He had an order for a CT-guided biopsy of the iliac lymph node but that has not been done yet.      Review of system      Details noted in the history of present illness.  A detailed review of systems is otherwise negative.      Physical exam        BP (!) 146/69   Pulse 85   Temp 98.3  F (36.8  C)   Resp 16   Ht 1.683 m (5' 6.25\")   Wt 74.2 kg (163 lb 8 oz)   SpO2 96%   BMI 26.19 kg/m      GENERAL: No acute distress. Cooperative in conversation.   HEENT:  Pupils are equal, round and reactive. Oral mucosa is clean and intact. No ulcerations or mucositis noted. No bleeding noted.  RESP:Chest symmetric lungs are clear bilaterally per auscultation. Regular respiratory rate. No wheezes or rhonchi.  CV: Normal S1 S2 Regular, rate and rhythm.     ABD: Nondistended, soft, nontender. Positive bowel sounds. No organomegaly.   EXTREMITIES: No lower extremity edema.   NEURO: Non- focal. Alert and oriented x3.  Cranial nerves appear intact.  PSYCH: Within normal limits. No depression or anxiety.  SKIN: Warm dry intact.      Lab results Reviewed      Recent Results (from the past week)   Glucose by meter   Result Value Ref Range    GLUCOSE BY METER POCT 172 (H) 70 - 99 mg/dL   Comprehensive metabolic " panel   Result Value Ref Range    Sodium 136 135 - 145 mmol/L    Potassium 5.2 3.4 - 5.3 mmol/L    Carbon Dioxide (CO2) 29 22 - 29 mmol/L    Anion Gap 9 7 - 15 mmol/L    Urea Nitrogen 20.3 8.0 - 23.0 mg/dL    Creatinine 0.86 0.67 - 1.17 mg/dL    GFR Estimate 89 >60 mL/min/1.73m2    Calcium 9.8 8.8 - 10.4 mg/dL    Chloride 98 98 - 107 mmol/L    Glucose 152 (H) 70 - 99 mg/dL    Alkaline Phosphatase 1,540 (H) 40 - 150 U/L    AST 47 (H) 0 - 45 U/L    ALT 30 0 - 70 U/L    Protein Total 7.4 6.4 - 8.3 g/dL    Albumin 4.0 3.5 - 5.2 g/dL    Bilirubin Total 0.3 <=1.2 mg/dL       Imaging results Reviewed        CT Lymph Node Biopsy    Result Date: 1/29/2025  EXAM: 1. PERCUTANEOUS BIOPSY LEFT PELVIC NODE 2. CT GUIDANCE 3. CONSCIOUS SEDATION LOCATION: [facility] DATE: 1/29/2025 INDICATION: pelvic lymphadenopathy TECHNIQUE: Dose reduction techniques were used. PROCEDURE: Informed consent obtained. Site marked. Prior images reviewed. Required items made available. Patient identity confirmed verbally and with arm band. Patient reevaluated immediately before administering sedation. Universal protocol was followed. Time out performed. The site was prepped and draped in sterile fashion. 10 mL of 1% lidocaine was infused into the local soft tissues. Using standard technique and under direct CT guidance, a 18-gauge biopsy device was used to obtain three core  biopsies. Tissue was submitted to Pathology and was adequate by preliminary review by a pathologist. The patient tolerated the procedure well. No immediate complications. SEDATION: Versed 2 mg. Fentanyl 100 mcg. The procedure was performed with administration intravenous conscious sedation with appropriate preoperative, intraoperative, and postoperative evaluation. 20 minutes of supervised face to face conscious sedation time was provided by a radiology nurse under my direct supervision.     IMPRESSION: 1.  Successful CT-guided biopsy left pelvic node. Reference  CPT Codes: 70403, 77127, 85865    PET PSMA Eyes to Thighs    Result Date: 1/28/2025  EXAM: PET PSMA EYES TO THIGHS, Ga-68 PSMA LOCATION: [facility] DATE: 1/28/2025 INDICATION: Prostate cancer, staging. Malignant neoplasm of the prostate. PSMA PET/CT scan performed as per NCCN guidelines in the setting of high risk prostate cancer. Initial treatment strategy. COMPARISON: CT abdomen pelvis 1/20/2025, MRI spine 1/16/2025 reviewed. TECHNIQUE: 60 minutes post intravenous administration of 5.95mCi Ga68 Illuccix, PET imaging was performed from the skull vertex to mid thigh utilizing attenuation correction with concurrent axial CT and PET/CT image fusion. Dose reduction techniques were  used. FINDINGS: Radiotracer avid mass centered in the right posterior aspect of the prostate measures approximately 3.9 x 4.5 x 3.9 cm, consistent with primary prostate malignancy. Numerous enlarged radiotracer avid lymph nodes involve several sites above and below the diaphragm, including left pelvic sidewall, left external iliac, bilateral common iliac, left para-aortic, aortocaval, scattered mediastinal and bilateral hilar sites. Additionally, innumerable sclerotic predominant osseous metastases are present involving scattered skeletal sites, including numerous sites in the spine, both scapulae, several ribs, numerous sites in the bony pelvis and proximal right femur. Some of the spinal metastases demonstrate some surrounding extraosseous extension.  Some misregistration is present between PET and CT images in some areas. A couple of subcentimeter pulmonary nodules in the lingula and posterolateral right upper lobe demonstrate mild radiotracer activity, suspicious for pulmonary metastases. No suspicious focal uptake in the liver. Mild senescent intracranial changes. Sternotomy with CABG. Moderate atherosclerotic calcifications. Mild linear scarring or atelectasis in the lower lungs. Few small benign right renal  cysts, no follow-up needed. Few colonic diverticula. Mild thoracolumbar curve. Mild scattered hypertrophic degenerative changes in the spine. Postoperative changes lower lumbar spine with hardware.     IMPRESSION: Findings suspicious for primary prostate malignancy with metastases involving several lymph node sites above and below the diaphragm, both lungs and numerous scattered sites in the skeleton.    Abd/pelvis CT no contrast - Stone Protocol    Result Date: 1/21/2025  EXAM: CT ABDOMEN PELVIS W/O CONTRAST LOCATION: [facility] DATE: 1/21/2025 INDICATION: dilated loops on XR today vs CT yesterday COMPARISON: CT 1/19/2025 TECHNIQUE: CT scan of the abdomen and pelvis was performed without IV contrast. Multiplanar reformats were obtained. Dose reduction techniques were used. CONTRAST: None. FINDINGS: LOWER CHEST: Stable pulmonary nodules. HEPATOBILIARY: Normal. PANCREAS: Normal. SPLEEN: Normal. ADRENAL GLANDS: Normal. KIDNEYS/BLADDER: Redemonstrated renal cysts. BOWEL: No obstruction or inflammatory change. LYMPH NODES: Unchanged retroperitoneal and pelvic lymphadenopathy. VASCULATURE: No abdominal aortic aneurysm. PELVIC ORGANS: Prostatomegaly MUSCULOSKELETAL: Redemonstrated scattered sclerotic osseous lesions. Lower lumbar spinal fusion hardware.     IMPRESSION: 1.  No bowel obstruction. 2.  Stable lymphadenopathy and sclerotic osseous metastases.     XR Abdomen 2 Views    Result Date: 1/20/2025  EXAM: XR ABDOMEN 2 VIEWS LOCATION: [facility] DATE: 1/20/2025 INDICATION: Abdominal pain. COMPARISON: None available.     IMPRESSION: No intraperitoneal free air. There are several mildly dilated, gas-filled loops of small bowel in the central abdomen. Findings could be attributable to an underlying ileus or small bowel obstruction. Correlation with CT imaging is recommended. Median sternotomy and coronary artery bypass grafting. Postsurgical changes of the lower lumbar  spine.    CT Abdomen Pelvis w Contrast    Result Date: 1/19/2025  EXAM: CT ABDOMEN PELVIS W CONTRAST LOCATION: [facility] DATE: 1/19/2025 INDICATION: diffuse abdominal pain, no BM x 2 days COMPARISON: 01/16/2025 TECHNIQUE: CT scan of the abdomen and pelvis was performed following injection of IV contrast. Multiplanar reformats were obtained. Dose reduction techniques were used. CONTRAST: ISOVUE 370 70ML FINDINGS: LOWER CHEST: 9 mm subpleural nodule lingula as 2, 2 and 5 mm subpleural right middle lobe nodule image 2 unchanged. Mild basilar atelectasis. HEPATOBILIARY: Slight gallbladder distention. PANCREAS: Normal. SPLEEN: Normal. ADRENAL GLANDS: Normal. KIDNEYS/BLADDER: Right renal cyst. Bilateral subcentimeter renal hypodensities small for characterization. BOWEL: Normal caliber. Normal appendix. LYMPH NODES: Retroperitoneal and pelvic large or nodes is a left external iliac node 2.3 cm S3, 176. Adenopathy. VASCULATURE: Atherosclerotic vascular calcification. PELVIC ORGANS: Prostate is enlarged and heterogeneous. MUSCULOSKELETAL: Postsurgical change lumbar spine. Degenerative change osseous structures. Scattered osseous sclerotic lesions in the pelvis are again seen.     IMPRESSION: 1.  Retroperitoneal and pelvic adenopathy again seen. 2.  Sclerotic lesions within the pelvis presumably metastatic. 3.  Prominent, heterogeneous prostate again seen. PSA correlation recommended. 4.  Basilar pulmonary nodules unchanged.    XR Cervical Spine 2/3 Views    Result Date: 1/17/2025  EXAM: XR CERVICAL SPINE 2/3 VIEWS LOCATION: [facility] DATE: 1/17/2025 INDICATION: C7 pathologic compression fracture in collar, note alignment to compare to outpatient xrays COMPARISON: 1/16/2025 CT/MRI scan.     IMPRESSION: The marrow invasive process at the C7 vertebrae is not visualized on plain film x-ray. Shoulder artifact obscures the C7-T1 level on the lateral view. The remaining levels  otherwise demonstrate DDD change including loss of disc height at C3-4, C4-5, C5-C6 and C6-C7. No prevertebral soft tissue swelling.     MR Cervical Spine w/o & w Contrast    Result Date: 1/16/2025  EXAM: MR CERVICAL SPINE W/O and W CONTRAST, MR LUMBAR SPINE W/O and W CONTRAST, MR THORACIC SPINE W/O and W CONTRAST LOCATION: [facility] DATE: 1/16/2025 INDICATION: pathologic fracture C7, further characterization COMPARISON: None. CONTRAST: 8 mL Gadavist TECHNIQUE: 1) MRI Cervical Spine without and with IV contrast. 2) MRI Thoracic Spine without and with IV contrast. 3) MRI Lumbar Spine without and with IV contrast. FINDINGS: CERVICAL SPINE: Suboptimal study due to patient motion artifact. T1 hypointense, T2 and STIR hyperintense signal changes with associated contrast enhancement in multiple cervical vertebral bodies and posterior elements. Mild height loss at C7, can represent pathologic fracture. There is retropulsion of the C7 vertebral body with questionable extraosseous extension. There is evidence of extraosseous extension in C7 spinous process. Prominent dorsal epidural T2 hyperintense signal and enhancement extending from it C6 down to T2. No abnormal spinal cord signal. Multilevel disc height loss, osteophyte formation and facet arthropathy. Craniovertebral junction and C1-C2: Normal. C2-C3: Normal disc height. No herniation. Bilateral facet arthropathy. No spinal canal or neural foraminal stenosis. C3-C4: Mild disc height loss and disc degeneration. Right eccentric disc osteophyte complex and bilateral facet arthropathy moderate to severe right and moderate left neural foraminal stenosis. Mild to moderate spinal canal stenosis. C4-C5: Mild to moderate disc height loss. Disc osteophyte complex and left greater than right facet arthropathy. Mild to moderate bilateral neural foraminal stenosis. No spinal canal stenosis. C5-C6: Mild disc height loss and disc degeneration. Disc osteophyte  complex and superimposed central protrusion. Bilateral facet arthropathy. Severe left and moderate to severe right neural foraminal stenosis. Mild spinal canal stenosis C6-C7: Mild disc height loss and disc degeneration. Disc osteophyte complex and bilateral uncinate spurring. Bilateral facet arthropathy severe bilateral neural foraminal stenosis. Moderate spinal canal stenosis. C7-T1: Mild disc height loss and disc degeneration. Bilateral facet arthropathy. Extraosseous extension into bilateral neural foramina results in severe bilateral neural foraminal stenosis. Mild spinal canal stenosis. THORACIC SPINE: Suboptimal study due to patient motion artifact. T1 hypointense, T2 and STIR hyperintense signal changes with associated contrast enhancement in multiple vertebral bodies and posterior elements, most prominent in T1-T3 vertebral bodies and posterior elements. No pathologic compression fracture. No evidence of extraosseous extension. Multilevel disc height loss, disc degeneration, osteophyte formation and facet arthropathy. Moderate spinal canal stenosis at T10-11, mild to moderate spinal canal stenosis at T9-10 and mild spinal canal stenosis at T8-9. Severe bilateral neural foraminal stenosis at T1-T2 and T10-11. Multilevel mixed Modic type I and type II endplate changes. No abnormal spinal cord signal. No extraspinal abnormality. LUMBAR SPINE: Suboptimal study due to patient motion artifact and susceptibility artifact from fusion hardware. Nomenclature is based on 5 lumbar type vertebral bodies. Postsurgical changes of instrumented anterior and posterior spinal fusion at L4-5. T1 hypointense, T2 and STIR hyperintense signal changes with associated involvement in the lumbosacral vertebral bodies and posterior elements. No significant height loss to suggest pathology compression fracture. Question ventral epidural enhancing soft  tissue at L4 and L5 levels, may represent extraosseous extension. Although difficult  to evaluate, there appears to be severe spinal canal stenosis at L4 and L5 levels. Multilevel disc height loss, osteophyte formation and facet arthropathy. Mild spinal canal stenosis at L1-2 and mild to moderate spinal canal stenosis at L2-3. Prominent epidural fat contributes to severe spinal canal stenosis at L5-S1. Bilateral renal cysts. The tip of the conus medullaris likely at L2 level. Cauda equina nerve roots cannot be well evaluated.     IMPRESSION: CERVICAL SPINE MRI: 1.  Suboptimal study due to patient motion artifact. 2.  T1 hypointense, T2 and STIR hyperintense signal changes with associated contrast enhancement in cervical vertebral bodies and posterior elements, most pronounced between C4 and C7 levels. 3.  Mild vertebral body height loss at C7, may represent pathology compression fracture. Evidence of extraosseous extension at C7, in particular in the spinous process and inferior vertebral body. Resultant severe bilateral neural foraminal stenosis at C7-T1. Posterior epidural T2 hyperintense signal and enhancement extending from C6 down to T2 may represent external stenosis tumoral extension into epidural space. 4.  No definite abnormal spinal cord signal. 5.  Multilevel cervical spondylosis as detailed above. THORACIC SPINE MRI: 1.  Suboptimal study due to patient motion artifact. 2.  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 No significant vertebral body height loss to suggest pathologic compression fracture. No evidence of extraosseous extension. 3.  Multilevel thoracic spondylosis as detailed above in the body of the report. LUMBAR SPINE MRI: 1.  Suboptimal study due to patient motion artifact and susceptibility artifact from fusion hardware. 2.  Diffuse osseous metastatic disease involving the lumbosacral vertebral bodies and posterior elements, most pronounced in L3-L5 levels. No significant vertebral body height loss to suggest pathologic  compression fracture. Question retropulsion of L4 and L5 vertebral bodies with possible enhancing soft tissue in the anterior epidural space, suggestive of extraosseous extension. Resultant right to severe spinal canal stenosis at these levels. 3.  No evidence of extraosseous extension elsewhere in the lumbosacral spine. 4.  Multilevel lumbar spondylosis in the remaining lumbar spine as detailed in the body of the report.    MR Thoracic Spine w/o & w Contrast    Result Date: 1/16/2025  EXAM: MR CERVICAL SPINE W/O and W CONTRAST, MR LUMBAR SPINE W/O and W CONTRAST, MR THORACIC SPINE W/O and W CONTRAST LOCATION: [facility] DATE: 1/16/2025 INDICATION: pathologic fracture C7, further characterization COMPARISON: None. CONTRAST: 8 mL Gadavist TECHNIQUE: 1) MRI Cervical Spine without and with IV contrast. 2) MRI Thoracic Spine without and with IV contrast. 3) MRI Lumbar Spine without and with IV contrast. FINDINGS: CERVICAL SPINE: Suboptimal study due to patient motion artifact. T1 hypointense, T2 and STIR hyperintense signal changes with associated contrast enhancement in multiple cervical vertebral bodies and posterior elements. Mild height loss at C7, can represent pathologic fracture. There is retropulsion of the C7 vertebral body with questionable extraosseous extension. There is evidence of extraosseous extension in C7 spinous process. Prominent dorsal epidural T2 hyperintense signal and enhancement extending from it C6 down to T2. No abnormal spinal cord signal. Multilevel disc height loss, osteophyte formation and facet arthropathy. Craniovertebral junction and C1-C2: Normal. C2-C3: Normal disc height. No herniation. Bilateral facet arthropathy. No spinal canal or neural foraminal stenosis. C3-C4: Mild disc height loss and disc degeneration. Right eccentric disc osteophyte complex and bilateral facet arthropathy moderate to severe right and moderate left neural foraminal stenosis. Mild to  moderate spinal canal stenosis. C4-C5: Mild to moderate disc height loss. Disc osteophyte complex and left greater than right facet arthropathy. Mild to moderate bilateral neural foraminal stenosis. No spinal canal stenosis. C5-C6: Mild disc height loss and disc degeneration. Disc osteophyte complex and superimposed central protrusion. Bilateral facet arthropathy. Severe left and moderate to severe right neural foraminal stenosis. Mild spinal canal stenosis C6-C7: Mild disc height loss and disc degeneration. Disc osteophyte complex and bilateral uncinate spurring. Bilateral facet arthropathy severe bilateral neural foraminal stenosis. Moderate spinal canal stenosis. C7-T1: Mild disc height loss and disc degeneration. Bilateral facet arthropathy. Extraosseous extension into bilateral neural foramina results in severe bilateral neural foraminal stenosis. Mild spinal canal stenosis. THORACIC SPINE: Suboptimal study due to patient motion artifact. T1 hypointense, T2 and STIR hyperintense signal changes with associated contrast enhancement in multiple vertebral bodies and posterior elements, most prominent in T1-T3 vertebral bodies and posterior elements. No pathologic compression fracture. No evidence of extraosseous extension. Multilevel disc height loss, disc degeneration, osteophyte formation and facet arthropathy. Moderate spinal canal stenosis at T10-11, mild to moderate spinal canal stenosis at T9-10 and mild spinal canal stenosis at T8-9. Severe bilateral neural foraminal stenosis at T1-T2 and T10-11. Multilevel mixed Modic type I and type II endplate changes. No abnormal spinal cord signal. No extraspinal abnormality. LUMBAR SPINE: Suboptimal study due to patient motion artifact and susceptibility artifact from fusion hardware. Nomenclature is based on 5 lumbar type vertebral bodies. Postsurgical changes of instrumented anterior and posterior spinal fusion at L4-5. T1 hypointense, T2 and STIR hyperintense signal  changes with associated involvement in the lumbosacral vertebral bodies and posterior elements. No significant height loss to suggest pathology compression fracture. Question ventral epidural enhancing soft  tissue at L4 and L5 levels, may represent extraosseous extension. Although difficult to evaluate, there appears to be severe spinal canal stenosis at L4 and L5 levels. Multilevel disc height loss, osteophyte formation and facet arthropathy. Mild spinal canal stenosis at L1-2 and mild to moderate spinal canal stenosis at L2-3. Prominent epidural fat contributes to severe spinal canal stenosis at L5-S1. Bilateral renal cysts. The tip of the conus medullaris likely at L2 level. Cauda equina nerve roots cannot be well evaluated.     IMPRESSION: CERVICAL SPINE MRI: 1.  Suboptimal study due to patient motion artifact. 2.  T1 hypointense, T2 and STIR hyperintense signal changes with associated contrast enhancement in cervical vertebral bodies and posterior elements, most pronounced between C4 and C7 levels. 3.  Mild vertebral body height loss at C7, may represent pathology compression fracture. Evidence of extraosseous extension at C7, in particular in the spinous process and inferior vertebral body. Resultant severe bilateral neural foraminal stenosis at C7-T1. Posterior epidural T2 hyperintense signal and enhancement extending from C6 down to T2 may represent external stenosis tumoral extension into epidural space. 4.  No definite abnormal spinal cord signal. 5.  Multilevel cervical spondylosis as detailed above. THORACIC SPINE MRI: 1.  Suboptimal study due to patient motion artifact. 2.  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 No significant vertebral body height loss to suggest pathologic compression fracture. No evidence of extraosseous extension. 3.  Multilevel thoracic spondylosis as detailed above in the body of the report. LUMBAR SPINE MRI: 1.  Suboptimal  study due to patient motion artifact and susceptibility artifact from fusion hardware. 2.  Diffuse osseous metastatic disease involving the lumbosacral vertebral bodies and posterior elements, most pronounced in L3-L5 levels. No significant vertebral body height loss to suggest pathologic compression fracture. Question retropulsion of L4 and L5 vertebral bodies with possible enhancing soft tissue in the anterior epidural space, suggestive of extraosseous extension. Resultant right to severe spinal canal stenosis at these levels. 3.  No evidence of extraosseous extension elsewhere in the lumbosacral spine. 4.  Multilevel lumbar spondylosis in the remaining lumbar spine as detailed in the body of the report.    MR Lumbar Spine w/o & w Contrast    Result Date: 1/16/2025  EXAM: MR CERVICAL SPINE W/O and W CONTRAST, MR LUMBAR SPINE W/O and W CONTRAST, MR THORACIC SPINE W/O and W CONTRAST LOCATION: [facility] DATE: 1/16/2025 INDICATION: pathologic fracture C7, further characterization COMPARISON: None. CONTRAST: 8 mL Gadavist TECHNIQUE: 1) MRI Cervical Spine without and with IV contrast. 2) MRI Thoracic Spine without and with IV contrast. 3) MRI Lumbar Spine without and with IV contrast. FINDINGS: CERVICAL SPINE: Suboptimal study due to patient motion artifact. T1 hypointense, T2 and STIR hyperintense signal changes with associated contrast enhancement in multiple cervical vertebral bodies and posterior elements. Mild height loss at C7, can represent pathologic fracture. There is retropulsion of the C7 vertebral body with questionable extraosseous extension. There is evidence of extraosseous extension in C7 spinous process. Prominent dorsal epidural T2 hyperintense signal and enhancement extending from it C6 down to T2. No abnormal spinal cord signal. Multilevel disc height loss, osteophyte formation and facet arthropathy. Craniovertebral junction and C1-C2: Normal. C2-C3: Normal disc height. No  herniation. Bilateral facet arthropathy. No spinal canal or neural foraminal stenosis. C3-C4: Mild disc height loss and disc degeneration. Right eccentric disc osteophyte complex and bilateral facet arthropathy moderate to severe right and moderate left neural foraminal stenosis. Mild to moderate spinal canal stenosis. C4-C5: Mild to moderate disc height loss. Disc osteophyte complex and left greater than right facet arthropathy. Mild to moderate bilateral neural foraminal stenosis. No spinal canal stenosis. C5-C6: Mild disc height loss and disc degeneration. Disc osteophyte complex and superimposed central protrusion. Bilateral facet arthropathy. Severe left and moderate to severe right neural foraminal stenosis. Mild spinal canal stenosis C6-C7: Mild disc height loss and disc degeneration. Disc osteophyte complex and bilateral uncinate spurring. Bilateral facet arthropathy severe bilateral neural foraminal stenosis. Moderate spinal canal stenosis. C7-T1: Mild disc height loss and disc degeneration. Bilateral facet arthropathy. Extraosseous extension into bilateral neural foramina results in severe bilateral neural foraminal stenosis. Mild spinal canal stenosis. THORACIC SPINE: Suboptimal study due to patient motion artifact. T1 hypointense, T2 and STIR hyperintense signal changes with associated contrast enhancement in multiple vertebral bodies and posterior elements, most prominent in T1-T3 vertebral bodies and posterior elements. No pathologic compression fracture. No evidence of extraosseous extension. Multilevel disc height loss, disc degeneration, osteophyte formation and facet arthropathy. Moderate spinal canal stenosis at T10-11, mild to moderate spinal canal stenosis at T9-10 and mild spinal canal stenosis at T8-9. Severe bilateral neural foraminal stenosis at T1-T2 and T10-11. Multilevel mixed Modic type I and type II endplate changes. No abnormal spinal cord signal. No extraspinal abnormality. LUMBAR  SPINE: Suboptimal study due to patient motion artifact and susceptibility artifact from fusion hardware. Nomenclature is based on 5 lumbar type vertebral bodies. Postsurgical changes of instrumented anterior and posterior spinal fusion at L4-5. T1 hypointense, T2 and STIR hyperintense signal changes with associated involvement in the lumbosacral vertebral bodies and posterior elements. No significant height loss to suggest pathology compression fracture. Question ventral epidural enhancing soft  tissue at L4 and L5 levels, may represent extraosseous extension. Although difficult to evaluate, there appears to be severe spinal canal stenosis at L4 and L5 levels. Multilevel disc height loss, osteophyte formation and facet arthropathy. Mild spinal canal stenosis at L1-2 and mild to moderate spinal canal stenosis at L2-3. Prominent epidural fat contributes to severe spinal canal stenosis at L5-S1. Bilateral renal cysts. The tip of the conus medullaris likely at L2 level. Cauda equina nerve roots cannot be well evaluated.     IMPRESSION: CERVICAL SPINE MRI: 1.  Suboptimal study due to patient motion artifact. 2.  T1 hypointense, T2 and STIR hyperintense signal changes with associated contrast enhancement in cervical vertebral bodies and posterior elements, most pronounced between C4 and C7 levels. 3.  Mild vertebral body height loss at C7, may represent pathology compression fracture. Evidence of extraosseous extension at C7, in particular in the spinous process and inferior vertebral body. Resultant severe bilateral neural foraminal stenosis at C7-T1. Posterior epidural T2 hyperintense signal and enhancement extending from C6 down to T2 may represent external stenosis tumoral extension into epidural space. 4.  No definite abnormal spinal cord signal. 5.  Multilevel cervical spondylosis as detailed above. THORACIC SPINE MRI: 1.  Suboptimal study due to patient motion artifact. 2.  Diffuse osseous metastatic disease in the  thoracic vertebral bodies and posterior elements. No significant vertebral body height loss to suggest pathologic compression fracture. No evidence of extraosseous extension. 3.  Multilevel thoracic spondylosis as detailed above in the body of the report. LUMBAR SPINE MRI: 1.  Suboptimal study due to patient motion artifact and susceptibility artifact from fusion hardware. 2.  Diffuse osseous metastatic disease involving the lumbosacral vertebral bodies and posterior elements, most pronounced in L3-L5 levels. No significant vertebral body height loss to suggest pathologic compression fracture. Question retropulsion of L4 and L5 vertebral bodies with possible enhancing soft tissue in the anterior epidural space, suggestive of extraosseous extension. Resultant right to severe spinal canal stenosis at these levels. 3.  No evidence of extraosseous extension elsewhere in the lumbosacral spine. 4.  Multilevel lumbar spondylosis in the remaining lumbar spine as detailed in the body of the report.    MR Brain w/o & w Contrast    Result Date: 1/16/2025  EXAM: MR BRAIN W/O and W CONTRAST LOCATION: [facility] DATE: 1/16/2025 INDICATION: eval for malignancy COMPARISON: Brain MRI 4/9/2020. CONTRAST: 8 mL Gadavist TECHNIQUE: Routine multiplanar multisequence head MRI without and with intravenous contrast. FINDINGS: INTRACRANIAL CONTENTS: No acute or subacute infarct. No mass, acute hemorrhage, or extra-axial fluid collections. Scattered nonspecific T2/FLAIR hyperintensities within the cerebral white matter most consistent with mild chronic microvascular ischemic change. Mild to moderate generalized cerebral volume loss. Normal ventricles and sulci. Normal position of the cerebellar tonsils. No pathologic contrast enhancement. SELLA: No abnormality accounting for technique. OSSEOUS STRUCTURES/SOFT TISSUES: Normal marrow signal. The major intracranial vascular flow voids are maintained. ORBITS: Prior  bilateral cataract surgery. Visualized portions of the orbits are otherwise unremarkable. SINUSES/MASTOIDS: No paranasal sinus mucosal disease. No middle ear or mastoid effusion.     IMPRESSION: 1.  No acute intracranial process. 2.  No abnormal intracranial enhancement to suggest intracranial malignancy. 3.  Generalized brain atrophy and presumed microvascular ischemic changes as detailed above.     CT Chest/Abdomen/Pelvis w Contrast    Result Date: 1/16/2025  EXAM: CT CHEST/ABDOMEN/PELVIS W CONTRAST LOCATION: [facility] DATE: 1/16/2025 INDICATION: Unintended weight loss over last multiple months generalized fatigue weakness and back pain malignancy eval COMPARISON: None. TECHNIQUE: CT scan of the chest, abdomen, and pelvis was performed following injection of IV contrast. A delayed phase of the abdomen was performed. Multiplanar reformats were obtained. Dose reduction techniques were used. CONTRAST: 83ml isovue 370 FINDINGS: LUNGS AND PLEURA: Mild bibasilar atelectasis/scar. No pleural effusion. A few pulmonary nodules with the largest being a 0.9 cm subpleural nodule in the lingula, image 150:5. MEDIASTINUM/AXILLAE: Mediastinal and hilar lymphadenopathy with the largest being a right hilar node measuring 1.6 cm in short axis, image 61:4. Coronary artery bypass grafts. CORONARY ARTERY CALCIFICATION: Previous intervention (stents or CABG). HEPATOBILIARY: Normal. PANCREAS: Normal. SPLEEN: Normal. ADRENAL GLANDS: Normal. KIDNEYS/BLADDER: Simple renal cysts, no follow-up required. BOWEL: No obstruction or inflammatory change. Normal appendix. LYMPH NODES: Retroperitoneal and bilateral iliac lymphadenopathy with the largest being a left external iliac node measuring 2.0 cm in short axis, image 251:4. VASCULATURE: No abdominal aortic aneurysm. PELVIC ORGANS: The prostate is mildly enlarged and diffusely heterogeneous. MUSCULOSKELETAL: Spine dictated separately. Median sternotomy. A few small  ill-defined sclerotic bone lesions in the pelvis which are most consistent with metastatic disease.     IMPRESSION: 1.  Lymphadenopathy in the chest, abdomen and pelvis which is most likely metastatic. 2.  Sclerotic osseous metastases. While nonspecific, these are frequently seen with prostate cancer. Recommend correlation with PSA. 3.  A few indeterminate pulmonary nodules measuring up to 0.9 cm.     CT Cervical Spine w/o Contrast    Result Date: 1/16/2025  EXAM: CT CERVICAL SPINE W/O CONTRAST, CT LUMBAR SPINE RECONSTRUCTED, CT THORACIC SPINE RECONSTRUCTED LOCATION: [facility] DATE: 1/16/2025 INDICATION: Back pain neck pain, numbness weakness unintended weight loss. COMPARISON: None. TECHNIQUE: 1) Routine CT Cervical Spine without IV contrast. Multiplanar reformats. Dose reduction techniques were used. 2) Dedicated axial, sagittal, and coronal images of the Thoracic Spine were generated utilizing CT chest source data and are separately reviewed. Dose reduction techniques were used. 3) Dedicated axial, sagittal, and coronal images of the Lumbar Spine were generated utilizing CT abdomen pelvis source data and are separately reviewed. Dose reduction techniques were used. FINDINGS: CERVICAL SPINE CT: VERTEBRA: Nondisplaced lucencies through the lamina and bilateral facets at C7. Heterogeneous sclerotic C7 vertebral body with mild height loss relative to the remainder of the cervical vertebral bodies. Preserved vertebral heights. Degenerative C4-C5 anterolisthesis. Otherwise preserved alignment. No traumatic subluxation. No acute fracture. No prevertebral edema. CANAL: No bony encroachment on the spinal canal. PARASPINAL: No acute extraspinal abnormality THORACIC SPINE CT: VERTEBRA: Subtle sclerotic foci involving the T3, T7 vertebral bodies. Preserved vertebral heights and alignment. No traumatic subluxation. No acute fracture. CANAL: No bony encroachment on the spinal canal. PARASPINAL: No  acute extraspinal abnormality. LUMBAR SPINE CT: VERTEBRA: Radiographically intact L4-L5 interbody, pedicle screw and larry instrumented fusion. Sclerotic lesions within the L2, L3 and L4 vertebral bodies, bilateral iliac bones. Preserved vertebral heights and alignment. No traumatic subluxation. No acute fracture. CANAL: No bony encroachment on the spinal canal. PARASPINAL: No acute extraspinal abnormality.     IMPRESSION: CERVICAL SPINE CT: 1.  Bilateral nondisplaced C7 facet and lamina fractures and irregularly sclerotic C7 vertebral body. Finding could represent a recent pathologic fracture in the setting of sclerotic metastasis. No traumatic subluxation. THORACIC SPINE CT: 1.  Negative for acute fracture or traumatic subluxation. 2.  Subtle sclerotic foci involving multiple vertebrae. Findings could represent sclerotic metastases. LUMBAR SPINE CT: 1.  Negative for acute fracture or traumatic subluxation. 2.  Subtle sclerotic lesions involving multiple vertebral bodies, sacrum and iliac bones. Findings could represent sclerotic metastases. Findings communicated to Dr. Wiggins by me at 1018 hours on 1/16/2025.     CT Thoracic Spine Reconstructed    Result Date: 1/16/2025  EXAM: CT CERVICAL SPINE W/O CONTRAST, CT LUMBAR SPINE RECONSTRUCTED, CT THORACIC SPINE RECONSTRUCTED LOCATION: [facility] DATE: 1/16/2025 INDICATION: Back pain neck pain, numbness weakness unintended weight loss. COMPARISON: None. TECHNIQUE: 1) Routine CT Cervical Spine without IV contrast. Multiplanar reformats. Dose reduction techniques were used. 2) Dedicated axial, sagittal, and coronal images of the Thoracic Spine were generated utilizing CT chest source data and are separately reviewed. Dose reduction techniques were used. 3) Dedicated axial, sagittal, and coronal images of the Lumbar Spine were generated utilizing CT abdomen pelvis source data and are separately reviewed. Dose reduction techniques were used.  FINDINGS: CERVICAL SPINE CT: VERTEBRA: Nondisplaced lucencies through the lamina and bilateral facets at C7. Heterogeneous sclerotic C7 vertebral body with mild height loss relative to the remainder of the cervical vertebral bodies. Preserved vertebral heights. Degenerative C4-C5 anterolisthesis. Otherwise preserved alignment. No traumatic subluxation. No acute fracture. No prevertebral edema. CANAL: No bony encroachment on the spinal canal. PARASPINAL: No acute extraspinal abnormality THORACIC SPINE CT: VERTEBRA: Subtle sclerotic foci involving the T3, T7 vertebral bodies. Preserved vertebral heights and alignment. No traumatic subluxation. No acute fracture. CANAL: No bony encroachment on the spinal canal. PARASPINAL: No acute extraspinal abnormality. LUMBAR SPINE CT: VERTEBRA: Radiographically intact L4-L5 interbody, pedicle screw and larry instrumented fusion. Sclerotic lesions within the L2, L3 and L4 vertebral bodies, bilateral iliac bones. Preserved vertebral heights and alignment. No traumatic subluxation. No acute fracture. CANAL: No bony encroachment on the spinal canal. PARASPINAL: No acute extraspinal abnormality.     IMPRESSION: CERVICAL SPINE CT: 1.  Bilateral nondisplaced C7 facet and lamina fractures and irregularly sclerotic C7 vertebral body. Finding could represent a recent pathologic fracture in the setting of sclerotic metastasis. No traumatic subluxation. THORACIC SPINE CT: 1.  Negative for acute fracture or traumatic subluxation. 2.  Subtle sclerotic foci involving multiple vertebrae. Findings could represent sclerotic metastases. LUMBAR SPINE CT: 1.  Negative for acute fracture or traumatic subluxation. 2.  Subtle sclerotic lesions involving multiple vertebral bodies, sacrum and iliac bones. Findings could represent sclerotic metastases. Findings communicated to Dr. Wiggins by me at 1018 hours on 1/16/2025.     CT Lumbar Spine Reconstructed    Result Date: 1/16/2025  EXAM: CT CERVICAL SPINE  W/O CONTRAST, CT LUMBAR SPINE RECONSTRUCTED, CT THORACIC SPINE RECONSTRUCTED LOCATION: [facility] DATE: 1/16/2025 INDICATION: Back pain neck pain, numbness weakness unintended weight loss. COMPARISON: None. TECHNIQUE: 1) Routine CT Cervical Spine without IV contrast. Multiplanar reformats. Dose reduction techniques were used. 2) Dedicated axial, sagittal, and coronal images of the Thoracic Spine were generated utilizing CT chest source data and are separately reviewed. Dose reduction techniques were used. 3) Dedicated axial, sagittal, and coronal images of the Lumbar Spine were generated utilizing CT abdomen pelvis source data and are separately reviewed. Dose reduction techniques were used. FINDINGS: CERVICAL SPINE CT: VERTEBRA: Nondisplaced lucencies through the lamina and bilateral facets at C7. Heterogeneous sclerotic C7 vertebral body with mild height loss relative to the remainder of the cervical vertebral bodies. Preserved vertebral heights. Degenerative C4-C5 anterolisthesis. Otherwise preserved alignment. No traumatic subluxation. No acute fracture. No prevertebral edema. CANAL: No bony encroachment on the spinal canal. PARASPINAL: No acute extraspinal abnormality THORACIC SPINE CT: VERTEBRA: Subtle sclerotic foci involving the T3, T7 vertebral bodies. Preserved vertebral heights and alignment. No traumatic subluxation. No acute fracture. CANAL: No bony encroachment on the spinal canal. PARASPINAL: No acute extraspinal abnormality. LUMBAR SPINE CT: VERTEBRA: Radiographically intact L4-L5 interbody, pedicle screw and larry instrumented fusion. Sclerotic lesions within the L2, L3 and L4 vertebral bodies, bilateral iliac bones. Preserved vertebral heights and alignment. No traumatic subluxation. No acute fracture. CANAL: No bony encroachment on the spinal canal. PARASPINAL: No acute extraspinal abnormality.     IMPRESSION: CERVICAL SPINE CT: 1.  Bilateral nondisplaced C7 facet and lamina  fractures and irregularly sclerotic C7 vertebral body. Finding could represent a recent pathologic fracture in the setting of sclerotic metastasis. No traumatic subluxation. THORACIC SPINE CT: 1.  Negative for acute fracture or traumatic subluxation. 2.  Subtle sclerotic foci involving multiple vertebrae. Findings could represent sclerotic metastases. LUMBAR SPINE CT: 1.  Negative for acute fracture or traumatic subluxation. 2.  Subtle sclerotic lesions involving multiple vertebral bodies, sacrum and iliac bones. Findings could represent sclerotic metastases. Findings communicated to Dr. Wiggins by me at 1018 hours on 1/16/2025.      Total time spent was over 45 minutes.  This includes chart prep time, review of clinical data including notes from outside physicians, labs, review of medical imaging, discussion about  plan of care, documentation and .    Signed by: Timothy Sterling MD      This note has been dictated using voice recognition software. Any grammatical or context distortions are unintentional and inherent to the software

## 2025-02-01 LAB
TESTOST FREE SERPL-MCNC: 1.81 NG/DL
TESTOST SERPL-MCNC: 126 NG/DL (ref 240–950)

## 2025-02-03 ENCOUNTER — PATIENT OUTREACH (OUTPATIENT)
Dept: ONCOLOGY | Facility: HOSPITAL | Age: 80
End: 2025-02-03
Payer: COMMERCIAL

## 2025-02-03 ENCOUNTER — TELEPHONE (OUTPATIENT)
Dept: ONCOLOGY | Facility: HOSPITAL | Age: 80
End: 2025-02-03
Payer: COMMERCIAL

## 2025-02-03 DIAGNOSIS — C61 PROSTATE CANCER METASTATIC TO BONE (H): ICD-10-CM

## 2025-02-03 DIAGNOSIS — C79.51 PROSTATE CANCER METASTATIC TO BONE (H): ICD-10-CM

## 2025-02-03 DIAGNOSIS — R11.0 NAUSEA: Primary | ICD-10-CM

## 2025-02-03 RX ORDER — PROCHLORPERAZINE MALEATE 10 MG
10 TABLET ORAL EVERY 6 HOURS PRN
Qty: 30 TABLET | Refills: 1 | Status: SHIPPED | OUTPATIENT
Start: 2025-02-03

## 2025-02-03 NOTE — PROGRESS NOTES
[facility]: Cancer Care                                                                                            Situation: Patient chart reviewed by care coordinator.    Background: Patient with what looks to be a probable diagnosis of metastatic prostate cancer.  Patient started treatment with Casodex and Lupron 1/31/2025.    Assessment: Patient also started taking prednisone as this was at the pharmacy when he picked up his Casodex.  He did stop taking it as he stated that the prednisone made him feel bloated and constipated and he felt awful.  When our oral chemotherapy pharmacist talked with the patient's wife today she states that they would like something such as Linzess or Amitiza prescribed to help with the constipation.  I spoke with Mary James CNP about this who states that normally Sterling will start with senna S.  I updated the oral chemotherapy pharmacist on this and she states that the patient has tried senna S, mag citrate and some other OTC stool softeners without relief.    Plan/Recommendations: I tried calling the patient back today to further discuss this.  It went to voicemail.  I did leave a message letting him know who I was, where I was calling from and why I was calling.  I did ask him to give a call back so we can further discuss.  We would like to know how much senna as he has tried recently.  He can certainly go up to taking 2 tabs, 4 times daily as needed.  If he has done this we would then go to a half bottle of mag citrate and if after a couple of hours this does not work he can take the rest of the bottle of mag citrate.    If he has already done all of this, we can talk with Mary James CNP about what she would like to prescribe.    Signature:  Nasrin Nuñez RN

## 2025-02-03 NOTE — PROGRESS NOTES
[facility]: Cancer Care                                                                                          Patient called back into the clinic.  He states he is taking MiraLAX once a day.  He states that he is taking senna 1 tablet daily.  He did call in over the weekend and spoke with the on-call doctor regarding his stomach pain.  They told him to take Prilosec which seems to be helping.  I did let him know that since he is taking pain medication and if the prednisone also makes him constipated, he when he restarts the prednisone at the same time he starts his Zytiga, he should increase his senna to 2 tablets in the morning and 2 tablets in the evening when he takes his prednisone.  We can always titrate up or down from there if needed.    He also states he is having some nausea but has nothing at home for this.  I let him know that per our protocol I will go ahead and send in Compazine for him.  Patient is very appreciative of the call back and insight.  He knows to give us a call back if these changes do not help.    Signature:  Nasrin Nuñez RN

## 2025-02-03 NOTE — ORAL ONC MGMT
"  Oral Chemotherapy Monitoring Program    Lab Monitoring Plan    Subjective/Objective:  Kamille Gleason is a 79 year old male contacted by phone for an initial visit for oral chemotherapy education.          1/31/2025    10:00 AM 2/3/2025    10:00 AM   ORAL CHEMOTHERAPY   Assessment Type Initial Work up;Left Voicemail New Teach   Diagnosis Code Prostate Cancer Prostate Cancer   Providers Dr. Asher Sterling   Clinic Name/Location [facility]   Drug Name Zytiga (abiraterone) Zytiga (abiraterone)   Dose 1,000 mg 1,000 mg   Current Schedule Daily Daily   Cycle Details Continuous Continuous   Any new drug interactions? Yes Yes   Pharmacist Intervention? Yes Yes   Intervention(s) Patient Education Patient Education   Is the dose as ordered appropriate for the patient? Yes Yes       Last PHQ-2 Score on record:        No data to display                Vitals:  BP:   BP Readings from Last 1 Encounters:   01/31/25 137/66     Wt Readings from Last 1 Encounters:   01/30/25 74.2 kg (163 lb 8 oz)     Estimated body surface area is 1.86 meters squared as calculated from the following:    Height as of 1/30/25: 1.683 m (5' 6.25\").    Weight as of 1/30/25: 74.2 kg (163 lb 8 oz).    Labs:  _  Result Component Current Result Ref Range   Sodium 136 (1/30/2025) 135 - 145 mmol/L     _  Result Component Current Result Ref Range   Potassium 5.2 (1/30/2025) 3.4 - 5.3 mmol/L     _  Result Component Current Result Ref Range   Calcium 9.8 (1/30/2025) 8.8 - 10.4 mg/dL     _  Result Component Current Result Ref Range   Magnesium 2.1 (1/19/2025) 1.7 - 2.3 mg/dL     No results found for Phos within last 30 days.     _  Result Component Current Result Ref Range   Albumin 4.0 (1/30/2025) 3.5 - 5.2 g/dL     _  Result Component Current Result Ref Range   Urea Nitrogen 20.3 (1/30/2025) 8.0 - 23.0 mg/dL     _  Result Component Current Result Ref Range   Creatinine 0.86 (1/30/2025) 0.67 - 1.17 mg/dL     _  Result Component Current Result " Ref Range   AST 47 (H) (1/30/2025) 0 - 45 U/L     _  Result Component Current Result Ref Range   ALT 30 (1/30/2025) 0 - 70 U/L     _  Result Component Current Result Ref Range   Bilirubin Total 0.3 (1/30/2025) <=1.2 mg/dL     _  Result Component Current Result Ref Range   WBC Count 9.2 (1/19/2025) 4.0 - 11.0 10e3/uL     _  Result Component Current Result Ref Range   Hemoglobin 13.6 (1/19/2025) 13.3 - 17.7 g/dL     _  Result Component Current Result Ref Range   Platelet Count 293 (1/19/2025) 150 - 450 10e3/uL     No results found for ANC within last 30 days.     _  Result Component Current Result Ref Range   Absolute Neutrophils 6.5 (1/19/2025) 1.6 - 8.3 10e3/uL        Assessment:  Patient is appropriate to start therapy. Kamille has a BP cuff at home and will monitor his BP. Kamille had already started the prednisone last week and he said it made him very bloated and caused constipation and he felt awful so he stopped stopped taking it today. He did say he was taking it with food. We discussed that he doesn't need to take it until he starts abiraterone and we went over why he needs to take it with abiraterone. He said he would try it again and call us if it is affecting him poorly. He does have baseline constipation and uses senna, bisacodyl, magnesium citrate, psyllium and is still having constipation. He was asking about getting a prescription for Linzess as he has heard that works well for chronic constipation. I told him I would reach out to Dr. Sterling.    Plan:  Basic chemotherapy teaching was reviewed with the patient and the patient's wife including indication, start date of therapy, dose, administration, adverse effects, missed doses, food and drug interactions, monitoring, side effect management, office contact information, and safe handling. Written materials were provided and all questions answered.    Follow-Up:  We will call Kamille 7-10 days after he starts abiraterone.     Mariela Sanders,  PharmD  Oral Chemotherapy Pharmacist  [facility] Phone: [phone]  In Basket Pools:   KAM ORAL CHEMOTHERAPY PHARMACIST   KATHI ORAL CHEMOTHERAPY PHARMACIST

## 2025-02-03 NOTE — TELEPHONE ENCOUNTER
Date: February 3, 2025    Provider: MADIHA     Provider/Other: Radiology Department    Reason for out-going call:  HOSPITAL/ ED FOLLOW UP       Detailed message: Left voicemail to call clinic to schedule 4-6 week post ED (1/16/25) follow up with any MADIHA and to set up XR cervical spine 2/3 views prior to appointment.  Maxed Attempt Letter Sent          Number provider for patient: [facility]: [phone]

## 2025-02-03 NOTE — TELEPHONE ENCOUNTER
Prior Authorization Approval    Medication: ABIRATERONE ACETATE 250 MG PO TABS  Authorization Effective Date: 1/30/2025  Authorization Expiration Date: 12/31/2025  Approved Dose/Quantity: 120/30  Reference #: [id number]   Insurance Company: [employer] Part D - Phone [phone] Fax [phone]  Expected CoPay: $ 132.61

## 2025-02-03 NOTE — PROGRESS NOTES
[facility]: Cancer Care                                                                                          [employer] form has been filled out, signed by Mary James CNP and sent in.  This is for specimen ID # [id number] which was collected on 1/29/2025 at 8:30 AM from the left pelvic lymph node.  We are running this for prostate NGS fusion panel as well as BRCA 1/2 Mutation Analysis for Tumors.    Signature:  Nasrin Nuñez RN

## 2025-02-04 NOTE — TELEPHONE ENCOUNTER
[employer] APPROVED    Medication: ABIRATERONE ACETATE 250 MG PO TABS  Amount: $ 2,000  [facility] [place] Name: [facility] [place] Effective Date: 2/4/2025  Foundation Expiration Date: 12/31/2025  Patient Notified: Yes

## 2025-02-05 ENCOUNTER — TELEPHONE (OUTPATIENT)
Dept: ONCOLOGY | Facility: HOSPITAL | Age: 80
End: 2025-02-05
Payer: COMMERCIAL

## 2025-02-05 NOTE — TELEPHONE ENCOUNTER
"I received a phone call today from Roberto. He is calling because his back pain got \"a lot\" better after getting zometa and lupron on 1/31. However, today it seems to be getting worse again. He did take an oxycodone this morning and had to take another one just now. The oxycodone does help his pain. He is wondering if he is okay to use this much oxycodone. We talked about how the oxycodone is prescribed (1-2 tablets every 4 hours). We also talked about making sure his bowels are moving while taking oxycodone. He state he talked with [facility] about this yesterday and today he had a normal bowel movement. He will also try heat and ice. He denies any issues with ambulation. No problems with bowels or bladder control. He states the pain seems to be localized to his mid back. Today he rates the pain a 4/10 which per his report is still significantly better than before starting treatment. Roberto will continue to use oxycodone, heat/ice and monitor this pain. If it worsens he will call and let us know. Plan reviewed with Mary James CNP today and she agrees.     Christiane Fernandez RN on 2/5/2025 at 3:36 PM    "

## 2025-02-10 ENCOUNTER — TELEPHONE (OUTPATIENT)
Dept: ONCOLOGY | Facility: HOSPITAL | Age: 80
End: 2025-02-10
Payer: COMMERCIAL

## 2025-02-11 ENCOUNTER — PATIENT OUTREACH (OUTPATIENT)
Dept: ONCOLOGY | Facility: HOSPITAL | Age: 80
End: 2025-02-11
Payer: COMMERCIAL

## 2025-02-11 DIAGNOSIS — C79.51 PROSTATE CANCER METASTATIC TO BONE (H): Primary | ICD-10-CM

## 2025-02-11 DIAGNOSIS — C61 PROSTATE CANCER METASTATIC TO BONE (H): Primary | ICD-10-CM

## 2025-02-11 NOTE — PROGRESS NOTES
"[facility]: Cancer Care Follow-Up Note                                    Discussion with Patient:                                                      Patient had called in wanting to know now that the biopsy results are back if there is any change to his treatment plan.  When I called him back today, his cell phone went directly to [place].  He did tell our triage staff that it was okay to leave a detailed message as he was having troubles with his phone.    I did leave a detailed message letting him know that these were the results that Dr Sterling was expecting.  No change to his treatment plan as he had already been prepping for these results.     Goals          General    Maintain ability to perform ADLs without difficulty             Dates of Treatment:                                                      Infusion given in last 28 days       Administered MAR Action Medication Dose Rate Visit    01/31/2025 11:02 Given leuprolide (LUPRON DEPOT) kit 7.5 mg 7.5 mg   Infusion Therapy Visit on 01/31/2025 in [facility]          Treatment Plan Medications       OP ONC Prostate Cancer - Abiraterone (Zytiga) / Prednisone / Leuprolide       Take Home Medications       Medication Sig Start/End Day/Cycle Status    predniSONE (DELTASONE) 5 MG tablet Take 1 tablet (5 mg) by mouth daily. S to S+30 Day 1, Cycle 1 - 1/31/2025 Signed    abiraterone (ZYTIGA) 250 MG tablet Take 4 tablets (1,000 mg) by mouth daily for 30 doses. <span hidden class=\"HTML_HHS\"></span>Take on empty stomach. S to S+30 Day 1, Cycle 1 - 1/31/2025 Signed                          Intervention/Education provided during outreach:                                                       I did asked the patient to give us a call back if he has any further questions or concerns.  Otherwise I told him he should keep his appointment on 2/13 arriving at 10:45 AM for a lab only appointment.  I let him know this is in regards to " the oral medication he is taking from Dr Sterling.  I will double check on 2/13 to see if the oral chemotherapy pharmacy team will be reaching out to him after labs are resulted or if they would like me to do so.    Patient to follow up as scheduled at next appt.  Patient to call/[employer] message with updates.  Confirmed patient has clinic and triage numbers.    Signature:  Nasrin Nuñez RN

## 2025-02-13 ENCOUNTER — LAB (OUTPATIENT)
Dept: INFUSION THERAPY | Facility: HOSPITAL | Age: 80
End: 2025-02-13
Payer: COMMERCIAL

## 2025-02-13 ENCOUNTER — TELEPHONE (OUTPATIENT)
Dept: ONCOLOGY | Facility: HOSPITAL | Age: 80
End: 2025-02-13

## 2025-02-13 ENCOUNTER — PATIENT OUTREACH (OUTPATIENT)
Dept: ONCOLOGY | Facility: HOSPITAL | Age: 80
End: 2025-02-13

## 2025-02-13 DIAGNOSIS — C61 PROSTATE CANCER METASTATIC TO BONE (H): ICD-10-CM

## 2025-02-13 DIAGNOSIS — C79.51 PROSTATE CANCER METASTATIC TO BONE (H): ICD-10-CM

## 2025-02-13 LAB
ALBUMIN SERPL BCG-MCNC: 3.7 G/DL (ref 3.5–5.2)
ALP SERPL-CCNC: 1554 U/L (ref 40–150)
ALT SERPL W P-5'-P-CCNC: 18 U/L (ref 0–70)
ANION GAP SERPL CALCULATED.3IONS-SCNC: 7 MMOL/L (ref 7–15)
AST SERPL W P-5'-P-CCNC: 23 U/L (ref 0–45)
BILIRUB SERPL-MCNC: 0.4 MG/DL
BUN SERPL-MCNC: 15.3 MG/DL (ref 8–23)
CALCIUM SERPL-MCNC: 8.6 MG/DL (ref 8.8–10.4)
CHLORIDE SERPL-SCNC: 103 MMOL/L (ref 98–107)
CREAT SERPL-MCNC: 0.75 MG/DL (ref 0.67–1.17)
EGFRCR SERPLBLD CKD-EPI 2021: >90 ML/MIN/1.73M2
GLUCOSE SERPL-MCNC: 221 MG/DL (ref 70–99)
HCO3 SERPL-SCNC: 28 MMOL/L (ref 22–29)
POTASSIUM SERPL-SCNC: 4.1 MMOL/L (ref 3.4–5.3)
PROT SERPL-MCNC: 6.7 G/DL (ref 6.4–8.3)
SODIUM SERPL-SCNC: 138 MMOL/L (ref 135–145)

## 2025-02-13 PROCEDURE — 36415 COLL VENOUS BLD VENIPUNCTURE: CPT

## 2025-02-13 PROCEDURE — 80053 COMPREHEN METABOLIC PANEL: CPT

## 2025-02-13 NOTE — PROGRESS NOTES
"[facility]: Cancer Care Follow-Up Note                                    Discussion with Patient:                                                      Patient comes in today for for a lab only appointment.  He and his wife had many questions about why he was not receiving any medications today.     Steroid use/side effect: Prednisone 5 mg daily    Medication understanding/side effect: Gas-X, MiraLAX, senna S, milk of magnesia    Medications he has received and how he will be receiving them going forward: The information below is what I printed out in \"patient instructions\" and gave to them  You have received a 1 month dose of Lupron (7.5 mg) on January 31 and your monthly zometa infusion.       On February 26, you will still be coming in to have labs drawn and get your monthly Zometa infusion (bone strengthener) as well as a 3 month dose of Lupron (22.5mg).       Going forward, you will receive the zometa monthly and the lupron every 3 months.          predniSONE (DELTASONE) 5 MG tablet   Take 1 tablet (5 mg) by mouth daily       abiraterone (ZYTIGA) 250 MG tablet   Take 4 tablets (1,000 mg) by mouth daily for 30 doses. Take on empty stomach               Goals          General    Maintain ability to perform ADLs without difficulty             Dates of Treatment:                                                      Infusion given in last 28 days       Administered MAR Action Medication Dose Rate Visit    01/31/2025 11:02 Given leuprolide (LUPRON DEPOT) kit 7.5 mg 7.5 mg   Infusion Therapy Visit on 01/31/2025 in [facility]          Treatment Plan Medications       OP ONC Prostate Cancer - Abiraterone (Zytiga) / Prednisone / Leuprolide       Take Home Medications       Medication Sig Start/End Day/Cycle Status    predniSONE (DELTASONE) 5 MG tablet Take 1 tablet (5 mg) by mouth daily. S to S+30 Day 1, Cycle 2 - 3/2/2025 Signed    abiraterone (ZYTIGA) 250 MG tablet Take 4 tablets " "(1,000 mg) by mouth daily for 30 doses. <span hidden class=\"HTML_HHS\"></span>Take on empty stomach. S to S+30 Day 1, Cycle 2 - 3/2/2025 Signed                            Assessment:                                                      They were able to ask questions and understood the information given to them.    Intervention/Education provided during outreach:                                                       An AVS was printed from today's visit with the above patient instructions.  This also printed upcoming appointments.    Patient to follow up as scheduled at next appt.  Patient to call/[employer] message with updates.  Confirmed patient has clinic and triage numbers.    Signature:  Nasrin Nuñez RN  "

## 2025-02-13 NOTE — ORAL ONC MGMT
Oral Chemotherapy Monitoring Program   Left Voicemail    Attempted to contact patient today for follow up regarding oral chemotherapy, abiraterone, for assessment. No answer. Left voicemail for patient to call us back at [phone] when able. No medication name was left.    Also let him know his lab work from earlier this morning looks good.   Sent [employer] message also.     Mariela Sanders, Zackary  Oral Chemotherapy Pharmacist  [facility] Phone: [phone]  In Basket Pools:   KAM ORAL CHEMOTHERAPY PHARMACIST   KATHI ORAL CHEMOTHERAPY PHARMACIST

## 2025-02-13 NOTE — PATIENT INSTRUCTIONS
You have received a 1 month dose of Lupron (7.5 mg) on January 31 and your monthly zometa infusion.      On February 26, you will still be coming in to have labs drawn and get your monthly Zometa infusion (bone strengthener) as well as a 3 month dose of Lupron (22.5mg).      Going forward, you will receive the zometa monthly and the lupron every 3 months.        predniSONE (DELTASONE) 5 MG tablet   Take 1 tablet (5 mg) by mouth daily      abiraterone (ZYTIGA) 250 MG tablet   Take 4 tablets (1,000 mg) by mouth daily for 30 doses. Take on empty stomach

## 2025-02-19 ENCOUNTER — TELEPHONE (OUTPATIENT)
Dept: ONCOLOGY | Facility: HOSPITAL | Age: 80
End: 2025-02-19
Payer: COMMERCIAL

## 2025-02-19 NOTE — TELEPHONE ENCOUNTER
I received a phone call today from Radha at [employer].  She is calling because she received a requisition form as well as pathology on this patient.  She is needing office visit notes to get faxed over as well.  I faxed over office notes from Dr. Sterling's  1/23/25 visit as well as 1/30/2025.  They were faxed to Radha @ [employer] at [phone].    Christiane Fernandez RN on 2/19/2025 at 11:55 AM

## 2025-02-25 DIAGNOSIS — M84.48XA PATHOLOGICAL FRACTURE OF CERVICAL VERTEBRA, INITIAL ENCOUNTER: ICD-10-CM

## 2025-02-25 DIAGNOSIS — C61 PROSTATE CANCER METASTATIC TO BONE (H): Primary | ICD-10-CM

## 2025-02-25 DIAGNOSIS — C79.51 PROSTATE CANCER METASTATIC TO BONE (H): Primary | ICD-10-CM

## 2025-02-25 RX ORDER — HEPARIN SODIUM,PORCINE 10 UNIT/ML
5-20 VIAL (ML) INTRAVENOUS DAILY PRN
OUTPATIENT
Start: 2025-04-25

## 2025-02-25 RX ORDER — HEPARIN SODIUM (PORCINE) LOCK FLUSH IV SOLN 100 UNIT/ML 100 UNIT/ML
5 SOLUTION INTRAVENOUS
OUTPATIENT
Start: 2025-04-25

## 2025-02-25 RX ORDER — HEPARIN SODIUM,PORCINE 10 UNIT/ML
5-20 VIAL (ML) INTRAVENOUS DAILY PRN
OUTPATIENT
Start: 2025-06-20

## 2025-02-25 RX ORDER — HEPARIN SODIUM (PORCINE) LOCK FLUSH IV SOLN 100 UNIT/ML 100 UNIT/ML
5 SOLUTION INTRAVENOUS
OUTPATIENT
Start: 2025-05-23

## 2025-02-25 RX ORDER — HEPARIN SODIUM,PORCINE 10 UNIT/ML
5-20 VIAL (ML) INTRAVENOUS DAILY PRN
OUTPATIENT
Start: 2025-02-28

## 2025-02-25 RX ORDER — HEPARIN SODIUM,PORCINE 10 UNIT/ML
5-20 VIAL (ML) INTRAVENOUS DAILY PRN
OUTPATIENT
Start: 2025-03-28

## 2025-02-25 RX ORDER — HEPARIN SODIUM,PORCINE 10 UNIT/ML
5-20 VIAL (ML) INTRAVENOUS DAILY PRN
OUTPATIENT
Start: 2025-05-23

## 2025-02-25 RX ORDER — HEPARIN SODIUM (PORCINE) LOCK FLUSH IV SOLN 100 UNIT/ML 100 UNIT/ML
5 SOLUTION INTRAVENOUS
OUTPATIENT
Start: 2025-02-28

## 2025-02-25 RX ORDER — HEPARIN SODIUM (PORCINE) LOCK FLUSH IV SOLN 100 UNIT/ML 100 UNIT/ML
5 SOLUTION INTRAVENOUS
OUTPATIENT
Start: 2025-03-28

## 2025-02-25 RX ORDER — HEPARIN SODIUM (PORCINE) LOCK FLUSH IV SOLN 100 UNIT/ML 100 UNIT/ML
5 SOLUTION INTRAVENOUS
OUTPATIENT
Start: 2025-06-20

## 2025-02-26 LAB
PATH REPORT.ADDENDUM SPEC: ABNORMAL
PATH REPORT.COMMENTS IMP SPEC: ABNORMAL
PATH REPORT.COMMENTS IMP SPEC: YES
PATH REPORT.FINAL DX SPEC: ABNORMAL
PATH REPORT.GROSS SPEC: ABNORMAL
PATH REPORT.MICROSCOPIC SPEC OTHER STN: ABNORMAL
PATH REPORT.RELEVANT HX SPEC: ABNORMAL
PHOTO IMAGE: ABNORMAL

## 2025-02-28 DIAGNOSIS — M84.48XA PATHOLOGICAL FRACTURE OF CERVICAL VERTEBRA, INITIAL ENCOUNTER: ICD-10-CM

## 2025-02-28 NOTE — TELEPHONE ENCOUNTER
Roberto is calling requesting a refill of oxycodone. He states he is currently taking it 2-3 times per day as needed for pain. It was last refilled 1/30/25, #60, 0 refills. Will send this request to Dr. Sterling to review and refill.     Christiane Fernandez RN on 2/28/2025 at 2:04 PM

## 2025-03-03 ENCOUNTER — TELEPHONE (OUTPATIENT)
Dept: ONCOLOGY | Facility: HOSPITAL | Age: 80
End: 2025-03-03
Payer: COMMERCIAL

## 2025-03-03 RX ORDER — OXYCODONE HYDROCHLORIDE 5 MG/1
5-10 TABLET ORAL EVERY 4 HOURS PRN
Qty: 60 TABLET | Refills: 0 | Status: SHIPPED | OUTPATIENT
Start: 2025-03-03

## 2025-03-03 NOTE — TELEPHONE ENCOUNTER
I received a phone call today from [facility].  He is calling to check on the status of his refill request for oxycodone.  I let him know that Dr. Sterling sent this over to the pharmacy this morning.  He was happy to hear this and has no other questions at this time.    Christiane Fernandez RN on 3/3/2025 at 10:23 AM

## 2025-03-04 ENCOUNTER — TELEPHONE (OUTPATIENT)
Dept: ONCOLOGY | Facility: HOSPITAL | Age: 80
End: 2025-03-04
Payer: COMMERCIAL

## 2025-03-04 ENCOUNTER — LAB (OUTPATIENT)
Dept: INFUSION THERAPY | Facility: HOSPITAL | Age: 80
End: 2025-03-04
Payer: COMMERCIAL

## 2025-03-04 DIAGNOSIS — R30.0 BURNING WITH URINATION: ICD-10-CM

## 2025-03-04 DIAGNOSIS — R30.0 BURNING WITH URINATION: Primary | ICD-10-CM

## 2025-03-04 LAB
ALBUMIN UR-MCNC: 20 MG/DL
APPEARANCE UR: CLEAR
BILIRUB UR QL STRIP: NEGATIVE
COLOR UR AUTO: YELLOW
GLUCOSE UR STRIP-MCNC: NEGATIVE MG/DL
HGB UR QL STRIP: NEGATIVE
KETONES UR STRIP-MCNC: ABNORMAL MG/DL
LEUKOCYTE ESTERASE UR QL STRIP: NEGATIVE
MUCOUS THREADS #/AREA URNS LPF: PRESENT /LPF
NITRATE UR QL: NEGATIVE
PH UR STRIP: 5.5 [PH] (ref 5–7)
RBC URINE: 2 /HPF
SP GR UR STRIP: 1.03 (ref 1–1.03)
UROBILINOGEN UR STRIP-MCNC: <2 MG/DL
WBC URINE: 2 /HPF

## 2025-03-04 PROCEDURE — 81001 URINALYSIS AUTO W/SCOPE: CPT

## 2025-03-04 NOTE — TELEPHONE ENCOUNTER
I called Roberto back to let him know that his urine looked okay today, no infection. He was happy to hear this. He agrees to push fluids for the next couple of days and call back if the burning sensation continues.     Christiane Fernandez RN on 3/4/2025 at 1:34 PM

## 2025-03-04 NOTE — TELEPHONE ENCOUNTER
I received a phone call today from Roberto.  He is calling to report that for the last 2 days he has had burning with urination.  He also states that he is urinating more frequently, about every hour.  He denies any blood in his urine.  No new odor.  I let him know I would review this with Mary James CNP today and give him a call back.  He is willing to come into the clinic to leave a urine sample if needed.  Message sent to JANET Hernandez; waiting to hear back.     Christiane Fernandez RN on 3/4/2025 at 9:59 AM

## 2025-03-04 NOTE — TELEPHONE ENCOUNTER
Per Mary aJmes, CNP  - okay to order UA/UC. Order placed. Roberto is scheduled to come in today to leave some urine. Will route this message to BARRY Alexandra as an FYI and to help watch for results.     Christiane Fernandez RN on 3/4/2025 at 10:26 AM

## 2025-03-05 ENCOUNTER — TELEPHONE (OUTPATIENT)
Dept: ONCOLOGY | Facility: HOSPITAL | Age: 80
End: 2025-03-05
Payer: COMMERCIAL

## 2025-03-05 DIAGNOSIS — C79.51 PROSTATE CANCER METASTATIC TO BONE (H): Primary | ICD-10-CM

## 2025-03-05 DIAGNOSIS — C61 PROSTATE CANCER METASTATIC TO BONE (H): Primary | ICD-10-CM

## 2025-03-05 RX ORDER — PREDNISONE 5 MG/1
5 TABLET ORAL 2 TIMES DAILY
Qty: 60 TABLET | Refills: 0 | Status: SHIPPED | OUTPATIENT
Start: 2025-03-16 | End: 2025-04-15

## 2025-03-05 NOTE — TELEPHONE ENCOUNTER
I received a phone call today from Roberto.  He is calling to check on his prednisone prescription.  He states he was taking prednisone twice daily but the most recent prescription he received has directions to take it once daily.  I reviewed this with Dr. Sterling and he would like the patient taking prednisone twice daily.  I called Roberto back with this information.  He verbalized understanding and agrees to take the prednisone twice a day.  I let him know we will get an updated prescription sent over to the pharmacy today.  He has no other questions at this time.    Christiane Fernandez RN on 3/5/2025 at 2:24 PM

## 2025-03-05 NOTE — ORAL ONC MGMT
"Oral Chemotherapy Monitoring Program   Update prednisone to 5 mg BID. Signed and released script for patient.     Amparo Burgess, PharmD, BCOP  Oral Chemotherapy Pharmacist  [facility] Phone: [phone]  In Basket Pools: \"[facility] ORAL CHEMOTHERAPY PHARMACIST\" or \"[facility] ORAL CHEMOTHERAPY PHARMACIST\"  March 5, 2025       "

## 2025-03-09 ENCOUNTER — HEALTH MAINTENANCE LETTER (OUTPATIENT)
Age: 80
End: 2025-03-09

## 2025-03-10 ENCOUNTER — TELEPHONE (OUTPATIENT)
Dept: ONCOLOGY | Facility: HOSPITAL | Age: 80
End: 2025-03-10
Payer: COMMERCIAL

## 2025-03-10 NOTE — TELEPHONE ENCOUNTER
"Received a voicemail from [facility] this morning stating he had, \"medication questions/issues.\" Did not leave any information at that time as to what questions he had. Called patient back with no answer. Left a message asking him to call back so we could help get those answered.       Jada Ferrera RN on 3/10/2025 at 11:50 AM    "

## 2025-03-11 ENCOUNTER — TELEPHONE (OUTPATIENT)
Dept: ONCOLOGY | Facility: HOSPITAL | Age: 80
End: 2025-03-11
Payer: COMMERCIAL

## 2025-03-11 NOTE — TELEPHONE ENCOUNTER
Attempted to call Roberto again today to see what questions he has regarding his medications. Left a message requesting he call the clinic back if he has unanswered questions.     Christiane Fernandez RN on 3/11/2025 at 2:15 PM

## 2025-03-11 NOTE — TELEPHONE ENCOUNTER
I received a phone call today from Roberto.  He is calling to get some advice.  He states he has been on tramadol for a Workmen's Comp. issue.  He states he had herniated some disks in his lower back and has been on tramadol to help with this pain.  His primary care doctor is reluctant to continue to prescribe tramadol since he is taking oxycodone.  Per Roberto, he uses tramadol twice a day if needed and if his low back pain is a 4 or less.  He will take an oxycodone if needed and only if his pain is above a 5.  He states the max oxycodone he will take in a day is 2.  He would like to continue to use tramadol as needed for his low back pain and use the oxycodone as needed for any breakthrough/cancer related pain.  He wants to know what Dr. Sterling or Mary James CNP feel about this plan.  He does not necessarily want our providers to take over the tramadol prescription because it is a Workmen's Comp. medication and is being covered at 100% at this time. But, he is hoping to get some guidance in what he can tell his PCP going forward. Will route this request to Mary James CNP to advise.     Christiane Fernandez RN on 3/11/2025 at 3:42 PM

## 2025-03-12 NOTE — TELEPHONE ENCOUNTER
"I called Roberto to let him know that Mary James CNP reviewed this message and feels like his plan is reasonable. Roberto was happy to hear this. He will call his PCP today to discuss this again.     Also, he was very happy to see his recent PSA results. He states he is feeling \"much better\" overall and is pleased to see he is responding to the treatment.     Christiane Fernandez RN on 3/12/2025 at 8:48 AM    "

## 2025-03-17 ENCOUNTER — TELEPHONE (OUTPATIENT)
Dept: ONCOLOGY | Facility: HOSPITAL | Age: 80
End: 2025-03-17

## 2025-03-17 ENCOUNTER — LAB (OUTPATIENT)
Dept: INFUSION THERAPY | Facility: HOSPITAL | Age: 80
End: 2025-03-17
Payer: COMMERCIAL

## 2025-03-17 DIAGNOSIS — C79.51 PROSTATE CANCER METASTATIC TO BONE (H): ICD-10-CM

## 2025-03-17 DIAGNOSIS — C61 PROSTATE CANCER METASTATIC TO BONE (H): ICD-10-CM

## 2025-03-17 LAB
ALBUMIN SERPL BCG-MCNC: 4.2 G/DL (ref 3.5–5.2)
ALP SERPL-CCNC: 754 U/L (ref 40–150)
ALT SERPL W P-5'-P-CCNC: 182 U/L (ref 0–70)
ANION GAP SERPL CALCULATED.3IONS-SCNC: 7 MMOL/L (ref 7–15)
AST SERPL W P-5'-P-CCNC: 77 U/L (ref 0–45)
BILIRUB SERPL-MCNC: 0.5 MG/DL
BUN SERPL-MCNC: 14.8 MG/DL (ref 8–23)
CALCIUM SERPL-MCNC: 8.9 MG/DL (ref 8.8–10.4)
CHLORIDE SERPL-SCNC: 104 MMOL/L (ref 98–107)
CREAT SERPL-MCNC: 0.82 MG/DL (ref 0.67–1.17)
EGFRCR SERPLBLD CKD-EPI 2021: 90 ML/MIN/1.73M2
GLUCOSE SERPL-MCNC: 156 MG/DL (ref 70–99)
HCO3 SERPL-SCNC: 29 MMOL/L (ref 22–29)
POTASSIUM SERPL-SCNC: 4.5 MMOL/L (ref 3.4–5.3)
PROT SERPL-MCNC: 7.1 G/DL (ref 6.4–8.3)
SODIUM SERPL-SCNC: 140 MMOL/L (ref 135–145)

## 2025-03-17 PROCEDURE — 80053 COMPREHEN METABOLIC PANEL: CPT

## 2025-03-17 PROCEDURE — 36415 COLL VENOUS BLD VENIPUNCTURE: CPT

## 2025-03-17 NOTE — ORAL ONC MGMT
Oral Chemotherapy Monitoring Program  Lab Follow Up    Reviewed lab results from 3/17/2025.        1/31/2025    10:00 AM 2/3/2025    10:00 AM 2/13/2025    11:00 AM 2/28/2025    12:00 PM 3/17/2025     2:00 PM   ORAL CHEMOTHERAPY   Assessment Type Initial Work up;Left Voicemail New Teach Left Voicemail;Lab Monitoring Refill Lab Monitoring   Diagnosis Code Prostate Cancer Prostate Cancer Prostate Cancer Prostate Cancer Prostate Cancer   Providers Dr. Asher Sterling   Clinic Name/Location [facility]   Drug Name Zytiga (abiraterone) Zytiga (abiraterone) Zytiga (abiraterone) Zytiga (abiraterone) Zytiga (abiraterone)   Dose 1,000 mg 1,000 mg 1,000 mg 1,000 mg 1,000 mg   Current Schedule Daily Daily Daily Daily Daily   Cycle Details Continuous Continuous Continuous Continuous Continuous   Any new drug interactions? Yes Yes      Pharmacist Intervention? Yes Yes      Intervention(s) Patient Education Patient Education      Is the dose as ordered appropriate for the patient? Yes Yes          Labs:  _  Result Component Current Result Ref Range   Sodium 140 (3/17/2025) 135 - 145 mmol/L     _  Result Component Current Result Ref Range   Potassium 4.5 (3/17/2025) 3.4 - 5.3 mmol/L     _  Result Component Current Result Ref Range   Calcium 8.9 (3/17/2025) 8.8 - 10.4 mg/dL          No results found for Mag within last 30 days.     No results found for Phos within last 30 days.     _  Result Component Current Result Ref Range   Albumin 4.2 (3/17/2025) 3.5 - 5.2 g/dL     _  Result Component Current Result Ref Range   Urea Nitrogen 14.8 (3/17/2025) 8.0 - 23.0 mg/dL     _  Result Component Current Result Ref Range   Creatinine 0.82 (3/17/2025) 0.67 - 1.17 mg/dL     _  Result Component Current Result Ref Range   AST 77 (H) (3/17/2025) 0 - 45 U/L     _  Result Component Current Result Ref Range    (H) (3/17/2025) 0 - 70 U/L     _  Result Component  Current Result Ref Range   Bilirubin Total 0.5 (3/17/2025) <=1.2 mg/dL     _  Result Component Current Result Ref Range   WBC Count 8.8 (2/28/2025) 4.0 - 11.0 10e3/uL     _  Result Component Current Result Ref Range   Hemoglobin 13.8 (2/28/2025) 13.3 - 17.7 g/dL     _  Result Component Current Result Ref Range   Platelet Count 227 (2/28/2025) 150 - 450 10e3/uL     No results found for ANC within last 30 days.     No results found for ANC within last 30 days.        Assessment & Plan:  Alkaline phosphatase elevated at 754, but decreasing from prior (2,089 on 2/28/25). ALT and AST trending up; continue to monitor.     Lab results sent via [employer]    Follow-Up:  Review 3/26 Mary appointment with labs, then schedule monthly assessment two weeks after.    Wilson Toledo, PharmD  PGY2 Oncology Pharmacy Resident

## 2025-03-26 ENCOUNTER — PATIENT OUTREACH (OUTPATIENT)
Dept: ONCOLOGY | Facility: HOSPITAL | Age: 80
End: 2025-03-26

## 2025-03-26 ENCOUNTER — ONCOLOGY VISIT (OUTPATIENT)
Dept: ONCOLOGY | Facility: HOSPITAL | Age: 80
End: 2025-03-26
Attending: INTERNAL MEDICINE
Payer: COMMERCIAL

## 2025-03-26 ENCOUNTER — INFUSION THERAPY VISIT (OUTPATIENT)
Dept: INFUSION THERAPY | Facility: HOSPITAL | Age: 80
End: 2025-03-26
Attending: NURSE PRACTITIONER
Payer: COMMERCIAL

## 2025-03-26 VITALS
RESPIRATION RATE: 16 BRPM | OXYGEN SATURATION: 97 % | HEART RATE: 86 BPM | WEIGHT: 165.9 LBS | BODY MASS INDEX: 26.58 KG/M2 | SYSTOLIC BLOOD PRESSURE: 149 MMHG | TEMPERATURE: 98.8 F | DIASTOLIC BLOOD PRESSURE: 67 MMHG

## 2025-03-26 DIAGNOSIS — C61 PROSTATE CANCER METASTATIC TO BONE (H): Primary | ICD-10-CM

## 2025-03-26 DIAGNOSIS — M84.48XA PATHOLOGICAL FRACTURE OF CERVICAL VERTEBRA, INITIAL ENCOUNTER: Primary | ICD-10-CM

## 2025-03-26 DIAGNOSIS — C79.51 PROSTATE CANCER METASTATIC TO BONE (H): Primary | ICD-10-CM

## 2025-03-26 DIAGNOSIS — C79.51 PROSTATE CANCER METASTATIC TO BONE (H): ICD-10-CM

## 2025-03-26 DIAGNOSIS — M84.48XA PATHOLOGICAL FRACTURE OF CERVICAL VERTEBRA, INITIAL ENCOUNTER: ICD-10-CM

## 2025-03-26 DIAGNOSIS — C61 PROSTATE CANCER METASTATIC TO BONE (H): ICD-10-CM

## 2025-03-26 LAB
ALBUMIN SERPL BCG-MCNC: 4 G/DL (ref 3.5–5.2)
ALP SERPL-CCNC: 514 U/L (ref 40–150)
ALT SERPL W P-5'-P-CCNC: 86 U/L (ref 0–70)
ANION GAP SERPL CALCULATED.3IONS-SCNC: 6 MMOL/L (ref 7–15)
AST SERPL W P-5'-P-CCNC: 35 U/L (ref 0–45)
BILIRUB SERPL-MCNC: 0.4 MG/DL
BUN SERPL-MCNC: 19.3 MG/DL (ref 8–23)
CALCIUM SERPL-MCNC: 8.7 MG/DL (ref 8.8–10.4)
CALCIUM SERPL-MCNC: 8.7 MG/DL (ref 8.8–10.4)
CHLORIDE SERPL-SCNC: 104 MMOL/L (ref 98–107)
CREAT SERPL-MCNC: 0.83 MG/DL (ref 0.67–1.17)
EGFRCR SERPLBLD CKD-EPI 2021: 90 ML/MIN/1.73M2
GLUCOSE SERPL-MCNC: 171 MG/DL (ref 70–99)
HCO3 SERPL-SCNC: 27 MMOL/L (ref 22–29)
POTASSIUM SERPL-SCNC: 4.6 MMOL/L (ref 3.4–5.3)
PROT SERPL-MCNC: 6.8 G/DL (ref 6.4–8.3)
PSA SERPL DL<=0.01 NG/ML-MCNC: 2.13 NG/ML (ref 0–6.5)
SODIUM SERPL-SCNC: 137 MMOL/L (ref 135–145)

## 2025-03-26 PROCEDURE — 96365 THER/PROPH/DIAG IV INF INIT: CPT

## 2025-03-26 PROCEDURE — G2211 COMPLEX E/M VISIT ADD ON: HCPCS | Performed by: NURSE PRACTITIONER

## 2025-03-26 PROCEDURE — 250N000011 HC RX IP 250 OP 636: Performed by: INTERNAL MEDICINE

## 2025-03-26 PROCEDURE — 84153 ASSAY OF PSA TOTAL: CPT

## 2025-03-26 PROCEDURE — 36415 COLL VENOUS BLD VENIPUNCTURE: CPT | Performed by: INTERNAL MEDICINE

## 2025-03-26 PROCEDURE — 258N000003 HC RX IP 258 OP 636: Performed by: INTERNAL MEDICINE

## 2025-03-26 PROCEDURE — 80053 COMPREHEN METABOLIC PANEL: CPT

## 2025-03-26 PROCEDURE — 99215 OFFICE O/P EST HI 40 MIN: CPT | Performed by: NURSE PRACTITIONER

## 2025-03-26 PROCEDURE — G0463 HOSPITAL OUTPT CLINIC VISIT: HCPCS | Performed by: NURSE PRACTITIONER

## 2025-03-26 RX ORDER — ZOLEDRONIC ACID 0.04 MG/ML
4 INJECTION, SOLUTION INTRAVENOUS ONCE
Status: COMPLETED | OUTPATIENT
Start: 2025-03-26 | End: 2025-03-26

## 2025-03-26 RX ADMIN — ZOLEDRONIC ACID 4 MG: 0.04 INJECTION, SOLUTION INTRAVENOUS at 11:21

## 2025-03-26 RX ADMIN — SODIUM CHLORIDE 250 ML: 0.9 INJECTION, SOLUTION INTRAVENOUS at 11:21

## 2025-03-26 ASSESSMENT — PAIN SCALES - GENERAL: PAINLEVEL_OUTOF10: MILD PAIN (3)

## 2025-03-26 NOTE — PROGRESS NOTES
"Oncology Rooming Note    March 26, 2025 10:18 AM   Kaimlle Gleason is a 79 year old male who presents for:    Chief Complaint   Patient presents with    Oncology Clinic Visit     Return visit with lab and infusion. Prostate cancer metastatic to bone.     Initial Vitals: BP (!) 149/67 (BP Location: Left arm, Patient Position: Sitting, Cuff Size: Adult Regular)   Pulse 86   Temp 98.8  F (37.1  C) (Oral)   Resp 16   Wt 75.3 kg (165 lb 14.4 oz)   SpO2 97%   BMI 26.58 kg/m   Estimated body mass index is 26.58 kg/m  as calculated from the following:    Height as of 1/30/25: 1.683 m (5' 6.25\").    Weight as of this encounter: 75.3 kg (165 lb 14.4 oz). Body surface area is 1.88 meters squared.  Mild Pain (3) Comment: Data Unavailable   No LMP for male patient.  Allergies reviewed: Yes  Medications reviewed: Yes    Medications: Medication refills not needed today.  Pharmacy name entered into [employer]: [id number] [address] AT [facility]    Frailty Screening:   Is the patient here for a new oncology consult visit in cancer care? 2. No    PHQ9:  Did this patient require a PHQ9?: No      Clinical concerns: lower back pain 2/10. Left shoulder pain radiating into chest 3/10. Mary James CNP was notified.      Luda Duong CMA            "

## 2025-03-26 NOTE — PROGRESS NOTES
[facility] Hematology and Oncology Progress Note    Patient: Kamille Gleason  MRN: [id number]  Date of Service: Mar 26, 2025          Reason for Visit    Chief Complaint   Patient presents with    Oncology Clinic Visit     Return visit with lab and infusion. Prostate cancer metastatic to bone.       Assessment and Plan     Cancer Staging   No matching staging information was found for the patient.    Prostate cancer, metastatic disease with bone, lymph node mets: Patient has now started on Lupron, Zytiga and prednisone.  His PSA has responded quite nicely coming down from 120, down to 29 last month.  It is still pending from today.  Review his labs with him today including his CMP that shows an improvement in his liver function.  His alk phos which is quite elevated at the time of his diagnosis likely due to bone mets is also significantly improving.  No kidney dysfunction.  His electrolytes look good.  Will continue his current regimen.  He will continue the Lupron every 3 months.  He will be due for his next dose on May 22.  He will see Dr. Sterling at that time.    Bone mets: Patient is currently getting Zometa every month.  I did tell him we will likely do that for 6 to 12 months and then we will likely switch to every 3 months.    Scapular pain that radiates to chest: Unclear etiology.  I did tell patient that if this does happen and it is lasting more than a couple minutes he probably should go to urgent care to get his heart checked out.  He does take oxycodone and nitro as needed.  Does have a cardiology appointment coming up so I did tell him to talk about his symptom as well.    ECOG Performance    0 - Independent    Distress Screening (within last 30 days)    No data recorded     Pain  Pain Score: Mild Pain (3)  Pain Loc: Shoulder    Problem List    Patient Active Problem List   Diagnosis    Chest pain    S/P CABG x 3    Coronary artery disease involving native coronary artery of native heart with  angina pectoris    Mixed hyperlipidemia    Type 2 diabetes mellitus without complication, without long-term current use of insulin (H)    Diaphoresis    Chest pain, unspecified type    Pathological fracture of cervical vertebra, initial encounter    Prostate cancer metastatic to bone (H)        ______________________________________________________________________________    History of Present Illness    Oncologist: Dr. Sterling    Diagnosis: Prostate cancer.  Patient presented to the ER in January 2025 with acute back pain.  Imaging showed sclerotic lesions in bones.  Patient had a PSA done at that time that was 544.  -1/16/25: Patient had a CT scan in the ER that showed lymphadenopathy in the chest abdomen and pelvis likely metastatic.  Sclerotic osseous mets.  -1/28/2025: Patient had a PSMA PET scan that showed findings suspicious for primary prostate malignancy with metastases involving several lymph node sites above and below the diaphragm, both lungs and numerous scattered sites in the skeleton.  -1/29/2025: Patient had a biopsy of a left pelvic lymph node that was consistent with metastatic adenocarcinoma of the prostate gland.    Treatment:  -1/31/2025: Patient started Lupron.  Patient also started Casodex (daily for 14 days).  Zytiga 1000 mg daily and prednisone 5 mg twice a day.    ~Supportive cares.  Patient started Zometa.  He is getting 4 mg once a month    Interim history:  Patient is here today for a follow-up visit.  He is now been on treatment for about 2 months.  Overall he is really happy with how he is doing.  He is been following along with his labs and he has noticed his PSA is significantly improved.  He states that his pain is probably improved by about 80%.  He denies any new bone or back pain issues.  He states that he does still have a little bit of pain in his back.  He says sometimes he has some twinges of pain in his left scapula and it goes into his heart sometimes.  He usually takes  oxycodone and that helps.  He does also have a history of heart issues so he does take nitro from time to time.  He says he does feel some weakness in his hands and arms and legs.  He feels like his appetite is good but he has lost some weight.      Review of Systems    Pertinent items are noted in HPI.    Past History    Past Medical History:   Diagnosis Date    CAD (coronary artery disease)     Hyperlipidemia        PHYSICAL EXAM  BP (!) 149/67 (BP Location: Left arm, Patient Position: Sitting, Cuff Size: Adult Regular)   Pulse 86   Temp 98.8  F (37.1  C) (Oral)   Resp 16   Wt 75.3 kg (165 lb 14.4 oz)   SpO2 97%   BMI 26.58 kg/m      GENERAL: no acute distress. Cooperative in conversation.  In Clinic with wife and daughter  RESP: Regular respiratory rate. No expiratory wheezes   NEURO: non focal. Alert and oriented x3.   PSYCH: within normal limits. No depression or anxiety.  SKIN: exposed skin is dry intact.     Lab Results    Recent Results (from the past week)   Calcium   Result Value Ref Range    Calcium 8.7 (L) 8.8 - 10.4 mg/dL   Comprehensive metabolic panel   Result Value Ref Range    Sodium 137 135 - 145 mmol/L    Potassium 4.6 3.4 - 5.3 mmol/L    Carbon Dioxide (CO2) 27 22 - 29 mmol/L    Anion Gap 6 (L) 7 - 15 mmol/L    Urea Nitrogen 19.3 8.0 - 23.0 mg/dL    Creatinine 0.83 0.67 - 1.17 mg/dL    GFR Estimate 90 >60 mL/min/1.73m2    Calcium 8.7 (L) 8.8 - 10.4 mg/dL    Chloride 104 98 - 107 mmol/L    Glucose 171 (H) 70 - 99 mg/dL    Alkaline Phosphatase 514 (H) 40 - 150 U/L    AST 35 0 - 45 U/L    ALT 86 (H) 0 - 70 U/L    Protein Total 6.8 6.4 - 8.3 g/dL    Albumin 4.0 3.5 - 5.2 g/dL    Bilirubin Total 0.4 <=1.2 mg/dL     Lab Results   Component Value Date    PSA 29.30 (H) 02/28/2025    .00 (H) 01/30/2025    .00 (H) 01/16/2025    .00 (H) 01/16/2025   ]  Imaging    No results found.    Total time spent with patient in face to face time, chart review and documentation was 40 minutes.   First visit with pt.       Signed by: PERICO Maher CNP

## 2025-03-26 NOTE — PROGRESS NOTES
"[facility]: Cancer Care Follow-Up Note                                    Discussion with Patient:                                                      Patient comes into the clinic today for follow-up with Mary James CNP in Dr Sterling's absence.  He is here to receive Zometa infusions as well.     Goals          General    Maintain ability to perform ADLs without difficulty             Dates of Treatment:                                                      Infusion given in last 28 days       Administered MAR Action Medication Dose Rate Visit    02/28/2025 12:21 Given leuprolide (LUPRON DEPOT) kit 22.5 mg 22.5 mg   Infusion Therapy Visit on 02/28/2025 in [facility]          Treatment Plan Medications       OP ONC Prostate Cancer - Abiraterone (Zytiga) / Prednisone / Leuprolide       Take Home Medications       Medication Sig Start/End Day/Cycle Status    predniSONE (DELTASONE) 5 MG tablet Take 1 tablet (5 mg) by mouth 2 times daily. S to S+30 Day 1, Cycle 3 - 4/1/2025 Signed    abiraterone (ZYTIGA) 250 MG tablet Take 4 tablets (1,000 mg) by mouth daily for 30 doses. <span hidden class=\"[email or web address]\"></span>Take on empty stomach. S to S+30 Day 1, Cycle 3 - 4/1/2025 Signed                            Assessment:                                                      Overall patient is doing well.  His labs are looking much better than when he was diagnosed.  Patient received 1 monthly dose of Lupron on 1/31/2025.  He then started his every 3-month Lupron dosing on 2/28/2025.  He will be due again in May for his next Lupron injection.  Patient will continue getting Zometa on a monthly basis for bone health.    Intervention/Education provided during outreach:                                                       Patient is scheduled on April 25 to have labs drawn and receive Zometa.  He will then come back on May 22 for labs, Dr Sterling and will get both Zometa and Lupron.    I also " responded to the patient via [employer] [employer] per Mary James CNP request.  He had asked about supplements.  Message was sent to our oral chemotherapy pharmacy team who reviewed it and states that all supplements were okay except for the garlic capsule that we are uncertain of any interactions with the abiraterone.  It was recommended that he stop taking that garlic supplement while he is on treatment with abiraterone.    Patient to follow up as scheduled at next appt.  Patient to call/[employer] [place] [employer] message with updates.  Confirmed patient has clinic and triage numbers.    Signature:  Nasrin Nuñez RN

## 2025-03-26 NOTE — LETTER
"3/26/2025      Kamille Gleason  [address] [address]      Dear Colleague,    Thank you for referring your patient, Kamille Gleason, to the [facility]. Please see a copy of my visit note below.    Oncology Rooming Note    March 26, 2025 10:18 AM   Kamille Gleason is a 79 year old male who presents for:    Chief Complaint   Patient presents with     Oncology Clinic Visit     Return visit with lab and infusion. Prostate cancer metastatic to bone.     Initial Vitals: BP (!) 149/67 (BP Location: Left arm, Patient Position: Sitting, Cuff Size: Adult Regular)   Pulse 86   Temp 98.8  F (37.1  C) (Oral)   Resp 16   Wt 75.3 kg (165 lb 14.4 oz)   SpO2 97%   BMI 26.58 kg/m   Estimated body mass index is 26.58 kg/m  as calculated from the following:    Height as of 1/30/25: 1.683 m (5' 6.25\").    Weight as of this encounter: 75.3 kg (165 lb 14.4 oz). Body surface area is 1.88 meters squared.  Mild Pain (3) Comment: Data Unavailable   No LMP for male patient.  Allergies reviewed: Yes  Medications reviewed: Yes    Medications: Medication refills not needed today.  Pharmacy name entered into [facility]: [id number] [place], [place] - [address] AT [facility]    Frailty Screening:   Is the patient here for a new oncology consult visit in cancer care? 2. No    PHQ9:  Did this patient require a PHQ9?: No      Clinical concerns: lower back pain 2/10. Left shoulder pain radiating into chest 3/10. Mary James [facility] was notified.      Luda Duong, [facility] Hematology and Oncology Progress Note    Patient: Kamille Gleason  MRN: [id number]  Date of Service: Mar 26, 2025          Reason for Visit    Chief Complaint   Patient presents with     Oncology Clinic Visit     Return visit with lab and infusion. Prostate cancer metastatic to bone.       Assessment and Plan     Cancer Staging   No matching staging information " was found for the patient.    Prostate cancer, metastatic disease with bone, lymph node mets: Patient has now started on Lupron, Zytiga and prednisone.  His PSA has responded quite nicely coming down from 120, down to 29 last month.  It is still pending from today.  Review his labs with him today including his CMP that shows an improvement in his liver function.  His alk phos which is quite elevated at the time of his diagnosis likely due to bone mets is also significantly improving.  No kidney dysfunction.  His electrolytes look good.  Will continue his current regimen.  He will continue the Lupron every 3 months.  He will be due for his next dose on May 22.  He will see Dr. Sterling at that time.    Bone mets: Patient is currently getting Zometa every month.  I did tell him we will likely do that for 6 to 12 months and then we will likely switch to every 3 months.    Scapular pain that radiates to chest: Unclear etiology.  I did tell patient that if this does happen and it is lasting more than a couple minutes he probably should go to urgent care to get his heart checked out.  He does take oxycodone and nitro as needed.  Does have a cardiology appointment coming up so I did tell him to talk about his symptom as well.    ECOG Performance    0 - Independent    Distress Screening (within last 30 days)    No data recorded     Pain  Pain Score: Mild Pain (3)  Pain Loc: Shoulder    Problem List    Patient Active Problem List   Diagnosis     Chest pain     S/P CABG x 3     Coronary artery disease involving native coronary artery of native heart with angina pectoris     Mixed hyperlipidemia     Type 2 diabetes mellitus without complication, without long-term current use of insulin (H)     Diaphoresis     Chest pain, unspecified type     Pathological fracture of cervical vertebra, initial encounter     Prostate cancer metastatic to bone (H)         ______________________________________________________________________________    History of Present Illness    Oncologist: Dr. Sterling    Diagnosis: Prostate cancer.  Patient presented to the ER in January 2025 with acute back pain.  Imaging showed sclerotic lesions in bones.  Patient had a PSA done at that time that was 544.  -1/16/25: Patient had a CT scan in the ER that showed lymphadenopathy in the chest abdomen and pelvis likely metastatic.  Sclerotic osseous mets.  -1/28/2025: Patient had a PSMA PET scan that showed findings suspicious for primary prostate malignancy with metastases involving several lymph node sites above and below the diaphragm, both lungs and numerous scattered sites in the skeleton.  -1/29/2025: Patient had a biopsy of a left pelvic lymph node that was consistent with metastatic adenocarcinoma of the prostate gland.    Treatment:  -1/31/2025: Patient started Lupron.  Patient also started Casodex (daily for 14 days).  Zytiga 1000 mg daily and prednisone 5 mg twice a day.    ~Supportive cares.  Patient started Zometa.  He is getting 4 mg once a month    Interim history:  Patient is here today for a follow-up visit.  He is now been on treatment for about 2 months.  Overall he is really happy with how he is doing.  He is been following along with his labs and he has noticed his PSA is significantly improved.  He states that his pain is probably improved by about 80%.  He denies any new bone or back pain issues.  He states that he does still have a little bit of pain in his back.  He says sometimes he has some twinges of pain in his left scapula and it goes into his heart sometimes.  He usually takes oxycodone and that helps.  He does also have a history of heart issues so he does take nitro from time to time.  He says he does feel some weakness in his hands and arms and legs.  He feels like his appetite is good but he has lost some weight.      Review of Systems    Pertinent items are  noted in HPI.    Past History    Past Medical History:   Diagnosis Date     CAD (coronary artery disease)      Hyperlipidemia        PHYSICAL EXAM  BP (!) 149/67 (BP Location: Left arm, Patient Position: Sitting, Cuff Size: Adult Regular)   Pulse 86   Temp 98.8  F (37.1  C) (Oral)   Resp 16   Wt 75.3 kg (165 lb 14.4 oz)   SpO2 97%   BMI 26.58 kg/m      GENERAL: no acute distress. Cooperative in conversation.  In Clinic with wife and daughter  RESP: Regular respiratory rate. No expiratory wheezes   NEURO: non focal. Alert and oriented x3.   PSYCH: within normal limits. No depression or anxiety.  SKIN: exposed skin is dry intact.     Lab Results    Recent Results (from the past week)   Calcium   Result Value Ref Range    Calcium 8.7 (L) 8.8 - 10.4 mg/dL   Comprehensive metabolic panel   Result Value Ref Range    Sodium 137 135 - 145 mmol/L    Potassium 4.6 3.4 - 5.3 mmol/L    Carbon Dioxide (CO2) 27 22 - 29 mmol/L    Anion Gap 6 (L) 7 - 15 mmol/L    Urea Nitrogen 19.3 8.0 - 23.0 mg/dL    Creatinine 0.83 0.67 - 1.17 mg/dL    GFR Estimate 90 >60 mL/min/1.73m2    Calcium 8.7 (L) 8.8 - 10.4 mg/dL    Chloride 104 98 - 107 mmol/L    Glucose 171 (H) 70 - 99 mg/dL    Alkaline Phosphatase 514 (H) 40 - 150 U/L    AST 35 0 - 45 U/L    ALT 86 (H) 0 - 70 U/L    Protein Total 6.8 6.4 - 8.3 g/dL    Albumin 4.0 3.5 - 5.2 g/dL    Bilirubin Total 0.4 <=1.2 mg/dL     Lab Results   Component Value Date    PSA 29.30 (H) 02/28/2025    .00 (H) 01/30/2025    .00 (H) 01/16/2025    .00 (H) 01/16/2025   ]  Imaging    No results found.    Total time spent with patient in face to face time, chart review and documentation was 40 minutes.  First visit with pt.       Signed by: PERICO Maher CNP      Again, thank you for allowing me to participate in the care of your patient.        Sincerely,        Mary James, PERICO CNP    Electronically signed

## 2025-03-26 NOTE — PROGRESS NOTES
Infusion Nursing Note:  Kamille Gleason presents today for Zometa infusion.    Patient seen by provider today: Yes: Mary James CNP   present during visit today: Not Applicable.    Note: Administered Zometa IV as ordered per provider. Tolerated well without issue.    Intravenous Access:  Labs drawn without difficulty.  Peripheral IV placed.    Treatment Conditions:  Lab Results   Component Value Date     03/26/2025    POTASSIUM 4.6 03/26/2025    MAG 2.1 01/19/2025    CR 0.83 03/26/2025    DONNA 8.7 (L) 03/26/2025    DONAN 8.7 (L) 03/26/2025    BILITOTAL 0.4 03/26/2025    ALBUMIN 4.0 03/26/2025    ALT 86 (H) 03/26/2025    AST 35 03/26/2025       Results reviewed, labs MET treatment parameters, ok to proceed with treatment.    Post Infusion Assessment:  Patient tolerated infusion without incident.  Blood return noted pre and post infusion.  Site patent and intact, free from redness, edema or discomfort.  No evidence of extravasations.  Access discontinued per protocol.     Discharge Plan:   Patient verbalized understanding of discharge instructions and all questions answered.  Patient discharged in stable condition accompanied by: self.  Departure Mode: Ambulatory.      BHUPINDER MCHUGH RN

## 2025-03-27 DIAGNOSIS — C61 PROSTATE CANCER METASTATIC TO BONE (H): Primary | ICD-10-CM

## 2025-03-27 DIAGNOSIS — C79.51 PROSTATE CANCER METASTATIC TO BONE (H): Primary | ICD-10-CM

## 2025-04-02 DIAGNOSIS — M84.48XA PATHOLOGICAL FRACTURE OF CERVICAL VERTEBRA, INITIAL ENCOUNTER: ICD-10-CM

## 2025-04-02 RX ORDER — OXYCODONE HYDROCHLORIDE 5 MG/1
5-10 TABLET ORAL EVERY 4 HOURS PRN
Qty: 60 TABLET | Refills: 0 | Status: SHIPPED | OUTPATIENT
Start: 2025-04-02

## 2025-04-02 NOTE — TELEPHONE ENCOUNTER
Phone call received from [facility]. He is requesting a refill of oxycodone. He takes 2 per day. It was last refilled 3/3/25, #60, 0 refills. Will route to Mary James CNP to review.     Christiane Fernandez RN on 4/2/2025 at 2:08 PM

## 2025-04-30 DIAGNOSIS — M84.48XA PATHOLOGICAL FRACTURE OF CERVICAL VERTEBRA, INITIAL ENCOUNTER: ICD-10-CM

## 2025-04-30 RX ORDER — OXYCODONE HYDROCHLORIDE 5 MG/1
5-10 TABLET ORAL EVERY 4 HOURS PRN
Qty: 60 TABLET | Refills: 0 | Status: SHIPPED | OUTPATIENT
Start: 2025-04-30

## 2025-04-30 NOTE — TELEPHONE ENCOUNTER
Roberto called requesting a refill of oxycodone. This was last filled 4/2/25, #60, 0 refills. Will route this request to Mary James CNP to review and refill.    Christiane Fernandez RN on 4/30/2025 at 1:26 PM

## 2025-05-22 ENCOUNTER — LAB (OUTPATIENT)
Dept: INFUSION THERAPY | Facility: HOSPITAL | Age: 80
End: 2025-05-22
Attending: NURSE PRACTITIONER
Payer: COMMERCIAL

## 2025-05-22 ENCOUNTER — INFUSION THERAPY VISIT (OUTPATIENT)
Dept: INFUSION THERAPY | Facility: HOSPITAL | Age: 80
End: 2025-05-22
Attending: INTERNAL MEDICINE
Payer: COMMERCIAL

## 2025-05-22 ENCOUNTER — ONCOLOGY VISIT (OUTPATIENT)
Dept: ONCOLOGY | Facility: HOSPITAL | Age: 80
End: 2025-05-22
Attending: NURSE PRACTITIONER
Payer: COMMERCIAL

## 2025-05-22 VITALS
RESPIRATION RATE: 16 BRPM | OXYGEN SATURATION: 97 % | WEIGHT: 169.8 LBS | BODY MASS INDEX: 27.29 KG/M2 | HEART RATE: 84 BPM | HEIGHT: 66 IN | DIASTOLIC BLOOD PRESSURE: 74 MMHG | TEMPERATURE: 98.2 F | SYSTOLIC BLOOD PRESSURE: 125 MMHG

## 2025-05-22 DIAGNOSIS — E11.9 TYPE 2 DIABETES MELLITUS WITHOUT COMPLICATION, WITHOUT LONG-TERM CURRENT USE OF INSULIN (H): Primary | ICD-10-CM

## 2025-05-22 DIAGNOSIS — C61 PROSTATE CANCER METASTATIC TO BONE (H): ICD-10-CM

## 2025-05-22 DIAGNOSIS — C79.51 PROSTATE CANCER METASTATIC TO BONE (H): ICD-10-CM

## 2025-05-22 DIAGNOSIS — C79.51 PROSTATE CANCER METASTATIC TO BONE (H): Primary | ICD-10-CM

## 2025-05-22 DIAGNOSIS — C61 PROSTATE CANCER METASTATIC TO BONE (H): Primary | ICD-10-CM

## 2025-05-22 DIAGNOSIS — M84.48XA PATHOLOGICAL FRACTURE OF CERVICAL VERTEBRA, INITIAL ENCOUNTER: Primary | ICD-10-CM

## 2025-05-22 DIAGNOSIS — M84.48XA PATHOLOGICAL FRACTURE OF CERVICAL VERTEBRA, INITIAL ENCOUNTER: ICD-10-CM

## 2025-05-22 LAB
ALBUMIN SERPL BCG-MCNC: 4.1 G/DL (ref 3.5–5.2)
ALP SERPL-CCNC: 192 U/L (ref 40–150)
ALT SERPL W P-5'-P-CCNC: 60 U/L (ref 0–70)
ANION GAP SERPL CALCULATED.3IONS-SCNC: 9 MMOL/L (ref 7–15)
AST SERPL W P-5'-P-CCNC: 30 U/L (ref 0–45)
BASOPHILS # BLD AUTO: 0 10E3/UL (ref 0–0.2)
BASOPHILS NFR BLD AUTO: 0 %
BILIRUB SERPL-MCNC: 0.6 MG/DL
BUN SERPL-MCNC: 16.5 MG/DL (ref 8–23)
CALCIUM SERPL-MCNC: 9.1 MG/DL (ref 8.8–10.4)
CHLORIDE SERPL-SCNC: 103 MMOL/L (ref 98–107)
CREAT SERPL-MCNC: 0.81 MG/DL (ref 0.67–1.17)
EGFRCR SERPLBLD CKD-EPI 2021: 90 ML/MIN/1.73M2
EOSINOPHIL # BLD AUTO: 0.3 10E3/UL (ref 0–0.7)
EOSINOPHIL NFR BLD AUTO: 4 %
ERYTHROCYTE [DISTWIDTH] IN BLOOD BY AUTOMATED COUNT: 13 % (ref 10–15)
GLUCOSE SERPL-MCNC: 136 MG/DL (ref 70–99)
HCO3 SERPL-SCNC: 29 MMOL/L (ref 22–29)
HCT VFR BLD AUTO: 43.5 % (ref 40–53)
HGB BLD-MCNC: 13.9 G/DL (ref 13.3–17.7)
IMM GRANULOCYTES # BLD: 0 10E3/UL
IMM GRANULOCYTES NFR BLD: 0 %
LYMPHOCYTES # BLD AUTO: 2.4 10E3/UL (ref 0.8–5.3)
LYMPHOCYTES NFR BLD AUTO: 35 %
MCH RBC QN AUTO: 29.2 PG (ref 26.5–33)
MCHC RBC AUTO-ENTMCNC: 32 G/DL (ref 31.5–36.5)
MCV RBC AUTO: 91 FL (ref 78–100)
MONOCYTES # BLD AUTO: 0.6 10E3/UL (ref 0–1.3)
MONOCYTES NFR BLD AUTO: 9 %
NEUTROPHILS # BLD AUTO: 3.4 10E3/UL (ref 1.6–8.3)
NEUTROPHILS NFR BLD AUTO: 51 %
NRBC # BLD AUTO: 0 10E3/UL
NRBC BLD AUTO-RTO: 0 /100
PLATELET # BLD AUTO: 224 10E3/UL (ref 150–450)
POTASSIUM SERPL-SCNC: 3.8 MMOL/L (ref 3.4–5.3)
PROT SERPL-MCNC: 7 G/DL (ref 6.4–8.3)
PSA SERPL DL<=0.01 NG/ML-MCNC: 0.64 NG/ML (ref 0–6.5)
RBC # BLD AUTO: 4.76 10E6/UL (ref 4.4–5.9)
SODIUM SERPL-SCNC: 141 MMOL/L (ref 135–145)
WBC # BLD AUTO: 6.7 10E3/UL (ref 4–11)

## 2025-05-22 PROCEDURE — 84155 ASSAY OF PROTEIN SERUM: CPT

## 2025-05-22 PROCEDURE — G0463 HOSPITAL OUTPT CLINIC VISIT: HCPCS | Performed by: INTERNAL MEDICINE

## 2025-05-22 PROCEDURE — 250N000011 HC RX IP 250 OP 636: Mod: JZ | Performed by: INTERNAL MEDICINE

## 2025-05-22 PROCEDURE — 85025 COMPLETE CBC W/AUTO DIFF WBC: CPT | Performed by: NURSE PRACTITIONER

## 2025-05-22 PROCEDURE — 84153 ASSAY OF PSA TOTAL: CPT | Performed by: NURSE PRACTITIONER

## 2025-05-22 RX ORDER — ABIRATERONE ACETATE 250 MG/1
1000 TABLET ORAL DAILY
Qty: 120 TABLET | Refills: 0 | OUTPATIENT
Start: 2025-08-14 | End: 2025-09-13

## 2025-05-22 RX ORDER — PREDNISONE 5 MG/1
5 TABLET ORAL 2 TIMES DAILY
Qty: 60 TABLET | Refills: 0 | Status: SHIPPED | OUTPATIENT
Start: 2025-08-14 | End: 2025-09-13

## 2025-05-22 RX ORDER — ZOLEDRONIC ACID 0.04 MG/ML
4 INJECTION, SOLUTION INTRAVENOUS ONCE
Status: COMPLETED | OUTPATIENT
Start: 2025-05-22 | End: 2025-05-22

## 2025-05-22 RX ADMIN — ZOLEDRONIC ACID 4 MG: 0.04 INJECTION, SOLUTION INTRAVENOUS at 10:48

## 2025-05-22 ASSESSMENT — PAIN SCALES - GENERAL: PAINLEVEL_OUTOF10: MILD PAIN (3)

## 2025-05-22 NOTE — PROGRESS NOTES
Infusion Nursing Note:  Kamille Gleason presents today for C5D1, Zometa infusion.     Patient seen by provider today: Yes: yes, Dr. Sterling   present during visit today: Not Applicable.    Note: injection in right buttock      Intravenous Access:  Peripheral IV placed.    Treatment Conditions:  Lab Results   Component Value Date    HGB 13.9 05/22/2025    WBC 6.7 05/22/2025    ANEU 3.4 05/22/2025     05/22/2025        Lab Results   Component Value Date     05/22/2025    POTASSIUM 3.8 05/22/2025    MAG 2.1 01/19/2025    CR 0.81 05/22/2025    DONNA 9.1 05/22/2025    BILITOTAL 0.6 05/22/2025    ALBUMIN 4.1 05/22/2025    ALT 60 05/22/2025    AST 30 05/22/2025       Results reviewed, labs MET treatment parameters, ok to proceed with treatment.      Post Infusion Assessment:  Patient tolerated infusion without incident.  Patient tolerated injection without incident.  Blood return noted pre and post infusion.  No evidence of extravasations.  Access discontinued per protocol.       Discharge Plan:   Discharge instructions reviewed with: Patient.  Patient and/or family verbalized understanding of discharge instructions and all questions answered.  Patient discharged in stable condition accompanied by: self.  Departure Mode: Ambulatory.      Estee Monreal RN

## 2025-05-22 NOTE — PROGRESS NOTES
[facility] Hematology and Oncology Progress Note    Patient: Kamille Gleason  MRN: [id number]  Date of Service: May 22, 2025           Reason for visit         Problem List Items Addressed This Visit          Musculoskeletal and Integumentary    Pathological fracture of cervical vertebra, initial encounter - Primary    Prostate cancer metastatic to bone (H)         Assessment AND Problems Addressed      A very pleasant 79 year old gentleman with metastatic prostate cancer.  PSMA PET scan is showing widespread metastatic disease involving multiple bones, pelvic, retroperitoneal and mediastinal lymph nodes.  Biopsy of the pelvic lymph node did confirm presence of metastatic prostate cancer.  Nondisplaced fracture of the C7 vertebral body lamina and facets.  He has been started on Zometa.  Diffuse sclerotic lesions in multiple bones.  Iliac lymphadenopathy left more than the right.  Other medical conditions stable.  Having issues with constipation secondary to pain medication.  Diabetes, that is not treated.  Hot flashes.      Plan      Continue treatment with Lupron and abiraterone.  Take prednisone at least 5 mg daily if not twice a day.  Zometa for bone disease.  Diet rich in calcium and vitamin D.  Will monitor his PSA and testosterone level.  Treat his diabetes.  The longitudinal plan of care for the diagnosis(es)/condition(s) as documented were addressed during this visit. Due to the added complexity in care, I will continue to support JANIS in the subsequent management and with ongoing continuity of care.     Medical decision making      Extremely complex.  Patient presenting with advanced malignancy with significant pain.   Reviewed the biopsies CT showing adequate biopsy of the lymph node.  Reviewed his labs including CBC CMP and PSA.  Ordered treatment with Lupron and albuterol.  Labs ordered.  Follow-up ordered.    Risk      Risk of morbidity mortality.  Significant risk of side effects from  "intervention.       Cancer Staging   No matching staging information was found for the patient.      History of present illness      Mr. Kamille Gleason is a very pleasant 79 year old gentleman  with metastatic prostate cancer.  He presented on 16 January 2025 to the emergency room with some acute on chronic back pain.  X-ray showed some sclerotic lesion which led to a CT scan of the neck chest abdomen and pelvis.  CT scan showed bilateral nondisplaced C7 facet and lamina fractures and irregularly sclerotic C7 vertebral body.  Thoracic spine showed subtle sclerotic foci in multiple vertebral bodies as did the lumbar spine.  The abdominal CT also showed bilateral iliac left more than the right lymphadenopathy.  He was admitted in the hospital.  He had a PSA done which was over 500.    The patient was admitted was seen by neurosurgery and other consultants.  Discharged on some pain medication.    He underwent biopsy of the pelvic lymph node on 29 January 2025.  That biopsy did confirm the presence of metastatic prostate cancer.  He was started on treatment with Firmagon and abiraterone.  After initial treatment his treatment switched to Lupron with abiraterone.  His PSA is responding nicely. Having fair bit of side effects including ADT related side effects. Having increased frequency of urination at night.      Review of system      Details noted in the history of present illness.  A detailed review of systems is otherwise negative.      Physical exam        /74   Pulse 84   Temp 98.2  F (36.8  C)   Resp 16   Ht 1.683 m (5' 6.25\")   Wt 77 kg (169 lb 12.8 oz)   SpO2 97%   BMI 27.20 kg/m      GENERAL: No acute distress. Cooperative in conversation.   HEENT:  Pupils are equal, round and reactive. Oral mucosa is clean and intact. No ulcerations or mucositis noted. No bleeding noted.  RESP:Chest symmetric lungs are clear bilaterally per auscultation. Regular respiratory rate. No wheezes or rhonchi.  CV: " Normal S1 S2 Regular, rate and rhythm.     ABD: Nondistended, soft, nontender. Positive bowel sounds. No organomegaly.   EXTREMITIES: No lower extremity edema.   NEURO: Non- focal. Alert and oriented x3.  Cranial nerves appear intact.  PSYCH: Within normal limits. No depression or anxiety.  SKIN: Warm dry intact.      Lab results Reviewed      Recent Results (from the past week)   CBC with platelets and differential   Result Value Ref Range    WBC Count 6.7 4.0 - 11.0 10e3/uL    RBC Count 4.76 4.40 - 5.90 10e6/uL    Hemoglobin 13.9 13.3 - 17.7 g/dL    Hematocrit 43.5 40.0 - 53.0 %    MCV 91 78 - 100 fL    MCH 29.2 26.5 - 33.0 pg    MCHC 32.0 31.5 - 36.5 g/dL    RDW 13.0 10.0 - 15.0 %    Platelet Count 224 150 - 450 10e3/uL    % Neutrophils 51 %    % Lymphocytes 35 %    % Monocytes 9 %    % Eosinophils 4 %    % Basophils 0 %    % Immature Granulocytes 0 %    NRBCs per 100 WBC 0 <1 /100    Absolute Neutrophils 3.4 1.6 - 8.3 10e3/uL    Absolute Lymphocytes 2.4 0.8 - 5.3 10e3/uL    Absolute Monocytes 0.6 0.0 - 1.3 10e3/uL    Absolute Eosinophils 0.3 0.0 - 0.7 10e3/uL    Absolute Basophils 0.0 0.0 - 0.2 10e3/uL    Absolute Immature Granulocytes 0.0 <=0.4 10e3/uL    Absolute NRBCs 0.0 10e3/uL       Imaging results Reviewed        No results found.      Signed by: Timothy Sterling MD      This note has been dictated using voice recognition software. Any grammatical or context distortions are unintentional and inherent to the software

## 2025-05-22 NOTE — PROGRESS NOTES
"Oncology Rooming Note    May 22, 2025 9:42 AM   Kamille Gleason is a 79 year old male who presents for:    Chief Complaint   Patient presents with    Oncology Clinic Visit     Prostate cancer metastatic to bone (H)     Initial Vitals: /74   Pulse 84   Temp 98.2  F (36.8  C)   Resp 16   Ht 1.683 m (5' 6.25\")   Wt 77 kg (169 lb 12.8 oz)   SpO2 97%   BMI 27.20 kg/m   Estimated body mass index is 27.2 kg/m  as calculated from the following:    Height as of this encounter: 1.683 m (5' 6.25\").    Weight as of this encounter: 77 kg (169 lb 12.8 oz). Body surface area is 1.9 meters squared.  Mild Pain (3) Comment: Data Unavailable   No LMP for male patient.  Allergies reviewed: Yes  Medications reviewed: Yes    Medications: Medication refills not needed today.  Pharmacy name entered into PPT[facility]: [id number] [address] AT [facility]    Frailty Screening:   Is the patient here for a new oncology consult visit in cancer care? 2. No    PHQ9:  Did this patient require a PHQ9?: No      Clinical concerns: Has questions written down for provider      Mariela Maurer LPN             "

## 2025-05-22 NOTE — LETTER
"5/22/2025      Kamille Gleason  [address] [address]      Dear Colleague,    Thank you for referring your patient, Kamille Gleason, to the [facility] CANCER [facility]. Please see a copy of my visit note below.    Oncology Rooming Note    May 22, 2025 9:42 AM   Kamille Gleason is a 79 year old male who presents for:    Chief Complaint   Patient presents with     Oncology Clinic Visit     Prostate cancer metastatic to bone (H)     Initial Vitals: /74   Pulse 84   Temp 98.2  F (36.8  C)   Resp 16   Ht 1.683 m (5' 6.25\")   Wt 77 kg (169 lb 12.8 oz)   SpO2 97%   BMI 27.20 kg/m   Estimated body mass index is 27.2 kg/m  as calculated from the following:    Height as of this encounter: 1.683 m (5' 6.25\").    Weight as of this encounter: 77 kg (169 lb 12.8 oz). Body surface area is 1.9 meters squared.  Mild Pain (3) Comment: Data Unavailable   No LMP for male patient.  Allergies reviewed: Yes  Medications reviewed: Yes    Medications: Medication refills not needed today.  Pharmacy name entered into [place]: [id number] [address] AT [facility] OF [facility]    Frailty Screening:   Is the patient here for a new oncology consult visit in cancer care? 2. No    PHQ9:  Did this patient require a PHQ9?: No      Clinical concerns: Has questions written down for provider      Mariela Maurer LPN               [facility] Hematology and Oncology Progress Note    Patient: Kamille Gleason  MRN: [id number]  Date of Service: May 22, 2025           Reason for visit         Problem List Items Addressed This Visit          Musculoskeletal and Integumentary    Pathological fracture of cervical vertebra, initial encounter - Primary    Prostate cancer metastatic to bone (H)         Assessment AND Problems Addressed      A very pleasant 79 year old gentleman with metastatic prostate cancer.  PSMA PET scan is showing widespread metastatic disease " Problem: RESPIRATORY - ADULT  Goal: Achieves optimal ventilation and oxygenation  Description: INTERVENTIONS:  - Assess for changes in respiratory status  - Assess for changes in mentation and behavior  - Position to facilitate oxygenation and minimize respiratory effort  - Oxygen supplementation based on oxygen saturation or ABGs  - Provide Smoking Cessation handout, if applicable  - Encourage broncho-pulmonary hygiene including cough, deep breathe, Incentive Spirometry  - Assess the need for suctioning and perform as needed  - Assess and instruct to report SOB or any respiratory difficulty  - Respiratory Therapy support as indicated  - Manage/alleviate anxiety  - Monitor for signs/symptoms of CO2 retention  Outcome: Progressing     Pt with chronic tracheostomy Shiley 8.0 on full support with the following vent settings: AC/VC 16/600/+5/45%. Pt tolerating well, saturating appropriately, FiO2 wean down to 40%. Suction provided as needed, RT will continue to monitor. involving multiple bones, pelvic, retroperitoneal and mediastinal lymph nodes.  Biopsy of the pelvic lymph node did confirm presence of metastatic prostate cancer.  Nondisplaced fracture of the C7 vertebral body lamina and facets.  He has been started on Zometa.  Diffuse sclerotic lesions in multiple bones.  Iliac lymphadenopathy left more than the right.  Other medical conditions stable.  Having issues with constipation secondary to pain medication.  Diabetes, that is not treated.  Hot flashes.      Plan      Continue treatment with Lupron and abiraterone.  Take prednisone at least 5 mg daily if not twice a day.  Zometa for bone disease.  Diet rich in calcium and vitamin D.  Will monitor his PSA and testosterone level.  Treat his diabetes.  The longitudinal plan of care for the diagnosis(es)/condition(s) as documented were addressed during this visit. Due to the added complexity in care, I will continue to support JANIS in the subsequent management and with ongoing continuity of care.     Medical decision making      Extremely complex.  Patient presenting with advanced malignancy with significant pain.   Reviewed the biopsies CT showing adequate biopsy of the lymph node.  Reviewed his labs including CBC CMP and PSA.  Ordered treatment with Lupron and albuterol.  Labs ordered.  Follow-up ordered.    Risk      Risk of morbidity mortality.  Significant risk of side effects from intervention.       Cancer Staging   No matching staging information was found for the patient.      History of present illness      Mr. Kamille Gleason is a very pleasant 79 year old gentleman  with metastatic prostate cancer.  He presented on 16 January 2025 to the emergency room with some acute on chronic back pain.  X-ray showed some sclerotic lesion which led to a CT scan of the neck chest abdomen and pelvis.  CT scan showed bilateral nondisplaced C7 facet and lamina fractures and irregularly sclerotic C7 vertebral body.  Thoracic spine  "showed subtle sclerotic foci in multiple vertebral bodies as did the lumbar spine.  The abdominal CT also showed bilateral iliac left more than the right lymphadenopathy.  He was admitted in the hospital.  He had a PSA done which was over 500.    The patient was admitted was seen by neurosurgery and other consultants.  Discharged on some pain medication.    He underwent biopsy of the pelvic lymph node on 29 January 2025.  That biopsy did confirm the presence of metastatic prostate cancer.  He was started on treatment with Firmagon and abiraterone.  After initial treatment his treatment switched to Lupron with abiraterone.  His PSA is responding nicely. Having fair bit of side effects including ADT related side effects. Having increased frequency of urination at night.      Review of system      Details noted in the history of present illness.  A detailed review of systems is otherwise negative.      Physical exam        /74   Pulse 84   Temp 98.2  F (36.8  C)   Resp 16   Ht 1.683 m (5' 6.25\")   Wt 77 kg (169 lb 12.8 oz)   SpO2 97%   BMI 27.20 kg/m      GENERAL: No acute distress. Cooperative in conversation.   HEENT:  Pupils are equal, round and reactive. Oral mucosa is clean and intact. No ulcerations or mucositis noted. No bleeding noted.  RESP:Chest symmetric lungs are clear bilaterally per auscultation. Regular respiratory rate. No wheezes or rhonchi.  CV: Normal S1 S2 Regular, rate and rhythm.     ABD: Nondistended, soft, nontender. Positive bowel sounds. No organomegaly.   EXTREMITIES: No lower extremity edema.   NEURO: Non- focal. Alert and oriented x3.  Cranial nerves appear intact.  PSYCH: Within normal limits. No depression or anxiety.  SKIN: Warm dry intact.      Lab results Reviewed      Recent Results (from the past week)   CBC with platelets and differential   Result Value Ref Range    WBC Count 6.7 4.0 - 11.0 10e3/uL    RBC Count 4.76 4.40 - 5.90 10e6/uL    Hemoglobin 13.9 13.3 - 17.7 " g/dL    Hematocrit 43.5 40.0 - 53.0 %    MCV 91 78 - 100 fL    MCH 29.2 26.5 - 33.0 pg    MCHC 32.0 31.5 - 36.5 g/dL    RDW 13.0 10.0 - 15.0 %    Platelet Count 224 150 - 450 10e3/uL    % Neutrophils 51 %    % Lymphocytes 35 %    % Monocytes 9 %    % Eosinophils 4 %    % Basophils 0 %    % Immature Granulocytes 0 %    NRBCs per 100 WBC 0 <1 /100    Absolute Neutrophils 3.4 1.6 - 8.3 10e3/uL    Absolute Lymphocytes 2.4 0.8 - 5.3 10e3/uL    Absolute Monocytes 0.6 0.0 - 1.3 10e3/uL    Absolute Eosinophils 0.3 0.0 - 0.7 10e3/uL    Absolute Basophils 0.0 0.0 - 0.2 10e3/uL    Absolute Immature Granulocytes 0.0 <=0.4 10e3/uL    Absolute NRBCs 0.0 10e3/uL       Imaging results Reviewed        No results found.      Signed by: Timothy Sterling MD      This note has been dictated using voice recognition software. Any grammatical or context distortions are unintentional and inherent to the software      Again, thank you for allowing me to participate in the care of your patient.        Sincerely,        Timothy Sterling MD    Electronically signed

## 2025-05-29 DIAGNOSIS — M84.48XA PATHOLOGICAL FRACTURE OF CERVICAL VERTEBRA, INITIAL ENCOUNTER: ICD-10-CM

## 2025-05-29 RX ORDER — OXYCODONE HYDROCHLORIDE 5 MG/1
5-10 TABLET ORAL EVERY 4 HOURS PRN
Qty: 60 TABLET | Refills: 0 | Status: SHIPPED | OUTPATIENT
Start: 2025-05-29

## 2025-05-29 NOTE — TELEPHONE ENCOUNTER
I received a phone call today from [facility].  He is calling to request a refill of oxycodone. Last refilled 4/30/25, #60, 0 refills. Preferred pharmacy: Jayden Fernandez RN on 5/29/2025 at 9:41 AM

## 2025-06-04 ENCOUNTER — TELEPHONE (OUTPATIENT)
Dept: ONCOLOGY | Facility: HOSPITAL | Age: 80
End: 2025-06-04
Payer: COMMERCIAL

## 2025-06-04 NOTE — ORAL ONC MGMT
"Oral Chemotherapy Monitoring Program   Left Voicemail    Attempted to contact patient today for follow up regarding oral chemotherapy, abiraterone, for monthly assessment. No answer. Left voicemail for patient to call us back at [phone] when able. No medication name was left.    Amparo Burgess, PharmD, BCOP  Oral Chemotherapy Pharmacist  [facility] Phone: [phone]  In Basket Pools: \"[facility] ORAL CHEMOTHERAPY PHARMACIST\" or \"[facility] ORAL CHEMOTHERAPY PHARMACIST\"  June 4, 2025       "

## 2025-06-19 ENCOUNTER — TELEPHONE (OUTPATIENT)
Dept: ONCOLOGY | Facility: HOSPITAL | Age: 80
End: 2025-06-19
Payer: COMMERCIAL

## 2025-06-19 DIAGNOSIS — C61 PROSTATE CANCER METASTATIC TO BONE (H): Primary | ICD-10-CM

## 2025-06-19 DIAGNOSIS — C79.51 PROSTATE CANCER METASTATIC TO BONE (H): Primary | ICD-10-CM

## 2025-06-19 RX ORDER — ABIRATERONE ACETATE 250 MG/1
1000 TABLET ORAL DAILY
Qty: 120 TABLET | Refills: 0 | Status: SHIPPED | OUTPATIENT
Start: 2025-06-19 | End: 2025-07-19

## 2025-06-19 RX ORDER — PREDNISONE 5 MG/1
5 TABLET ORAL 2 TIMES DAILY
Qty: 60 TABLET | Refills: 0 | Status: SHIPPED | OUTPATIENT
Start: 2025-06-19 | End: 2025-07-19

## 2025-06-19 NOTE — ORAL ONC MGMT
Oral Chemotherapy Monitoring Program   Left Voicemail    Attempted to contact patient today for follow up regarding oral chemotherapy, abiraterone, for assessment. No answer. Left voicemail for patient to call us back at [phone] when able. No medication name was left.    Mariela Sanders, Zackary  Oral Chemotherapy Pharmacist  [facility] Phone: [phone]  In Basket Pools:   [facility] ORAL CHEMOTHERAPY PHARMACIST   [facility] ORAL CHEMOTHERAPY PHARMACIST

## 2025-06-25 ENCOUNTER — TELEPHONE (OUTPATIENT)
Dept: ONCOLOGY | Facility: HOSPITAL | Age: 80
End: 2025-06-25
Payer: COMMERCIAL

## 2025-06-25 DIAGNOSIS — C61 PROSTATE CANCER METASTATIC TO BONE (H): Primary | ICD-10-CM

## 2025-06-25 DIAGNOSIS — M84.48XA PATHOLOGICAL FRACTURE OF CERVICAL VERTEBRA, INITIAL ENCOUNTER: ICD-10-CM

## 2025-06-25 DIAGNOSIS — R22.31 LOCALIZED SWELLING, MASS AND LUMP, RIGHT UPPER LIMB: ICD-10-CM

## 2025-06-25 DIAGNOSIS — C79.51 PROSTATE CANCER METASTATIC TO BONE (H): Primary | ICD-10-CM

## 2025-06-25 RX ORDER — OXYCODONE HYDROCHLORIDE 5 MG/1
5-10 TABLET ORAL EVERY 4 HOURS PRN
Qty: 60 TABLET | Refills: 0 | Status: SHIPPED | OUTPATIENT
Start: 2025-06-25

## 2025-06-25 NOTE — TELEPHONE ENCOUNTER
Roberto is calling today with some new right shoulder pain.  See telephone encounter for details.  He is requesting a refill of his oxycodone.  He was taking 1-2 tablets/day but states he has been taking a little more as needed given this new right shoulder pain.  This was last refilled 5/29/2025, #60, no refills.  Preferred pharmacy is [facility].  Will route this request to Jada Nguyen PA-C to review and refill if appropriate.    Christiane Fernandez RN on 6/25/2025 at 10:24 AM

## 2025-06-26 ENCOUNTER — HOSPITAL ENCOUNTER (OUTPATIENT)
Dept: ULTRASOUND IMAGING | Facility: HOSPITAL | Age: 80
End: 2025-06-26
Attending: INTERNAL MEDICINE
Payer: COMMERCIAL

## 2025-06-26 DIAGNOSIS — C61 PROSTATE CANCER METASTATIC TO BONE (H): ICD-10-CM

## 2025-06-26 DIAGNOSIS — R22.31 LOCALIZED SWELLING, MASS AND LUMP, RIGHT UPPER LIMB: ICD-10-CM

## 2025-06-26 DIAGNOSIS — C79.51 PROSTATE CANCER METASTATIC TO BONE (H): ICD-10-CM

## 2025-06-26 PROCEDURE — 93971 EXTREMITY STUDY: CPT | Mod: RT

## 2025-06-28 ENCOUNTER — HEALTH MAINTENANCE LETTER (OUTPATIENT)
Age: 80
End: 2025-06-28

## 2025-07-07 ENCOUNTER — TELEPHONE (OUTPATIENT)
Dept: ONCOLOGY | Facility: HOSPITAL | Age: 80
End: 2025-07-07
Payer: COMMERCIAL

## 2025-07-07 NOTE — TELEPHONE ENCOUNTER
I received a phone call today from Roberto.  He is calling to report that the pain he is feeling in his right arm is not any better.  He called on 6/25/2025 to report pain from his shoulder down to his elbow.  He had an ultrasound that ruled out a DVT.  He states that since that time the pain has not gotten any better.  He states the pain seems to move from his neck to his shoulder down through his elbow, all on the right side.  The oxycodone does help but about 4 hours after taking an oxycodone the pain returns.  He has tried ice packs, position changes and heat and nothing seems to help except for the oxycodone.  He is not sleeping well at night as result of the pain.  He feels like its a nerve issue since the pain seems to move down his extremity.  He states that at time the pain can be an 8-9/10.  He has been dealing with this pain for about 1 month.  He is wondering what the next steps will be to further investigate this pain.  I let him know that I would send a message to Mary James CNP to see what she would recommend as next steps and call him back with a plan.    Christiane Fernandez RN on 7/7/2025 at 1:11 PM

## 2025-07-07 NOTE — TELEPHONE ENCOUNTER
Per Dr. Sterling - have patient come tomorrow at 1pm for an assessment. I called Roberto back and this works for him. He will be in the cancer center 7/8 at 1245.     Christiane Fernandez RN on 7/7/2025 at 4:39 PM

## 2025-07-08 ENCOUNTER — ONCOLOGY VISIT (OUTPATIENT)
Dept: ONCOLOGY | Facility: HOSPITAL | Age: 80
End: 2025-07-08
Payer: COMMERCIAL

## 2025-07-08 VITALS
RESPIRATION RATE: 16 BRPM | TEMPERATURE: 98.1 F | BODY MASS INDEX: 27.47 KG/M2 | OXYGEN SATURATION: 95 % | DIASTOLIC BLOOD PRESSURE: 63 MMHG | SYSTOLIC BLOOD PRESSURE: 135 MMHG | HEART RATE: 79 BPM | WEIGHT: 171.5 LBS

## 2025-07-08 DIAGNOSIS — S12.601D CLOSED NONDISPLACED FRACTURE OF SEVENTH CERVICAL VERTEBRA WITH ROUTINE HEALING, UNSPECIFIED FRACTURE MORPHOLOGY, SUBSEQUENT ENCOUNTER: Primary | ICD-10-CM

## 2025-07-08 DIAGNOSIS — C61 PROSTATE CANCER METASTATIC TO BONE (H): ICD-10-CM

## 2025-07-08 DIAGNOSIS — C79.51 PROSTATE CANCER METASTATIC TO BONE (H): ICD-10-CM

## 2025-07-08 PROCEDURE — G0463 HOSPITAL OUTPT CLINIC VISIT: HCPCS

## 2025-07-08 PROCEDURE — 99214 OFFICE O/P EST MOD 30 MIN: CPT

## 2025-07-08 PROCEDURE — G2211 COMPLEX E/M VISIT ADD ON: HCPCS

## 2025-07-08 RX ORDER — TADALAFIL 10 MG/1
10 TABLET ORAL EVERY 24 HOURS
COMMUNITY
Start: 2025-06-11

## 2025-07-08 ASSESSMENT — PAIN SCALES - GENERAL: PAINLEVEL_OUTOF10: MODERATE PAIN (4)

## 2025-07-08 NOTE — PROGRESS NOTES
"Oncology Rooming Note    July 8, 2025 1:05 PM   Kamille Gleason is a 79 year old male who presents for:    Chief Complaint   Patient presents with    Oncology Clinic Visit     Return visit with new symptoms. Prostate cancer metastatic to bone.     Initial Vitals: /63 (BP Location: Left arm, Patient Position: Sitting, Cuff Size: Adult Regular)   Pulse 79   Temp 98.1  F (36.7  C) (Oral)   Resp 16   Wt 77.8 kg (171 lb 8 oz)   SpO2 95%   BMI 27.47 kg/m   Estimated body mass index is 27.47 kg/m  as calculated from the following:    Height as of 5/22/25: 1.683 m (5' 6.25\").    Weight as of this encounter: 77.8 kg (171 lb 8 oz). Body surface area is 1.91 meters squared.  Moderate Pain (4) Comment: Data Unavailable   No LMP for male patient.  Allergies reviewed: Yes  Medications reviewed: Yes    Medications: Medication refills not needed today.  Pharmacy name entered into agencyQ: [id number] [address] AT [facility]    Frailty Screening:   Is the patient here for a new oncology consult visit in cancer care? 2. No    PHQ9:  Did this patient require a PHQ9?: No      Clinical concerns: pain starting in neck going down right arm x 1 month 4/10. Swelling in last 3 fingers.  Jada SEVERINO was notified.      Luda Duong CMA              "

## 2025-07-08 NOTE — PROGRESS NOTES
[facility] Hematology and Oncology Progress Note    Patient: Kamille Gleason  MRN: [id number]  Date of Service: Jul 8, 2025        Reason for Visit    Problem List Items Addressed This Visit          Oncology    Prostate cancer metastatic to bone (H)    Relevant Orders    MRI Cervical spine w & w/o contrast     Other Visit Diagnoses         Closed nondisplaced fracture of seventh cervical vertebra with routine healing, unspecified fracture morphology, subsequent encounter    -  Primary    Relevant Orders    MRI Cervical spine w & w/o contrast            Assessment and Plan    Patient presenting with acute pain in his right side of his neck shoulder and right arm.  Has prostate cancer on treatment.  Personally reviewed his labs which showed improvement of his PSA and alk phos.  Personally reviewed the images of the C spine MRI done back in January 2025.  Slightly motion degraded.  Does show small compression fracture of the C7.  Ordered MRI of the C-spine.  Treatment plan discussed with Jada Nguyen PA-C.    Metastatic prostate cancer to bone and pelvic, retroperitoneal and mediastinal lymph nodes  Nondisplaced fracture of C7 vertebral body lamina and facets  He has been on Lupron, abiraterone, and prednisone since 1/31/25.  He is responding quite well to the treatment.  His PSA has come down to less than  At the time of his diagnosis he was in cervical collar. Couple of months ago he got out of the collar.  His pain got worse.  Shoots down on his right arm.  He has some swelling of his last 3 digits of his right hand as well.  Ultrasound was negative for any DVT.  For his pain he is taking oxycodone 5 mg, two daily. Tramadol 50 mg two daily.     Recommend that we need to get an MRI of his C-spine.  Advised to stay on c-collar for now.  Follow-up after the MRI  Pain control with oxycodone and tramadol.  Minimize any movement/position which initiates pain.    The longitudinal plan of care for the  diagnosis(es)/condition(s) as documented were addressed during this visit. Due to the added complexity in care, I will continue to support JANIS in the subsequent management and with ongoing continuity of care.     ECOG Performance    1 - Can't do physically strenuous work, but fully ambyulatory and can do light sedentary work    Distress Screening (within last 30 days)    No data recorded     Pain  Pain Score: Moderate Pain (4)  Pain Loc: Arm (right)    Problem List    Patient Active Problem List   Diagnosis    Chest pain    S/P CABG x 3    Coronary artery disease involving native coronary artery of native heart with angina pectoris    Mixed hyperlipidemia    Type 2 diabetes mellitus without complication, without long-term current use of insulin (H)    Diaphoresis    Chest pain, unspecified type    Pathological fracture of cervical vertebra, initial encounter    Prostate cancer metastatic to bone (H)        ______________________________________________________________________________    Oncology History    Diagnosis: Prostate cancer.  Patient presented to the ER in January 2025 with acute back pain.  Imaging showed sclerotic lesions in bones.  Patient had a PSA done at that time that was 544.  -1/16/25: Patient had a CT scan in the ER that showed lymphadenopathy in the chest abdomen and pelvis likely metastatic.  Sclerotic osseous mets.  -1/28/2025: Patient had a PSMA PET scan that showed findings suspicious for primary prostate malignancy with metastases involving several lymph node sites above and below the diaphragm, both lungs and numerous scattered sites in the skeleton.  -1/29/2025: Patient had a biopsy of a left pelvic lymph node that was consistent with metastatic adenocarcinoma of the prostate gland.     Treatment:  -1/31/2025: Patient started Lupron.  Patient also started Casodex (daily for 14 days).  Zytiga 1000 mg daily and prednisone 5 mg twice a day.     ~Supportive cares.  Patient started Zometa.  He  is getting 4 mg once a month    Interval History    Roberto is here for follow up.     He reported new pain from his shoulder down to his elbow 6/25/25. US ruled out DVT. Pian is persistent. Oxycodone helps some. Pain can be 8-9/10.       Pertinent items are noted in HPI.    Past History    Past Medical History:   Diagnosis Date    CAD (coronary artery disease)     Hyperlipidemia        Physical Exam        7/8/2025     1:02 PM   Vital Signs   Systolic 135   Diastolic 63   Pulse 79   Temperature 98.1  F (36.7  C)   Respirations 16   Weight (LB) 171 lb 8 oz   Pain Score 4 (Moderate)   O2 95 %       Review of system      Details noted in the history of present illness.  A detailed review of systems is otherwise negative.      Physical exam        /63 (BP Location: Left arm, Patient Position: Sitting, Cuff Size: Adult Regular)   Pulse 79   Temp 98.1  F (36.7  C) (Oral)   Resp 16   Wt 77.8 kg (171 lb 8 oz)   SpO2 95%   BMI 27.47 kg/m      GENERAL: No acute distress. Cooperative in conversation.   HEENT:  Pupils are equal, round and reactive. Oral mucosa is clean and intact. No ulcerations or mucositis noted. No bleeding noted.  RESP:Chest symmetric lungs are clear bilaterally per auscultation. Regular respiratory rate. No wheezes or rhonchi.  CV: Normal S1 S2 Regular, rate and rhythm.     ABD: Nondistended, soft, nontender. Positive bowel sounds. No organomegaly.   EXTREMITIES: No lower extremity edema.   NEURO: Non- focal. Alert and oriented x3.  Cranial nerves appear intact.  PSYCH: Within normal limits. No depression or anxiety.  SKIN: Warm dry intact.      Lab results Reviewed      No results found for this or any previous visit (from the past week).    Imaging results Reviewed        US Upper Extremity Venous Duplex Right  Result Date: 6/26/2025  EXAM: US UPPER EXTREMITY VENOUS DUPLEX RIGHT LOCATION: [facility] DATE: 6/26/2025 INDICATION: Right upper extremity edema and pain. Known  metastatic cancer. Evaluate for DVT. COMPARISON: None. TECHNIQUE: Venous Duplex ultrasound of the right upper extremity with (when possible) and without compression, augmentation, and duplex. Color flow and spectral Doppler with waveform analysis performed. FINDINGS: Ultrasound includes evaluation of the internal jugular vein, innominate vein, subclavian vein, axillary vein, and brachial vein. The superficial cephalic and basilic veins were also evaluated where seen. RIGHT: No deep venous thrombosis. No superficial thrombophlebitis.     IMPRESSION: 1.  No deep venous thrombosis in the right upper extremity.        Signed by: Timothy Sterling MD      This note has been dictated using voice recognition software. Any grammatical or context distortions are unintentional and inherent to the software

## 2025-07-08 NOTE — LETTER
7/8/2025      Kamille Gleason  [address] [address]      Dear Colleague,    Thank you for referring your patient, Kamille Gleason, to the [facility] [place]. Please see a copy of my visit note below.    [facility] Hematology and Oncology Progress Note    Patient: Kamille Gleason  MRN: [id number]  Date of Service: Jul 8, 2025        Reason for Visit    Problem List Items Addressed This Visit          Oncology    Prostate cancer metastatic to bone (H)    Relevant Orders    MRI Cervical spine w & w/o contrast     Other Visit Diagnoses         Closed nondisplaced fracture of seventh cervical vertebra with routine healing, unspecified fracture morphology, subsequent encounter    -  Primary    Relevant Orders    MRI Cervical spine w & w/o contrast            Assessment and Plan    Patient presenting with acute pain in his right side of his neck shoulder and right arm.  Has prostate cancer on treatment.  Personally reviewed his labs which showed improvement of his PSA and alk phos.  Personally reviewed the images of the C spine MRI done back in January 2025.  Slightly motion degraded.  Does show small compression fracture of the C7.  Ordered MRI of the C-spine.  Treatment plan discussed with Jada Nguyen PA-C.    Metastatic prostate cancer to bone and pelvic, retroperitoneal and mediastinal lymph nodes  Nondisplaced fracture of C7 vertebral body lamina and facets  He has been on Lupron, abiraterone, and prednisone since 1/31/25.  He is responding quite well to the treatment.  His PSA has come down to less than  At the time of his diagnosis he was in cervical collar. Couple of months ago he got out of the collar.  His pain got worse.  Shoots down on his right arm.  He has some swelling of his last 3 digits of his right hand as well.  Ultrasound was negative for any DVT.  For his pain he is taking oxycodone 5 mg, two daily. Tramadol 50 mg two daily.     Recommend that we  need to get an MRI of his C-spine.  Advised to stay on c-collar for now.  Follow-up after the MRI  Pain control with oxycodone and tramadol.  Minimize any movement/position which initiates pain.    The longitudinal plan of care for the diagnosis(es)/condition(s) as documented were addressed during this visit. Due to the added complexity in care, I will continue to support JANIS in the subsequent management and with ongoing continuity of care.     ECOG Performance    1 - Can't do physically strenuous work, but fully ambyulatory and can do light sedentary work    Distress Screening (within last 30 days)    No data recorded     Pain  Pain Score: Moderate Pain (4)  Pain Loc: Arm (right)    Problem List    Patient Active Problem List   Diagnosis     Chest pain     S/P CABG x 3     Coronary artery disease involving native coronary artery of native heart with angina pectoris     Mixed hyperlipidemia     Type 2 diabetes mellitus without complication, without long-term current use of insulin (H)     Diaphoresis     Chest pain, unspecified type     Pathological fracture of cervical vertebra, initial encounter     Prostate cancer metastatic to bone (H)        ______________________________________________________________________________    Oncology History    Diagnosis: Prostate cancer.  Patient presented to the ER in January 2025 with acute back pain.  Imaging showed sclerotic lesions in bones.  Patient had a PSA done at that time that was 544.  -1/16/25: Patient had a CT scan in the ER that showed lymphadenopathy in the chest abdomen and pelvis likely metastatic.  Sclerotic osseous mets.  -1/28/2025: Patient had a PSMA PET scan that showed findings suspicious for primary prostate malignancy with metastases involving several lymph node sites above and below the diaphragm, both lungs and numerous scattered sites in the skeleton.  -1/29/2025: Patient had a biopsy of a left pelvic lymph node that was consistent with metastatic  adenocarcinoma of the prostate gland.     Treatment:  -1/31/2025: Patient started Lupron.  Patient also started Casodex (daily for 14 days).  Zytiga 1000 mg daily and prednisone 5 mg twice a day.     ~Supportive cares.  Patient started Zometa.  He is getting 4 mg once a month    Interval History    Roberto is here for follow up.     He reported new pain from his shoulder down to his elbow 6/25/25. US ruled out DVT. Pian is persistent. Oxycodone helps some. Pain can be 8-9/10.       Pertinent items are noted in HPI.    Past History    Past Medical History:   Diagnosis Date     CAD (coronary artery disease)      Hyperlipidemia        Physical Exam        7/8/2025     1:02 PM   Vital Signs   Systolic 135   Diastolic 63   Pulse 79   Temperature 98.1  F (36.7  C)   Respirations 16   Weight (LB) 171 lb 8 oz   Pain Score 4 (Moderate)   O2 95 %       Review of system      Details noted in the history of present illness.  A detailed review of systems is otherwise negative.      Physical exam        /63 (BP Location: Left arm, Patient Position: Sitting, Cuff Size: Adult Regular)   Pulse 79   Temp 98.1  F (36.7  C) (Oral)   Resp 16   Wt 77.8 kg (171 lb 8 oz)   SpO2 95%   BMI 27.47 kg/m      GENERAL: No acute distress. Cooperative in conversation.   HEENT:  Pupils are equal, round and reactive. Oral mucosa is clean and intact. No ulcerations or mucositis noted. No bleeding noted.  RESP:Chest symmetric lungs are clear bilaterally per auscultation. Regular respiratory rate. No wheezes or rhonchi.  CV: Normal S1 S2 Regular, rate and rhythm.     ABD: Nondistended, soft, nontender. Positive bowel sounds. No organomegaly.   EXTREMITIES: No lower extremity edema.   NEURO: Non- focal. Alert and oriented x3.  Cranial nerves appear intact.  PSYCH: Within normal limits. No depression or anxiety.  SKIN: Warm dry intact.      Lab results Reviewed      No results found for this or any previous visit (from the past week).    Imaging  "results Reviewed        US Upper Extremity Venous Duplex Right  Result Date: 6/26/2025  EXAM: US UPPER EXTREMITY VENOUS DUPLEX RIGHT LOCATION: [facility] DATE: 6/26/2025 INDICATION: Right upper extremity edema and pain. Known metastatic cancer. Evaluate for DVT. COMPARISON: None. TECHNIQUE: Venous Duplex ultrasound of the right upper extremity with (when possible) and without compression, augmentation, and duplex. Color flow and spectral Doppler with waveform analysis performed. FINDINGS: Ultrasound includes evaluation of the internal jugular vein, innominate vein, subclavian vein, axillary vein, and brachial vein. The superficial cephalic and basilic veins were also evaluated where seen. RIGHT: No deep venous thrombosis. No superficial thrombophlebitis.     IMPRESSION: 1.  No deep venous thrombosis in the right upper extremity.        Signed by: Timothy Sterling MD      This note has been dictated using voice recognition software. Any grammatical or context distortions are unintentional and inherent to the software     Oncology Rooming Note    July 8, 2025 1:05 PM   Kamille Gleason is a 79 year old male who presents for:    Chief Complaint   Patient presents with     Oncology Clinic Visit     Return visit with new symptoms. Prostate cancer metastatic to bone.     Initial Vitals: /63 (BP Location: Left arm, Patient Position: Sitting, Cuff Size: Adult Regular)   Pulse 79   Temp 98.1  F (36.7  C) (Oral)   Resp 16   Wt 77.8 kg (171 lb 8 oz)   SpO2 95%   BMI 27.47 kg/m   Estimated body mass index is 27.47 kg/m  as calculated from the following:    Height as of 5/22/25: 1.683 m (5' 6.25\").    Weight as of this encounter: 77.8 kg (171 lb 8 oz). Body surface area is 1.91 meters squared.  Moderate Pain (4) Comment: Data Unavailable   No LMP for male patient.  Allergies reviewed: Yes  Medications reviewed: Yes    Medications: Medication refills not needed today.  Pharmacy name entered into " EPIC: [id number] [address] [facility]    Frailty Screening:   Is the patient here for a new oncology consult visit in cancer care? 2. No    PHQ9:  Did this patient require a PHQ9?: No      Clinical concerns: pain starting in neck going down right arm x 1 month 4/10. Swelling in last 3 fingers.  Jaad SEVERINO was notified.      Luda Duong CMA                Again, thank you for allowing me to participate in the care of your patient.        Sincerely,        Jada Nguyen PA-C    Electronically signed

## 2025-07-14 ENCOUNTER — TELEPHONE (OUTPATIENT)
Dept: ONCOLOGY | Facility: HOSPITAL | Age: 80
End: 2025-07-14
Payer: COMMERCIAL

## 2025-07-14 DIAGNOSIS — C79.51 PROSTATE CANCER METASTATIC TO BONE (H): Primary | ICD-10-CM

## 2025-07-14 DIAGNOSIS — R93.89 ABNORMAL MRI: ICD-10-CM

## 2025-07-14 DIAGNOSIS — M19.90 ARTHRITIS: ICD-10-CM

## 2025-07-14 DIAGNOSIS — C61 PROSTATE CANCER METASTATIC TO BONE (H): Primary | ICD-10-CM

## 2025-07-14 NOTE — TELEPHONE ENCOUNTER
[facility]: Cancer Care                                                                                          Call placed to patient's after Dr Sterling reviewed the cervical spine MRI report.  He states that as far as the cancer goes, it looks like it is under control.  It does not appear that the cancer is what is causing his pain.  He states that it appears that the pain is due to arthritis.  Referral has been placed for the spine clinic.  Call placed to patient to give him this information.  He verbalized understanding and I let him know I would send him the phone number to contact the spine clinic if he does not hear from them.    Signature:  Nasrin Nuñez RN

## 2025-07-14 NOTE — TELEPHONE ENCOUNTER
I received a phone call today from [facility].  He is calling to get the results of the MRI that he had on Thursday 7/10.  He is requesting a call back at [phone].    Christiane Fernandez RN on 7/14/2025 at 9:14 AM

## 2025-07-15 ENCOUNTER — PATIENT OUTREACH (OUTPATIENT)
Dept: CARE COORDINATION | Facility: CLINIC | Age: 80
End: 2025-07-15
Payer: COMMERCIAL

## 2025-07-15 ENCOUNTER — TELEPHONE (OUTPATIENT)
Dept: ORTHOPEDICS | Facility: CLINIC | Age: 80
End: 2025-07-15

## 2025-07-16 ENCOUNTER — TELEPHONE (OUTPATIENT)
Dept: ONCOLOGY | Facility: HOSPITAL | Age: 80
End: 2025-07-16
Payer: COMMERCIAL

## 2025-07-16 DIAGNOSIS — C61 PROSTATE CANCER METASTATIC TO BONE (H): Primary | ICD-10-CM

## 2025-07-16 DIAGNOSIS — C79.51 PROSTATE CANCER METASTATIC TO BONE (H): Primary | ICD-10-CM

## 2025-07-16 RX ORDER — PREDNISONE 5 MG/1
5 TABLET ORAL 2 TIMES DAILY
Qty: 60 TABLET | Refills: 1 | Status: SHIPPED | OUTPATIENT
Start: 2025-07-17

## 2025-07-16 RX ORDER — ABIRATERONE ACETATE 250 MG/1
1000 TABLET ORAL DAILY
Qty: 120 TABLET | Refills: 1 | Status: SHIPPED | OUTPATIENT
Start: 2025-07-17

## 2025-07-16 NOTE — ORAL ONC MGMT
Oral Chemotherapy Monitoring Program    Subjective/Objective:  Kamille Gleason is a 79 year old male contacted by phone for a follow-up visit for oral chemotherapy.  Roberto says he is doing well and denies any side effects. He takes his blood pressure about once a week and the last reading was about 135/78 mmHg. Roberto did ask how long he should anticipate being on the abiraterone?        3/28/2025     8:00 AM 4/25/2025    11:00 AM 4/25/2025     2:00 PM 5/22/2025     1:00 PM 6/4/2025     1:00 PM 6/19/2025     9:00 AM 7/16/2025    12:00 PM   ORAL CHEMOTHERAPY   Assessment Type Refill Left Voicemail;Refill Monthly Follow up Refill Left Voicemail Left Voicemail;Refill Quarterly Follow up   Diagnosis Code Prostate Cancer Prostate Cancer Prostate Cancer Prostate Cancer Prostate Cancer Prostate Cancer Prostate Cancer   Providers Dr. Asher Sterling   Clinic Name/Location [facility]   Drug Name Zytiga (abiraterone)  Zytiga (abiraterone)  Zytiga (abiraterone)  Zytiga (abiraterone)  Zytiga (abiraterone)  Zytiga (abiraterone) Zytiga (abiraterone)   Dose 1,000 mg  1,000 mg  1,000 mg  1,000 mg  1,000 mg  1,000 mg 1,000 mg   Current Schedule Daily  Daily  Daily  Daily  Daily  Daily Daily   Cycle Details Continuous Continuous Continuous Continuous Continuous Continuous Continuous   Start Date of Last Cycle       2/7/2025   Doses missed in last 2 weeks   0    0   Adherence Assessment   Adherent    Adherent   Adverse Effects   No AE identified during assessment    No AE identified during assessment   Home BPs       all BPs<140/90   Any new drug interactions?   No    No   Is the dose as ordered appropriate for the patient?   Yes    Yes       Data saved with a previous flowsheet row definition       Last PHQ-2 Score on record:       3/26/2025    10:18 AM   PHQ-2 ( 1999 [employer])   Q1: Little interest or pleasure  "in doing things 0   Q2: Feeling down, depressed or hopeless 0   PHQ-2 Score 0       Vitals:  BP:   BP Readings from Last 1 Encounters:   07/08/25 135/63     Wt Readings from Last 1 Encounters:   07/08/25 77.8 kg (171 lb 8 oz)     Estimated body surface area is 1.91 meters squared as calculated from the following:    Height as of 5/22/25: 1.683 m (5' 6.25\").    Weight as of 7/8/25: 77.8 kg (171 lb 8 oz).    Labs:  No results found for NA within last 30 days.     No results found for K within last 30 days.     No results found for CA within last 30 days.     No results found for Mag within last 30 days.     No results found for Phos within last 30 days.     No results found for ALBUMIN within last 30 days.     No results found for BUN within last 30 days.     No results found for CR within last 30 days.     No results found for AST within last 30 days.     No results found for ALT within last 30 days.     No results found for BILITOTAL within last 30 days.     No results found for WBC within last 30 days.     No results found for HGB within last 30 days.     No results found for PLT within last 30 days.     No results found for ANC within last 30 days.     No results found for ANC within last 30 days.          Assessment/Plan:  Roberto is tolerating the abiraterone therapy well. Discussed with Roberto how since his cancer has spread beyond the prostate we not longer consider it curable. Typically we monitor how well the cancer is controlled by the abiraterone with occasional scans, but especially watching the PSA level and it's good to see how his PSA has dropped considerable and is <1. As long as he is tolerating the abiraterone therapy well and it's keeping the cancer in check we'd anticipate keeping him on it. He appreciated the response and ability to discuss.   Continue as prescribed.     Follow-Up:  Will plan to review next appt with Dr. Sterling on 8/14 and then next assessment call in Oct 2025. Roberto agrees to the plan " "and knows they can call us in the meantime if they have any questions or concerns.    Refill Due:  Due for refill tomorrow, messaged care team to sign next prescription.    Chris Landrum, PharmD, [facility]  Oral Chemotherapy Pharmacist  [facility] Phone: [phone]  In Basket Pools: \"[facility] ORAL CHEMOTHERAPY PHARMACIST\" or \"[facility] ORAL CHEMOTHERAPY PHARMACIST\"  July 16, 2025      "

## 2025-07-17 ENCOUNTER — PATIENT OUTREACH (OUTPATIENT)
Dept: CARE COORDINATION | Facility: CLINIC | Age: 80
End: 2025-07-17
Payer: COMMERCIAL

## 2025-07-17 ENCOUNTER — TELEPHONE (OUTPATIENT)
Dept: NEUROSURGERY | Facility: CLINIC | Age: 80
End: 2025-07-17
Payer: COMMERCIAL

## 2025-07-17 NOTE — TELEPHONE ENCOUNTER
Attempted reaching out to [facility], was unable to leave message.  Emmanuelle Allen, RN, CNRN

## 2025-07-17 NOTE — TELEPHONE ENCOUNTER
Other: Kamille is scheduled to be seen July 22.    Referred by Timothy Sterling MD @ [facility].   Patient needing to be seen for Neck pain and issues into arm/hand.   Patient also dealing with metastatic cancer issues.   Please review Telephone encounter from July 15 and chart info.     Patient did not want to go to [facility]. Wanted to be seen at [place].     If this cannot be done, please let him know.     Could we send this information to you in [facility] or would you prefer to receive a phone call?:   Patient would prefer a phone call   Okay to leave a detailed message?: Yes at Home number on file [phone] (home)

## 2025-07-22 ENCOUNTER — OFFICE VISIT (OUTPATIENT)
Dept: NEUROSURGERY | Facility: CLINIC | Age: 80
End: 2025-07-22
Attending: INTERNAL MEDICINE
Payer: COMMERCIAL

## 2025-07-22 VITALS
SYSTOLIC BLOOD PRESSURE: 134 MMHG | HEART RATE: 74 BPM | WEIGHT: 174 LBS | BODY MASS INDEX: 27.97 KG/M2 | OXYGEN SATURATION: 99 % | HEIGHT: 66 IN | DIASTOLIC BLOOD PRESSURE: 63 MMHG

## 2025-07-22 DIAGNOSIS — M19.90 ARTHRITIS: ICD-10-CM

## 2025-07-22 DIAGNOSIS — C79.51 PROSTATE CANCER METASTATIC TO BONE (H): ICD-10-CM

## 2025-07-22 DIAGNOSIS — C61 PROSTATE CANCER METASTATIC TO BONE (H): ICD-10-CM

## 2025-07-22 DIAGNOSIS — R93.89 ABNORMAL MRI: ICD-10-CM

## 2025-07-22 PROCEDURE — 3078F DIAST BP <80 MM HG: CPT | Performed by: SURGERY

## 2025-07-22 PROCEDURE — 1125F AMNT PAIN NOTED PAIN PRSNT: CPT | Performed by: SURGERY

## 2025-07-22 PROCEDURE — 3075F SYST BP GE 130 - 139MM HG: CPT | Performed by: SURGERY

## 2025-07-22 PROCEDURE — 99203 OFFICE O/P NEW LOW 30 MIN: CPT | Performed by: SURGERY

## 2025-07-22 ASSESSMENT — PAIN SCALES - GENERAL: PAINLEVEL_OUTOF10: MILD PAIN (3)

## 2025-07-22 NOTE — NURSING NOTE
"Kamille Gleason is a 79 year old male who presents for:  Chief Complaint   Patient presents with    Consult     Non-displaced fracture of C7 vertebra. Patient reports pain in neck about a level 3 today, with radiating pain down the right arm. Numbness/tingling reported in right hand, weakness in arm also reported.        Initial Vitals:  /63   Pulse 74   Ht 5' 6.25\" (1.683 m)   Wt 174 lb (78.9 kg)   SpO2 99%   BMI 27.87 kg/m   Estimated body mass index is 27.87 kg/m  as calculated from the following:    Height as of this encounter: 5' 6.25\" (1.683 m).    Weight as of this encounter: 174 lb (78.9 kg). Body surface area is 1.92 meters squared. BP completed using cuff size: regular  Mild Pain (3)        Ryan Macias    "

## 2025-07-22 NOTE — PROGRESS NOTES
The patient is a 79-year-old male.  He is here with his wife.  We also talked with his daughter on speaker phone.  The patient has a history of prostate cancer.  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  He is being treated by Dr. Sterling.  In January 2025 he was diagnosed with a C7 fracture.  On his most recent MRI the fracture appears to be healing.  The patient himself feels progressively better.  He has some right neck pain and right arm pain.  He has some right arm and hand weakness.  He also has numbness and tingling in his right 4th and 5th fingers.  Since he changed to a softer pillow his symptoms are getting progressively better.  He does not want to try physical therapy or traction or injections now.  He wants to wait to see how things go with the new pillow.  If he has persistent symptoms or worsening symptoms he is going to come back to the spine center for a nonsurgical consult and trial of conservative treatment possibly including physical therapy and/or traction and/or injections.  We could also consider an EMG of the right arm to distinguish between C8 nerve root versus ulnar nerve.  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  This would potentially cause right C8 radiculopathy.  On exam he has some right first dorsal interosseous atrophy and some finger abduction weakness and some numbness and tingling in the right 4th and 5th digits.  For now the patient and his wife are satisfied with a nonsurgical plan.  The patient and wife were instructed on activity and limitations and restrictions and complications to watch for and he is going to return as needed.  The patient and his wife are satisfied with the plan.  Total time 30 minutes, more than 50% spent counseling and/or coordinating care.

## 2025-07-22 NOTE — LETTER
7/22/2025      Kamille Gleason  [address] [facility] [address]      Dear Colleague,    Thank you for referring your patient, Kamille Gleason, to the [facility]. Please see a copy of my visit note below.    The patient is a 79-year-old male.  He is here with his wife.  We also talked with his daughter on speaker phone.  The patient has a history of prostate cancer.  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  He is being treated by Dr. Sterling.  In January 2025 he was diagnosed with a C7 fracture.  On his most recent MRI the fracture appears to be healing.  The patient himself feels progressively better.  He has some right neck pain and right arm pain.  He has some right arm and hand weakness.  He also has numbness and tingling in his right 4th and 5th fingers.  Since he changed to a softer pillow his symptoms are getting progressively better.  He does not want to try physical therapy or traction or injections now.  He wants to wait to see how things go with the new pillow.  If he has persistent symptoms or worsening symptoms he is going to come back to the spine center for a nonsurgical consult and trial of conservative treatment possibly including physical therapy and/or traction and/or injections.  We could also consider an EMG of the right arm to distinguish between C8 nerve root versus ulnar nerve.  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  This would potentially cause right C8 radiculopathy.  On exam he has some right first dorsal interosseous atrophy and some finger abduction weakness and some numbness and tingling in the right 4th and 5th digits.  For now the patient and his wife are satisfied with a nonsurgical plan.  The patient and wife were instructed on activity and limitations and restrictions and complications to watch for and he is going to return as needed.  The patient and his wife are satisfied with the plan.  Total time 30 minutes, more than 50%  spent counseling and/or coordinating care.    Again, thank you for allowing me to participate in the care of your patient.        Sincerely,        Shimon Arzate MD    Electronically signed

## 2025-07-30 DIAGNOSIS — M84.48XA PATHOLOGICAL FRACTURE OF CERVICAL VERTEBRA, INITIAL ENCOUNTER: ICD-10-CM

## 2025-07-30 NOTE — TELEPHONE ENCOUNTER
Roberto called today requesting a refill of oxycodone. He is taking 2 per day. It was last refilled 6/25/25, #60, 0 refills. Preferred pharmacy: Jayden Fernandez RN on 7/30/2025 at 12:15 PM

## 2025-07-31 RX ORDER — OXYCODONE HYDROCHLORIDE 5 MG/1
5-10 TABLET ORAL EVERY 4 HOURS PRN
Qty: 60 TABLET | Refills: 0 | Status: SHIPPED | OUTPATIENT
Start: 2025-07-31

## 2025-07-31 NOTE — TELEPHONE ENCOUNTER
Roberto called again today to check on the status of this refill request. He is going out of town for the week and would like to bring this with him. Message sent to Jada Nguyen PA-C with this information.     Christiane Fernandez RN on 7/31/2025 at 9:12 AM

## 2025-08-14 ENCOUNTER — INFUSION THERAPY VISIT (OUTPATIENT)
Dept: INFUSION THERAPY | Facility: HOSPITAL | Age: 80
End: 2025-08-14
Payer: COMMERCIAL

## 2025-08-14 ENCOUNTER — ONCOLOGY VISIT (OUTPATIENT)
Dept: ONCOLOGY | Facility: HOSPITAL | Age: 80
End: 2025-08-14
Payer: COMMERCIAL

## 2025-08-14 ENCOUNTER — LAB (OUTPATIENT)
Dept: INFUSION THERAPY | Facility: HOSPITAL | Age: 80
End: 2025-08-14
Payer: COMMERCIAL

## 2025-08-14 VITALS
TEMPERATURE: 97.3 F | WEIGHT: 174 LBS | HEART RATE: 65 BPM | HEIGHT: 66 IN | RESPIRATION RATE: 19 BRPM | SYSTOLIC BLOOD PRESSURE: 124 MMHG | BODY MASS INDEX: 27.97 KG/M2 | OXYGEN SATURATION: 95 % | DIASTOLIC BLOOD PRESSURE: 73 MMHG

## 2025-08-14 DIAGNOSIS — C61 PROSTATE CANCER METASTATIC TO BONE (H): Primary | ICD-10-CM

## 2025-08-14 DIAGNOSIS — C79.51 PROSTATE CANCER METASTATIC TO BONE (H): Primary | ICD-10-CM

## 2025-08-14 DIAGNOSIS — M84.48XA PATHOLOGICAL FRACTURE OF CERVICAL VERTEBRA, INITIAL ENCOUNTER: ICD-10-CM

## 2025-08-14 DIAGNOSIS — S12.601D CLOSED NONDISPLACED FRACTURE OF SEVENTH CERVICAL VERTEBRA WITH ROUTINE HEALING, UNSPECIFIED FRACTURE MORPHOLOGY, SUBSEQUENT ENCOUNTER: ICD-10-CM

## 2025-08-14 LAB
ALBUMIN SERPL BCG-MCNC: 4 G/DL (ref 3.5–5.2)
ALP SERPL-CCNC: 69 U/L (ref 40–150)
ALT SERPL W P-5'-P-CCNC: 18 U/L (ref 0–70)
ANION GAP SERPL CALCULATED.3IONS-SCNC: 10 MMOL/L (ref 7–15)
AST SERPL W P-5'-P-CCNC: 21 U/L (ref 0–45)
BASOPHILS # BLD AUTO: 0.04 10E3/UL (ref 0–0.2)
BASOPHILS NFR BLD AUTO: 0.4 %
BILIRUB SERPL-MCNC: 0.4 MG/DL
BUN SERPL-MCNC: 24.4 MG/DL (ref 8–23)
CALCIUM SERPL-MCNC: 9 MG/DL (ref 8.8–10.4)
CHLORIDE SERPL-SCNC: 106 MMOL/L (ref 98–107)
CREAT SERPL-MCNC: 0.94 MG/DL (ref 0.67–1.17)
EGFRCR SERPLBLD CKD-EPI 2021: 83 ML/MIN/1.73M2
EOSINOPHIL # BLD AUTO: 0.13 10E3/UL (ref 0–0.7)
EOSINOPHIL NFR BLD AUTO: 1.4 %
ERYTHROCYTE [DISTWIDTH] IN BLOOD BY AUTOMATED COUNT: 13 % (ref 10–15)
GLUCOSE SERPL-MCNC: 171 MG/DL (ref 70–99)
HCO3 SERPL-SCNC: 23 MMOL/L (ref 22–29)
HCT VFR BLD AUTO: 37.5 % (ref 40–53)
HGB BLD-MCNC: 12.5 G/DL (ref 13.3–17.7)
IMM GRANULOCYTES # BLD: 0.03 10E3/UL
IMM GRANULOCYTES NFR BLD: 0.3 %
LYMPHOCYTES # BLD AUTO: 1.47 10E3/UL (ref 0.8–5.3)
LYMPHOCYTES NFR BLD AUTO: 15.6 %
MCH RBC QN AUTO: 30.9 PG (ref 26.5–33)
MCHC RBC AUTO-ENTMCNC: 33.3 G/DL (ref 31.5–36.5)
MCV RBC AUTO: 92.6 FL (ref 78–100)
MONOCYTES # BLD AUTO: 0.57 10E3/UL (ref 0–1.3)
MONOCYTES NFR BLD AUTO: 6 %
NEUTROPHILS # BLD AUTO: 7.21 10E3/UL (ref 1.6–8.3)
NEUTROPHILS NFR BLD AUTO: 76.3 %
NRBC # BLD AUTO: <0.03 10E3/UL
NRBC BLD AUTO-RTO: 0 /100
PLATELET # BLD AUTO: 179 10E3/UL (ref 150–450)
POTASSIUM SERPL-SCNC: 4 MMOL/L (ref 3.4–5.3)
PROT SERPL-MCNC: 6.6 G/DL (ref 6.4–8.3)
PSA SERPL DL<=0.01 NG/ML-MCNC: 0.32 NG/ML (ref 0–6.5)
RBC # BLD AUTO: 4.05 10E6/UL (ref 4.4–5.9)
SODIUM SERPL-SCNC: 139 MMOL/L (ref 135–145)
WBC # BLD AUTO: 9.45 10E3/UL (ref 4–11)

## 2025-08-14 PROCEDURE — 82247 BILIRUBIN TOTAL: CPT

## 2025-08-14 PROCEDURE — G0463 HOSPITAL OUTPT CLINIC VISIT: HCPCS | Performed by: INTERNAL MEDICINE

## 2025-08-14 PROCEDURE — 250N000011 HC RX IP 250 OP 636: Performed by: INTERNAL MEDICINE

## 2025-08-14 PROCEDURE — 258N000003 HC RX IP 258 OP 636: Performed by: INTERNAL MEDICINE

## 2025-08-14 PROCEDURE — 84153 ASSAY OF PSA TOTAL: CPT

## 2025-08-14 PROCEDURE — 85025 COMPLETE CBC W/AUTO DIFF WBC: CPT

## 2025-08-14 PROCEDURE — 36415 COLL VENOUS BLD VENIPUNCTURE: CPT

## 2025-08-14 PROCEDURE — 999N000248 HC STATISTIC IV INSERT WITH US BY RN

## 2025-08-14 RX ORDER — ZOLEDRONIC ACID 0.04 MG/ML
4 INJECTION, SOLUTION INTRAVENOUS ONCE
Status: COMPLETED | OUTPATIENT
Start: 2025-08-14 | End: 2025-08-14

## 2025-08-14 RX ADMIN — ZOLEDRONIC ACID 4 MG: 0.04 INJECTION, SOLUTION INTRAVENOUS at 14:49

## 2025-08-14 RX ADMIN — SODIUM CHLORIDE 250 ML: 0.9 INJECTION, SOLUTION INTRAVENOUS at 14:49

## 2025-08-14 ASSESSMENT — PAIN SCALES - GENERAL: PAINLEVEL_OUTOF10: MILD PAIN (2)

## 2025-08-18 DIAGNOSIS — C61 PROSTATE CANCER METASTATIC TO BONE (H): Primary | ICD-10-CM

## 2025-08-18 DIAGNOSIS — C79.51 PROSTATE CANCER METASTATIC TO BONE (H): Primary | ICD-10-CM

## 2025-08-18 DIAGNOSIS — M84.48XA PATHOLOGICAL FRACTURE OF CERVICAL VERTEBRA, INITIAL ENCOUNTER: ICD-10-CM

## 2025-08-18 RX ORDER — HEPARIN SODIUM (PORCINE) LOCK FLUSH IV SOLN 100 UNIT/ML 100 UNIT/ML
5 SOLUTION INTRAVENOUS
OUTPATIENT
Start: 2025-11-07

## 2025-08-18 RX ORDER — HEPARIN SODIUM (PORCINE) LOCK FLUSH IV SOLN 100 UNIT/ML 100 UNIT/ML
5 SOLUTION INTRAVENOUS
OUTPATIENT
Start: 2025-10-10

## 2025-08-18 RX ORDER — HEPARIN SODIUM (PORCINE) LOCK FLUSH IV SOLN 100 UNIT/ML 100 UNIT/ML
5 SOLUTION INTRAVENOUS
OUTPATIENT
Start: 2025-09-12

## 2025-08-18 RX ORDER — HEPARIN SODIUM,PORCINE 10 UNIT/ML
5-20 VIAL (ML) INTRAVENOUS DAILY PRN
OUTPATIENT
Start: 2025-09-12

## 2025-08-18 RX ORDER — HEPARIN SODIUM,PORCINE 10 UNIT/ML
5-20 VIAL (ML) INTRAVENOUS DAILY PRN
OUTPATIENT
Start: 2025-12-05

## 2025-08-18 RX ORDER — HEPARIN SODIUM,PORCINE 10 UNIT/ML
5-20 VIAL (ML) INTRAVENOUS DAILY PRN
OUTPATIENT
Start: 2025-11-07

## 2025-08-18 RX ORDER — HEPARIN SODIUM,PORCINE 10 UNIT/ML
5-20 VIAL (ML) INTRAVENOUS DAILY PRN
OUTPATIENT
Start: 2025-08-18

## 2025-08-18 RX ORDER — HEPARIN SODIUM (PORCINE) LOCK FLUSH IV SOLN 100 UNIT/ML 100 UNIT/ML
5 SOLUTION INTRAVENOUS
OUTPATIENT
Start: 2025-12-05

## 2025-08-18 RX ORDER — HEPARIN SODIUM (PORCINE) LOCK FLUSH IV SOLN 100 UNIT/ML 100 UNIT/ML
5 SOLUTION INTRAVENOUS
OUTPATIENT
Start: 2025-08-18

## 2025-08-18 RX ORDER — HEPARIN SODIUM,PORCINE 10 UNIT/ML
5-20 VIAL (ML) INTRAVENOUS DAILY PRN
OUTPATIENT
Start: 2025-10-10

## 2025-08-21 ENCOUNTER — TELEPHONE (OUTPATIENT)
Dept: ONCOLOGY | Facility: HOSPITAL | Age: 80
End: 2025-08-21
Payer: COMMERCIAL

## 2025-09-02 DIAGNOSIS — M84.48XA PATHOLOGICAL FRACTURE OF CERVICAL VERTEBRA, INITIAL ENCOUNTER: ICD-10-CM

## 2025-09-02 RX ORDER — OXYCODONE HYDROCHLORIDE 5 MG/1
5-10 TABLET ORAL EVERY 4 HOURS PRN
Qty: 60 TABLET | Refills: 0 | Status: SHIPPED | OUTPATIENT
Start: 2025-09-02

## (undated) RX ORDER — FENTANYL CITRATE 50 UG/ML
INJECTION, SOLUTION INTRAMUSCULAR; INTRAVENOUS
Status: DISPENSED
Start: 2025-01-29